# Patient Record
Sex: FEMALE | Race: WHITE | Employment: FULL TIME | URBAN - METROPOLITAN AREA
[De-identification: names, ages, dates, MRNs, and addresses within clinical notes are randomized per-mention and may not be internally consistent; named-entity substitution may affect disease eponyms.]

---

## 2017-01-03 ENCOUNTER — TELEPHONE (OUTPATIENT)
Dept: SURGERY | Facility: CLINIC | Age: 52
End: 2017-01-03

## 2017-01-03 NOTE — TELEPHONE ENCOUNTER
Pt states that she has hives on her elbows. Pt was wondering if lumbar steroid injections could cause this. Pt was instructed to call her PCP. No further questions.

## 2017-01-18 ENCOUNTER — OFFICE VISIT (OUTPATIENT)
Dept: SURGERY | Facility: CLINIC | Age: 52
End: 2017-01-18

## 2017-01-18 VITALS
BODY MASS INDEX: 33.74 KG/M2 | WEIGHT: 215 LBS | SYSTOLIC BLOOD PRESSURE: 128 MMHG | DIASTOLIC BLOOD PRESSURE: 74 MMHG | RESPIRATION RATE: 15 BRPM | HEART RATE: 75 BPM | HEIGHT: 67 IN

## 2017-01-18 DIAGNOSIS — M54.16 LUMBAR RADICULOPATHY: ICD-10-CM

## 2017-01-18 DIAGNOSIS — M46.1 SACROILIITIS (HCC): ICD-10-CM

## 2017-01-18 DIAGNOSIS — M47.816 LUMBAR FACET ARTHROPATHY: Primary | ICD-10-CM

## 2017-01-18 PROCEDURE — 99214 OFFICE O/P EST MOD 30 MIN: CPT | Performed by: ANESTHESIOLOGY

## 2017-01-18 RX ORDER — BETAMETHASONE DIPROPIONATE 0.5 MG/G
CREAM TOPICAL
Refills: 0 | COMMUNITY
Start: 2017-01-11 | End: 2017-02-28

## 2017-01-18 NOTE — PATIENT INSTRUCTIONS
Refill policies:    • Allow 2 business days for refills; controlled substances may take longer.   • Contact your pharmacy at least 5 days prior to running out of medication and have them send an electronic request or submit request through the “request re your physician has recommended that you have a procedure or additional testing performed. [facility]) will contact your insurance carrier to obtain pre-certification or prior authorization.     Unfortunately, RIOS has seen an increas

## 2017-01-22 NOTE — PROGRESS NOTES
Name: Pineda Navarrete   : 1965   DOS: 2017     Pain Clinic Follow Up Visit:   Pineda Navarrete is a 54year old female who presents for recheck of her chronic low back pain.   She is status post lumbar epidural, diagnostic lumbar facet and sacro physician and was recommended to discontinue.     ASSESSMENT AND PLAN:   Lumbar facet arthropathy  (primary encounter diagnosis)  Sacroiliitis (hcc)  Lumbar radiculopathy   Drug reaction  As her pain is completely gone I recommended her to be followed in th

## 2017-02-16 ENCOUNTER — HOSPITAL ENCOUNTER (OUTPATIENT)
Dept: MAMMOGRAPHY | Age: 52
Discharge: HOME | End: 2017-02-16
Attending: FAMILY MEDICINE
Payer: COMMERCIAL

## 2017-02-16 DIAGNOSIS — Z12.31 VISIT FOR SCREENING MAMMOGRAM: ICD-10-CM

## 2017-02-16 PROCEDURE — 77067 SCR MAMMO BI INCL CAD: CPT

## 2017-03-07 ENCOUNTER — TELEPHONE (OUTPATIENT)
Dept: SURGERY | Facility: CLINIC | Age: 52
End: 2017-03-07

## 2017-03-07 NOTE — TELEPHONE ENCOUNTER
Pt having difficulty scheduling physical therapy. Is being told no referral on her account. Told her I would look into it and call her back.

## 2017-03-07 NOTE — TELEPHONE ENCOUNTER
Referral corrected be internal physical therapy. Called [facility] and they authorized 8 visits. LM for pt to call Central Scheduling, [phone] and choose option 1, option 1.   Explained she needs to have therapy at an [place] [address] and to [facility] if she has any

## 2017-03-17 ENCOUNTER — TELEPHONE (OUTPATIENT)
Dept: SURGERY | Facility: CLINIC | Age: 52
End: 2017-03-17

## 2017-03-17 DIAGNOSIS — M47.816 LUMBAR FACET ARTHROPATHY: ICD-10-CM

## 2017-03-17 DIAGNOSIS — E34.52: Primary | ICD-10-CM

## 2017-03-17 DIAGNOSIS — M46.1 SACROILIITIS (HCC): ICD-10-CM

## 2017-03-17 DIAGNOSIS — M54.16 LUMBAR RADICULOPATHY: ICD-10-CM

## 2017-03-17 NOTE — TELEPHONE ENCOUNTER
Spoke with Jolie Ac from PT, new order is needed for pt to proceed with PT. Ok per MADYSON Kang for new order. New Order placed.

## 2017-04-04 ENCOUNTER — OFFICE VISIT (OUTPATIENT)
Dept: PHYSICAL THERAPY | Age: 52
End: 2017-04-04
Attending: NURSE PRACTITIONER
Payer: COMMERCIAL

## 2017-04-04 DIAGNOSIS — M47.816 LUMBAR FACET ARTHROPATHY: Primary | ICD-10-CM

## 2017-04-04 DIAGNOSIS — M54.16 LUMBAR RADICULOPATHY: ICD-10-CM

## 2017-04-04 DIAGNOSIS — M46.1 SACROILIITIS (HCC): ICD-10-CM

## 2017-04-04 PROCEDURE — 97112 NEUROMUSCULAR REEDUCATION: CPT

## 2017-04-04 PROCEDURE — 97162 PT EVAL MOD COMPLEX 30 MIN: CPT

## 2017-04-06 ENCOUNTER — OFFICE VISIT (OUTPATIENT)
Dept: PHYSICAL THERAPY | Age: 52
End: 2017-04-06
Attending: NURSE PRACTITIONER
Payer: COMMERCIAL

## 2017-04-06 PROCEDURE — 97112 NEUROMUSCULAR REEDUCATION: CPT

## 2017-04-06 NOTE — PROGRESS NOTES
Dx: Hyperlordotic Posture Syndrome        Authorized # of Visits:  8        Next MD visit: none scheduled  Fall Risk: standard         Precautions: L5/S1 fusion with partial S1 sacralization.              Subjective: Reports she is performing her HEP as ask min  Total Treatment Time: 45 min

## 2017-04-08 NOTE — PROGRESS NOTES
SPINE EVALUATION:   Referring Physician: Dr. Estephanie Son  Diagnosis: Lumbar hyperlordotic posture syndrome    Date of Service: 4/8/2017     PATIENT SUMMARY   Leslie Connolly is a 54year old y/o female who presents to therapy today with complaints of interm w/o guarding. No lumbar shift.scolosis. Hyperlordotic with anterior pelvic tilt.    Neuro Screen: negative     Lumbar AROM:   Flexion: minimal loss  Extension: minimal loss general LBP  Sidebending: R min loss; L min loss  Rotation: R WNL; L WNL    [employer] Patient will be seen for 1-2 x/week or a total of 10 visits over a 90 day period. Treatment will include: Manual Therapy; Therapeutic Exercises; Neuromuscular Re-education; Therapeutic Activity; Pt education; Home exercise program instruction.     Educatio

## 2017-04-11 ENCOUNTER — OFFICE VISIT (OUTPATIENT)
Dept: PHYSICAL THERAPY | Age: 52
End: 2017-04-11
Attending: NURSE PRACTITIONER
Payer: COMMERCIAL

## 2017-04-11 PROCEDURE — 97112 NEUROMUSCULAR REEDUCATION: CPT

## 2017-04-11 PROCEDURE — 97140 MANUAL THERAPY 1/> REGIONS: CPT

## 2017-04-11 NOTE — PROGRESS NOTES
Dx: Hyperlordotic Posture Syndrome        Authorized # of Visits:  8        Next MD visit: none scheduled  Fall Risk: standard         Precautions: L5/S1 fusion with partial S1 sacralization. Subjective: No hamstring cramping since last seen. or AROM. Causing compression lumbar segments, referring pain. Resolved with sustained flexion motion. Progressed HEP and tx to further progress gapping of segments, decreasing excessive lordosis and ant pelvic tilt.   Pt again educated on avoiding that pos

## 2017-04-13 ENCOUNTER — APPOINTMENT (OUTPATIENT)
Dept: PHYSICAL THERAPY | Age: 52
End: 2017-04-13
Attending: NURSE PRACTITIONER
Payer: COMMERCIAL

## 2017-04-18 ENCOUNTER — OFFICE VISIT (OUTPATIENT)
Dept: PHYSICAL THERAPY | Age: 52
End: 2017-04-18
Attending: NURSE PRACTITIONER
Payer: COMMERCIAL

## 2017-04-18 PROCEDURE — 97140 MANUAL THERAPY 1/> REGIONS: CPT

## 2017-04-18 PROCEDURE — 97110 THERAPEUTIC EXERCISES: CPT

## 2017-04-18 NOTE — PROGRESS NOTES
Dx: Hyperlordotic Posture Syndrome        Authorized # of Visits:  8        Next MD visit: none scheduled  Fall Risk: standard         Precautions: L5/S1 fusion with partial S1 sacralization. Subjective: Pt comes to therapy upset, in distress. negative       Slump and SLR tests negative NM re ed :Hook lying, leg off table hip flex stretch x 60 sec each        Prone hamstring curls, d/c L cramping.  Ab brace in hook lying with draw in maneuver, x 10 seconds x 10 reps        HEP:  Sit: L knee curls

## 2017-05-11 ENCOUNTER — OFFICE VISIT (OUTPATIENT)
Dept: PHYSICAL THERAPY | Age: 52
End: 2017-05-11
Attending: FAMILY MEDICINE
Payer: COMMERCIAL

## 2017-05-11 PROCEDURE — 97140 MANUAL THERAPY 1/> REGIONS: CPT

## 2017-05-11 PROCEDURE — 97112 NEUROMUSCULAR REEDUCATION: CPT

## 2017-05-11 PROCEDURE — 97110 THERAPEUTIC EXERCISES: CPT

## 2017-05-11 NOTE — PROGRESS NOTES
Dx: Hyperlordotic Posture Syndrome        Authorized # of Visits:  8        Next MD visit: none scheduled  Fall Risk: standard         Precautions: L5/S1 fusion with partial S1 sacralization.              Subjective:  Pt reports she has missed therapy due t 4  10 min      B bridge with ab brace x5 2 sets On R side:  Up/mid/low  To L5  lumbar distraction, gapping grade 4  10 min ULTT #1 and #2 negative    Dejuan fx test negative    VB test negative Prone:   Alternate hip ext x 10 each 2 sets      Slump and S -Independent with progressive HEP. Plan: Resume tx to decrease hyperlordosis and ant pelvic tilt. Charges:    There ex 1 nm re ed 2  man 1    Total Timed Treatment:  45 min  Total Treatment Time: 45 min    `

## 2017-05-16 ENCOUNTER — OFFICE VISIT (OUTPATIENT)
Dept: PHYSICAL THERAPY | Age: 52
End: 2017-05-16
Attending: FAMILY MEDICINE
Payer: COMMERCIAL

## 2017-05-16 PROCEDURE — 97112 NEUROMUSCULAR REEDUCATION: CPT

## 2017-05-16 PROCEDURE — 97110 THERAPEUTIC EXERCISES: CPT

## 2017-05-16 PROCEDURE — 97140 MANUAL THERAPY 1/> REGIONS: CPT

## 2017-05-16 NOTE — PROGRESS NOTES
Dx: Hyperlordotic Posture Syndrome        Authorized # of Visits:  8        Next MD visit: none scheduled  Fall Risk: standard         Precautions: L5/S1 fusion with partial S1 sacralization.              Subjective:  L anterior hip has decreased since last faults. On R side:  Up/mid/low  To L5  lumbar distraction, gapping grade 4  10 min NM re ed:   L LE neural mobes with SLR,DF and eversion  5 min     B bridge with ab brace x5 2 sets On R side:  Up/mid/low  To L5  lumbar distraction, gapping grade 4  10 min body mechanics/posture awareness so pt can self correct while performing house chores in 4 pt kneeling with minimal back pain, little difficulty.    -Improve limbo pelvic strength and flexibility  to WFL's so pt can perform bed, chair, car transfers with li

## 2017-05-23 ENCOUNTER — OFFICE VISIT (OUTPATIENT)
Dept: PHYSICAL THERAPY | Age: 52
End: 2017-05-23
Attending: FAMILY MEDICINE
Payer: COMMERCIAL

## 2017-05-23 PROCEDURE — 97110 THERAPEUTIC EXERCISES: CPT

## 2017-05-23 PROCEDURE — 97112 NEUROMUSCULAR REEDUCATION: CPT

## 2017-05-23 PROCEDURE — 97140 MANUAL THERAPY 1/> REGIONS: CPT

## 2017-05-23 NOTE — PROGRESS NOTES
Dx: Hyperlordotic Posture Syndrome        Authorized # of Visits:  8        Next MD visit: none scheduled  Fall Risk: standard         Precautions: L5/S1 fusion with partial S1 sacralization.            Physical Therapy Discharge Report  Subjective: Continu so pt can self correct while performing house chores in 4 pt kneeling with minimal back pain, little difficulty.    -Improve limbo pelvic strength and flexibility  to WFL's so pt can perform bed, chair, car transfers with little pain or difficulty.   -Indep lumbar   8 min    As above with alternate bent knee raise x 10 each  2 sets  (add HEP) Hook lying:  :  -Lumbar mobes A/P grade 2,3 with hip flex  PROM, L/R  10 min Supine:  -PROM rot and side bend, full WNL's    Cervical side glides, no faults.  On R side:

## 2017-05-25 ENCOUNTER — APPOINTMENT (OUTPATIENT)
Dept: PHYSICAL THERAPY | Age: 52
End: 2017-05-25
Attending: FAMILY MEDICINE
Payer: COMMERCIAL

## 2017-06-01 ENCOUNTER — APPOINTMENT (OUTPATIENT)
Dept: PHYSICAL THERAPY | Age: 52
End: 2017-06-01
Attending: FAMILY MEDICINE
Payer: COMMERCIAL

## 2017-06-01 ENCOUNTER — TELEPHONE (OUTPATIENT)
Dept: SURGERY | Facility: CLINIC | Age: 52
End: 2017-06-01

## 2017-06-01 RX ORDER — METHOCARBAMOL 500 MG/1
500 TABLET, FILM COATED ORAL 3 TIMES DAILY PRN
Qty: 90 TABLET | Refills: 2 | Status: SHIPPED | OUTPATIENT
Start: 2017-06-01 | End: 2017-08-14

## 2017-06-01 NOTE — TELEPHONE ENCOUNTER
Pt last saw Dr Yane Contreras 1/2017,pt has appt scheduled with Nino Cai and declined to schedule appt with Dr Yane Contreras at this time but is requesting muscle relaxer for pain-please call pt at [phone]

## 2017-06-01 NOTE — TELEPHONE ENCOUNTER
Pt called in tears requesting muscle relaxant. Pain from 11/2016 has returned and pt having additional symptoms of cramping and burning to toes and top of LT foot. Informed EDUAR Holden. Pt last seen in office 01/2017. Confirmed pharmacy.   Informed pt to

## 2017-06-02 ENCOUNTER — TELEPHONE (OUTPATIENT)
Dept: SURGERY | Facility: CLINIC | Age: 52
End: 2017-06-02

## 2017-06-02 ENCOUNTER — OFFICE VISIT (OUTPATIENT)
Dept: SURGERY | Facility: CLINIC | Age: 52
End: 2017-06-02

## 2017-06-02 VITALS — HEART RATE: 64 BPM | RESPIRATION RATE: 17 BRPM | SYSTOLIC BLOOD PRESSURE: 120 MMHG | DIASTOLIC BLOOD PRESSURE: 80 MMHG

## 2017-06-02 DIAGNOSIS — M54.12 CERVICAL RADICULOPATHY AT C7: ICD-10-CM

## 2017-06-02 DIAGNOSIS — M54.16 LUMBAR RADICULOPATHY, RIGHT: ICD-10-CM

## 2017-06-02 DIAGNOSIS — G89.29 CHRONIC BILATERAL LOW BACK PAIN WITH LEFT-SIDED SCIATICA: ICD-10-CM

## 2017-06-02 DIAGNOSIS — M54.16 LUMBAR RADICULOPATHY: ICD-10-CM

## 2017-06-02 DIAGNOSIS — M47.816 LUMBAR FACET ARTHROPATHY: Primary | ICD-10-CM

## 2017-06-02 DIAGNOSIS — M54.42 CHRONIC BILATERAL LOW BACK PAIN WITH LEFT-SIDED SCIATICA: ICD-10-CM

## 2017-06-02 PROCEDURE — 99213 OFFICE O/P EST LOW 20 MIN: CPT | Performed by: PHYSICIAN ASSISTANT

## 2017-06-02 RX ORDER — METHYLPREDNISOLONE 4 MG/1
TABLET ORAL
Qty: 1 PACKAGE | Refills: 0 | Status: SHIPPED | OUTPATIENT
Start: 2017-06-02 | End: 2017-06-09 | Stop reason: ALTCHOICE

## 2017-06-02 NOTE — PROGRESS NOTES
Neurosurgery Lumbar Consultation      HISTORY OF PRESENT Nick Dubois is a 54year old female here in follow-up for increasing pain.     Since her last visit she did have an MRI of the lumbar spine which showed surgical changes at L5-S1 with spon In 2014 she had a lumbar myelogram and post milligrams CT.  She saw Dr. Shanique Roy who recommended physical therapy.  She did not do PT.      Over the last 2 years her pain is gotten worse.  She states her back pains and 8-9/10 it radiates into her left but NEUROLOGICAL:  This patient is alert and orientated x 3.  Speech fluent. Comprehension intact. Face is symmetrical.    SPINE: Moderate back pain on flexion, extension. Neck pain on flexion-extension.   Sensation to light touch is intact bilateral in both l

## 2017-06-02 NOTE — PROGRESS NOTES
Pt is here to review imaging and follow up regarding lower back pain radiating into left leg, pain currently \"12/10\"

## 2017-06-02 NOTE — TELEPHONE ENCOUNTER
Patient calling, states both MRs cannot be done for a couple of weeks according to central scheduling. She can split up the MRIs and get one done next week and then the other one in 2 weeks.   Asking for order to be changed to STAT so that she can get the

## 2017-06-02 NOTE — PATIENT INSTRUCTIONS
Refill policies:    • Allow 2-3 business days for refills; controlled substances may take longer.   • Contact your pharmacy at least 5 days prior to running out of medication and have them send an electronic request or submit request through the [facility] have a procedure or additional testing performed. [facility]) will contact your insurance carrier to obtain pre-certification or prior authorization.     Unfortunately, [facility]OS has seen an increase in denial of payment even though the p

## 2017-06-06 ENCOUNTER — APPOINTMENT (OUTPATIENT)
Dept: PHYSICAL THERAPY | Age: 52
End: 2017-06-06
Attending: FAMILY MEDICINE
Payer: COMMERCIAL

## 2017-06-07 ENCOUNTER — HOSPITAL ENCOUNTER (OUTPATIENT)
Dept: MRI IMAGING | Age: 52
Discharge: HOME | End: 2017-06-07
Attending: PHYSICIAN ASSISTANT
Payer: COMMERCIAL

## 2017-06-07 DIAGNOSIS — M54.42 CHRONIC BILATERAL LOW BACK PAIN WITH LEFT-SIDED SCIATICA: ICD-10-CM

## 2017-06-07 DIAGNOSIS — M54.16 LUMBAR RADICULOPATHY, RIGHT: ICD-10-CM

## 2017-06-07 DIAGNOSIS — M54.12 CERVICAL RADICULOPATHY AT C7: ICD-10-CM

## 2017-06-07 DIAGNOSIS — M54.16 LUMBAR RADICULOPATHY: ICD-10-CM

## 2017-06-07 DIAGNOSIS — G89.29 CHRONIC BILATERAL LOW BACK PAIN WITH LEFT-SIDED SCIATICA: ICD-10-CM

## 2017-06-07 DIAGNOSIS — M47.816 LUMBAR FACET ARTHROPATHY: ICD-10-CM

## 2017-06-07 PROCEDURE — 72141 MRI NECK SPINE W/O DYE: CPT | Performed by: PHYSICIAN ASSISTANT

## 2017-06-07 PROCEDURE — A9575 INJ GADOTERATE MEGLUMI 0.1ML: HCPCS | Performed by: PHYSICIAN ASSISTANT

## 2017-06-07 PROCEDURE — 72158 MRI LUMBAR SPINE W/O & W/DYE: CPT | Performed by: PHYSICIAN ASSISTANT

## 2017-06-09 ENCOUNTER — OFFICE VISIT (OUTPATIENT)
Dept: SURGERY | Facility: CLINIC | Age: 52
End: 2017-06-09

## 2017-06-09 VITALS
HEART RATE: 80 BPM | DIASTOLIC BLOOD PRESSURE: 72 MMHG | WEIGHT: 225 LBS | HEIGHT: 67 IN | RESPIRATION RATE: 18 BRPM | BODY MASS INDEX: 35.31 KG/M2 | SYSTOLIC BLOOD PRESSURE: 104 MMHG

## 2017-06-09 DIAGNOSIS — M47.816 LUMBAR FACET ARTHROPATHY: Primary | ICD-10-CM

## 2017-06-09 DIAGNOSIS — M50.20 HERNIATION OF CERVICAL INTERVERTEBRAL DISC: ICD-10-CM

## 2017-06-09 DIAGNOSIS — M54.16 LUMBAR RADICULOPATHY: ICD-10-CM

## 2017-06-09 DIAGNOSIS — M54.12 CERVICAL RADICULOPATHY AT C7: ICD-10-CM

## 2017-06-09 DIAGNOSIS — M48.02 CERVICAL STENOSIS OF SPINAL CANAL: ICD-10-CM

## 2017-06-09 PROCEDURE — 99214 OFFICE O/P EST MOD 30 MIN: CPT | Performed by: PHYSICIAN ASSISTANT

## 2017-06-09 RX ORDER — TRIAMTERENE AND HYDROCHLOROTHIAZIDE 37.5; 25 MG/1; MG/1
1 CAPSULE ORAL EVERY MORNING
Status: ON HOLD | COMMUNITY
End: 2018-05-07

## 2017-06-09 RX ORDER — HYDROCODONE BITARTRATE AND ACETAMINOPHEN 10; 325 MG/1; MG/1
1 TABLET ORAL EVERY 4 HOURS PRN
Qty: 90 TABLET | Refills: 0 | Status: ON HOLD | OUTPATIENT
Start: 2017-06-09 | End: 2017-07-12

## 2017-06-09 RX ORDER — ATORVASTATIN CALCIUM 10 MG/1
10 TABLET, FILM COATED ORAL DAILY
COMMUNITY

## 2017-06-09 RX ORDER — METHYLPREDNISOLONE 4 MG/1
TABLET ORAL
Qty: 1 PACKAGE | Refills: 0 | Status: SHIPPED | OUTPATIENT
Start: 2017-06-09 | End: 2017-06-22 | Stop reason: ALTCHOICE

## 2017-06-09 NOTE — PATIENT INSTRUCTIONS
Refill policies:    • Allow 2-3 business days for refills; controlled substances may take longer.   • Contact your pharmacy at least 5 days prior to running out of medication and have them send an electronic request or submit request through the [facility] have a procedure or additional testing performed. [facility]) will contact your insurance carrier to obtain pre-certification or prior authorization.     Unfortunately, RIOS has seen an increase in denial of payment even though the p

## 2017-06-09 NOTE — PROGRESS NOTES
Neurosurgery Lumbar Consultation      HISTORY OF PRESENT Pascual Russo is a 54year old female here in follow-up. She states that the cervical pain started around [place] weekend.   And she has noticed increasing symptoms in the hands and feet wit She has a history of having a L5-S1 fusion in 2006 by Dr. Ady Borrego at [facility] Preoperative she had severe back pain it 8 -10/ 10.  She has severe left L5 and S1 radiculopathy that was an 8–10/10.  She had left foot numbness and weakness.  After surgery and Fexofenadine HCl 180 MG Oral Tab  Take 180 mg by mouth daily.  Disp:   Rfl:     Atorvastatin Calcium (LIPITOR) 10 MG Oral Tab    Disp:   Rfl: 0      No current facility-administered medications on file prior to visit.     REVIEW OF SYSTEMS:  A 10-point syst Lumbar myelogram: L5-S1 fusion.  No neural compromise.  No significant stenosis.  L4-5 bilateral facet arthrosis. MRI lumbar spine from 11/17/2016 shows L5-S1 fusion.   Spondylitic changes at L4-5 mild at L3-4    ASSESSMENT:  1.  L5-S1 fusion with chroni

## 2017-06-21 ENCOUNTER — TELEPHONE (OUTPATIENT)
Dept: SURGERY | Facility: CLINIC | Age: 52
End: 2017-06-21

## 2017-06-21 NOTE — TELEPHONE ENCOUNTER
Patient did take a second Medrol Dosepak while in [place]. She finished on Thursday. She is having increased cervical pain and scapular pain and pain shooting down into her torso and also when she lays down she is having odd sensation in her belly.   Sreekanth Farrell

## 2017-06-21 NOTE — TELEPHONE ENCOUNTER
Spoke with patient who stated pain has been increasing since Monday. Pain is in bilateral shoulders radiating up to her neck by the sides of her ears L>R. Pain is now a constant throbbing sharp pain 7/10.  No c/o numbness/tingling, weakness, or bowel/bladde

## 2017-06-22 ENCOUNTER — TELEPHONE (OUTPATIENT)
Dept: SURGERY | Facility: CLINIC | Age: 52
End: 2017-06-22

## 2017-06-22 ENCOUNTER — OFFICE VISIT (OUTPATIENT)
Dept: SURGERY | Facility: CLINIC | Age: 52
End: 2017-06-22

## 2017-06-22 VITALS
BODY MASS INDEX: 35.31 KG/M2 | DIASTOLIC BLOOD PRESSURE: 80 MMHG | HEIGHT: 67 IN | HEART RATE: 80 BPM | WEIGHT: 225 LBS | SYSTOLIC BLOOD PRESSURE: 120 MMHG | RESPIRATION RATE: 18 BRPM

## 2017-06-22 DIAGNOSIS — M50.20 CERVICAL DISC HERNIATION: Primary | ICD-10-CM

## 2017-06-22 DIAGNOSIS — M50.00 CERVICAL DISC DISEASE WITH MYELOPATHY: Primary | ICD-10-CM

## 2017-06-22 DIAGNOSIS — M96.1 FAILED BACK SYNDROME, LUMBOSACRAL: ICD-10-CM

## 2017-06-22 DIAGNOSIS — M54.2 CERVICALGIA: ICD-10-CM

## 2017-06-22 DIAGNOSIS — G95.9 CERVICAL MYELOPATHY (HCC): ICD-10-CM

## 2017-06-22 PROCEDURE — 99214 OFFICE O/P EST MOD 30 MIN: CPT | Performed by: NEUROLOGICAL SURGERY

## 2017-06-22 NOTE — PROGRESS NOTES
Neurosurgery Clinic Visit  2017    [facility] PCP:  Mary Kate Cosby MD    1965 MRN [id number]       CC: Neck/Shoulder Pain    HPI:    Patient returned from her vacation, did OK on po steroids. She did develop a rash on her chest wall.  she also having cervical pain 5/10 radiating to the left scapula.  She complains of left arm pain going in the C7 dermatome with numbness and tingling.  She complains of left hand weakness.     States she had some recent laboratory studies showing elevated  reports that she has been smoking.  She has never used smokeless tobacco. She reports that she drinks alcohol. She reports that she does not use illicit drugs.     ALLERGIES:    Ceclor                  Hives    MEDICATIONS:    Current Outpatient Prescripti IMAGING:  MRI of the cervical spine from 6/7/17 she was in acute central left-sided C4-5 herniation with cord compression and left exiting root compression.  Diffuse spondylosis at C5-6 with cord compression.  Spondylosis at C6-7.                 MRI of t

## 2017-06-22 NOTE — TELEPHONE ENCOUNTER
**If there is a cancellation 7/10/17 patient to be moved up. You are scheduled for Cervical 4-7 anterior cervical discectomy and fusion on 8/7/17 with .     Pre-op instructions discussed with patient and surgical packet provided:      · You will ne the vendor, [employer] will contact you.  (If appropriate)  · If you were on blood thinners (such as Coumadin, Pradaxa, Xarelto, etc) prior to surgery that we had you stop for surgery, please make sure you get instructions about when to resume the medication

## 2017-06-22 NOTE — PATIENT INSTRUCTIONS
Refill policies:    • Allow 2-3 business days for refills; controlled substances may take longer.   • Contact your pharmacy at least 5 days prior to running out of medication and have them send an electronic request or submit request through the [facility] have a procedure or additional testing performed. [facility]) will contact your insurance carrier to obtain pre-certification or prior authorization.     Unfortunately, [facility]OS has seen an increase in denial of payment even though the p instructed differently by Pre-admissions nurse. · Our office will get authorization for surgery. Surgery is usually scheduled as 23 hour admission. · The hospital will contact you 1-2 days before surgery with your arrival time.    · PCP clearance may be

## 2017-06-26 NOTE — TELEPHONE ENCOUNTER
Patient calling with questions about labs, states PCP office told her we should be ordering. Instructed patient to contact pre-admissions to get scheduled for preop  Testing. Reviewed need for cervical collar and bone growth stimulator.  Orders placed for b

## 2017-06-27 NOTE — TELEPHONE ENCOUNTER
Spoke with patient who confirmed new sx date of 7/10/17. Patient stated  [facility] stated they did not receive collar order. Informed her we would fax it again. Call was then tx to the  to r/s patient's post op appointment.  Patient under

## 2017-06-27 NOTE — TELEPHONE ENCOUNTER
Referrals placed with [facility] for bone growth stimulator and for aspen cervical collar. Authorizations pending.

## 2017-06-27 NOTE — TELEPHONE ENCOUNTER
Referral started with [facility]. Referral # [id number]. [place] pending see referral pool.

## 2017-06-28 ENCOUNTER — TELEPHONE (OUTPATIENT)
Dept: SURGERY | Facility: CLINIC | Age: 52
End: 2017-06-28

## 2017-06-28 NOTE — TELEPHONE ENCOUNTER
Spoke with patient who stated  [facility] informed her that they need a referral order. Patient has [facility]. Referral was placed yesterday. Referral #: [id number]. Spoke with [facility] and informed them that the vendor is  [facility].  [facility] stated they w

## 2017-06-28 NOTE — TELEPHONE ENCOUNTER
Spoke with  [facility] (phone#: [phone]) and informed them of authorized referral for cervical collar. No further questions at this time.

## 2017-06-28 NOTE — TELEPHONE ENCOUNTER
Referrals for surgery, cervical collar and bone growth stimulator have all been authorized. PCP clearance pending.

## 2017-06-29 ENCOUNTER — LABORATORY ENCOUNTER (OUTPATIENT)
Dept: LAB | Facility: HOSPITAL | Age: 52
End: 2017-06-29
Attending: NEUROLOGICAL SURGERY
Payer: COMMERCIAL

## 2017-06-29 DIAGNOSIS — Z01.818 PREOP TESTING: ICD-10-CM

## 2017-06-29 LAB
APTT PPP: 30.5 SECONDS (ref 25–34)
BASOPHILS # BLD AUTO: 0.07 X10(3) UL (ref 0–0.1)
BASOPHILS NFR BLD AUTO: 1 %
BUN BLD-MCNC: 14 MG/DL (ref 8–20)
CALCIUM BLD-MCNC: 9.3 MG/DL (ref 8.3–10.3)
CHLORIDE: 107 MMOL/L (ref 101–111)
CO2: 28 MMOL/L (ref 22–32)
CREAT BLD-MCNC: 0.89 MG/DL (ref 0.55–1.02)
EOSINOPHIL # BLD AUTO: 0.18 X10(3) UL (ref 0–0.3)
EOSINOPHIL NFR BLD AUTO: 2.6 %
ERYTHROCYTE [DISTWIDTH] IN BLOOD BY AUTOMATED COUNT: 13.4 % (ref 11.5–16)
GLUCOSE BLD-MCNC: 100 MG/DL (ref 70–99)
HCT VFR BLD AUTO: 41.5 % (ref 34–50)
HGB BLD-MCNC: 14.2 G/DL (ref 12–16)
IMMATURE GRANULOCYTE COUNT: 0.03 X10(3) UL (ref 0–1)
IMMATURE GRANULOCYTE RATIO %: 0.4 %
INR BLD: 0.89 (ref 0.89–1.11)
LYMPHOCYTES # BLD AUTO: 2.15 X10(3) UL (ref 0.9–4)
LYMPHOCYTES NFR BLD AUTO: 31.6 %
MCH RBC QN AUTO: 30.7 PG (ref 27–33.2)
MCHC RBC AUTO-ENTMCNC: 34.2 G/DL (ref 31–37)
MCV RBC AUTO: 89.8 FL (ref 81–100)
MONOCYTES # BLD AUTO: 0.61 X10(3) UL (ref 0.1–0.6)
MONOCYTES NFR BLD AUTO: 9 %
NEUTROPHIL ABS PRELIM: 3.77 X10 (3) UL (ref 1.3–6.7)
NEUTROPHILS # BLD AUTO: 3.77 X10(3) UL (ref 1.3–6.7)
NEUTROPHILS NFR BLD AUTO: 55.4 %
PLATELET # BLD AUTO: 236 10(3)UL (ref 150–450)
POTASSIUM SERPL-SCNC: 4.2 MMOL/L (ref 3.6–5.1)
PSA SERPL DL<=0.01 NG/ML-MCNC: 12 SECONDS (ref 12–14.3)
RBC # BLD AUTO: 4.62 X10(6)UL (ref 3.8–5.1)
RED CELL DISTRIBUTION WIDTH-SD: 43.9 FL (ref 35.1–46.3)
SODIUM SERPL-SCNC: 141 MMOL/L (ref 136–144)
WBC # BLD AUTO: 6.8 X10(3) UL (ref 4–13)

## 2017-06-29 PROCEDURE — 85610 PROTHROMBIN TIME: CPT

## 2017-06-29 PROCEDURE — 80048 BASIC METABOLIC PNL TOTAL CA: CPT

## 2017-06-29 PROCEDURE — 85025 COMPLETE CBC W/AUTO DIFF WBC: CPT

## 2017-06-29 PROCEDURE — 87081 CULTURE SCREEN ONLY: CPT

## 2017-06-29 PROCEDURE — 36415 COLL VENOUS BLD VENIPUNCTURE: CPT

## 2017-06-29 PROCEDURE — 93010 ELECTROCARDIOGRAM REPORT: CPT | Performed by: INTERNAL MEDICINE

## 2017-06-29 PROCEDURE — 85730 THROMBOPLASTIN TIME PARTIAL: CPT

## 2017-06-29 PROCEDURE — 93005 ELECTROCARDIOGRAM TRACING: CPT

## 2017-06-29 NOTE — TELEPHONE ENCOUNTER
Spoke with [address] [place] [address] again today and gave them referral authorization #: [id number]. No further questions at this time.

## 2017-06-29 NOTE — TELEPHONE ENCOUNTER
Spoke with patient who agreed to move surgery to 7/11/17. Patient also is going to her PCP next week for clearance.

## 2017-06-30 ENCOUNTER — TELEPHONE (OUTPATIENT)
Dept: SURGERY | Facility: CLINIC | Age: 52
End: 2017-06-30

## 2017-06-30 NOTE — TELEPHONE ENCOUNTER
Pt called back stating she needs new letter with specific date to stop work to be 7/3/2017    New letter generated, signed and faxed .

## 2017-06-30 NOTE — TELEPHONE ENCOUNTER
Calling pt to verify fax #, attempted to send fax 3 times without success. Need to see if there is a different # to send fax to.  Or try again Monday morning      Letter faxed to [phone] Attention [facility], per pt request.  Fax conf received

## 2017-06-30 NOTE — TELEPHONE ENCOUNTER
Paperwork was faxed to [employer] [phone] confirmation receipt of paperwork received. 16 pages faxed.    Sent to scan

## 2017-06-30 NOTE — TELEPHONE ENCOUNTER
Spoke with patient who stated her symptoms are making it harder and harder to work along with the side effects of the medication. Patient is currently at work, but is wanting to have next week off work until surgery 7/11/17.  Informed patient I would d/w Da

## 2017-06-30 NOTE — TELEPHONE ENCOUNTER
Spoke with patient and informed her of message below.  Work note faxed to [phone] per patient request.     [facility] paperwork completed to reflect off work starting next week until re-evaluated at post-op appointment (Off approximately 8-12 weeks.)    [place]

## 2017-07-05 NOTE — TELEPHONE ENCOUNTER
Pt called stated she needed work note faxed to new # [phone], address letter to Attention: Lucy Perez.   Nothing further

## 2017-07-06 NOTE — TELEPHONE ENCOUNTER
Called pt regarding medical clearance, pt states she was seen 7/5/2017.   Will follow up with PCP office    Called PCP office, spoke to Dl who verified pt was in yesterday, fax # was provided, she stated she would have her medical clearance faxed mirian

## 2017-07-06 NOTE — TELEPHONE ENCOUNTER
Called PCP office to find status of medical clearance, office is currently closed will have to call back later

## 2017-07-07 NOTE — TELEPHONE ENCOUNTER
Medical clearance received. Per Dr. Torrez Argue acceptable risk for cervical fusion as planned\"    Faxed to pre-admissions.   Nothing further needed for sx

## 2017-07-07 NOTE — TELEPHONE ENCOUNTER
Called PCP office to get status of medical clearance. Spoke to Sonam who stated she would fax it over immediately.

## 2017-07-11 ENCOUNTER — HOSPITAL ENCOUNTER (INPATIENT)
Facility: HOSPITAL | Age: 52
LOS: 1 days | Discharge: HOME | DRG: 472 | End: 2017-07-12
Attending: NEUROLOGICAL SURGERY | Admitting: NEUROLOGICAL SURGERY
Payer: COMMERCIAL

## 2017-07-11 ENCOUNTER — SURGERY (OUTPATIENT)
Age: 52
End: 2017-07-11

## 2017-07-11 ENCOUNTER — APPOINTMENT (OUTPATIENT)
Dept: GENERAL RADIOLOGY | Facility: HOSPITAL | Age: 52
DRG: 472 | End: 2017-07-11
Attending: NEUROLOGICAL SURGERY
Payer: COMMERCIAL

## 2017-07-11 DIAGNOSIS — M50.00 CERVICAL DISC DISEASE WITH MYELOPATHY: ICD-10-CM

## 2017-07-11 DIAGNOSIS — Z01.818 PREOP TESTING: Primary | ICD-10-CM

## 2017-07-11 LAB
GLUCOSE BLD-MCNC: 95 MG/DL (ref 65–99)
ISTAT BLOOD GAS BASE DEFICIT: -2 MMOL/L
ISTAT BLOOD GAS HCO3: 25.1 MEQ/L (ref 22–26)
ISTAT BLOOD GAS O2 SATURATION: 99 % (ref 92–100)
ISTAT BLOOD GAS PCO2: 54 MMHG (ref 35–45)
ISTAT BLOOD GAS PH: 7.28 (ref 7.35–7.45)
ISTAT BLOOD GAS PO2: 132 MMHG (ref 80–105)
ISTAT BLOOD GAS TCO2: 27 MMOL/L (ref 22–32)
ISTAT HEMATOCRIT: 40 % (ref 37–54)
ISTAT IONIZED CALCIUM: 1.25 MMOL/L (ref 1.12–1.32)
ISTAT POTASSIUM: 4.1 MMOL/L (ref 3.6–5.1)
ISTAT SODIUM: 137 MMOL/L (ref 136–144)
POCT LOT NUMBER: NORMAL
POCT URINE PREGNANCY: NEGATIVE

## 2017-07-11 PROCEDURE — 85014 HEMATOCRIT: CPT

## 2017-07-11 PROCEDURE — 82803 BLOOD GASES ANY COMBINATION: CPT

## 2017-07-11 PROCEDURE — 4A11X4G MONITORING OF PERIPHERAL NERVOUS ELECTRICAL ACTIVITY, INTRAOPERATIVE, EXTERNAL APPROACH: ICD-10-PCS | Performed by: NEUROLOGICAL SURGERY

## 2017-07-11 PROCEDURE — 84295 ASSAY OF SERUM SODIUM: CPT

## 2017-07-11 PROCEDURE — 22552 ARTHRD ANT NTRBD CERVICAL EA: CPT | Performed by: PHYSICIAN ASSISTANT

## 2017-07-11 PROCEDURE — 84132 ASSAY OF SERUM POTASSIUM: CPT

## 2017-07-11 PROCEDURE — 01N10ZZ RELEASE CERVICAL NERVE, OPEN APPROACH: ICD-10-PCS | Performed by: NEUROLOGICAL SURGERY

## 2017-07-11 PROCEDURE — 76001 XR FLUOROSCOPE EXAM >1 HR EXTENSIVE (CPT=76001): CPT | Performed by: NEUROLOGICAL SURGERY

## 2017-07-11 PROCEDURE — 82330 ASSAY OF CALCIUM: CPT

## 2017-07-11 PROCEDURE — 22551 ARTHRD ANT NTRBDY CERVICAL: CPT | Performed by: PHYSICIAN ASSISTANT

## 2017-07-11 PROCEDURE — 0RG20K0 FUSION OF 2 OR MORE CERVICAL VERTEBRAL JOINTS WITH NONAUTOLOGOUS TISSUE SUBSTITUTE, ANTERIOR APPROACH, ANTERIOR COLUMN, OPEN APPROACH: ICD-10-PCS | Performed by: NEUROLOGICAL SURGERY

## 2017-07-11 PROCEDURE — 22846 INSERT SPINE FIXATION DEVICE: CPT | Performed by: PHYSICIAN ASSISTANT

## 2017-07-11 PROCEDURE — 0RB30ZZ EXCISION OF CERVICAL VERTEBRAL DISC, OPEN APPROACH: ICD-10-PCS | Performed by: NEUROLOGICAL SURGERY

## 2017-07-11 DEVICE — SCREW EAGLE+SWIFT PRM ST 16: Type: IMPLANTABLE DEVICE | Site: BACK | Status: FUNCTIONAL

## 2017-07-11 DEVICE — IMPLANTABLE DEVICE: Type: IMPLANTABLE DEVICE | Site: BACK | Status: FUNCTIONAL

## 2017-07-11 DEVICE — BONE CERV 5 LIFENET VG2C-T57: Type: IMPLANTABLE DEVICE | Site: BACK | Status: FUNCTIONAL

## 2017-07-11 DEVICE — EAGLE/SWIFT PLUS ANTERIOR CERVICAL PLATE STRAIGHT - TEMPORARY FIXATION PIN
Type: IMPLANTABLE DEVICE | Site: BACK | Status: FUNCTIONAL
Brand: EAGLE SWIFT

## 2017-07-11 RX ORDER — HYDROCODONE BITARTRATE AND ACETAMINOPHEN 10; 325 MG/1; MG/1
1 TABLET ORAL EVERY 4 HOURS PRN
Status: DISCONTINUED | OUTPATIENT
Start: 2017-07-11 | End: 2017-07-12

## 2017-07-11 RX ORDER — SODIUM CHLORIDE 9 MG/ML
INJECTION, SOLUTION INTRAVENOUS CONTINUOUS
Status: DISCONTINUED | OUTPATIENT
Start: 2017-07-11 | End: 2017-07-12

## 2017-07-11 RX ORDER — SODIUM CHLORIDE, SODIUM LACTATE, POTASSIUM CHLORIDE, CALCIUM CHLORIDE 600; 310; 30; 20 MG/100ML; MG/100ML; MG/100ML; MG/100ML
INJECTION, SOLUTION INTRAVENOUS CONTINUOUS
Status: DISCONTINUED | OUTPATIENT
Start: 2017-07-11 | End: 2017-07-12

## 2017-07-11 RX ORDER — MIDAZOLAM HYDROCHLORIDE 1 MG/ML
INJECTION INTRAMUSCULAR; INTRAVENOUS
Status: COMPLETED
Start: 2017-07-11 | End: 2017-07-11

## 2017-07-11 RX ORDER — HYDROMORPHONE HYDROCHLORIDE 1 MG/ML
0.4 INJECTION, SOLUTION INTRAMUSCULAR; INTRAVENOUS; SUBCUTANEOUS EVERY 5 MIN PRN
Status: DISCONTINUED | OUTPATIENT
Start: 2017-07-11 | End: 2017-07-11 | Stop reason: HOSPADM

## 2017-07-11 RX ORDER — HYDROMORPHONE HYDROCHLORIDE 1 MG/ML
INJECTION, SOLUTION INTRAMUSCULAR; INTRAVENOUS; SUBCUTANEOUS
Status: COMPLETED
Start: 2017-07-11 | End: 2017-07-11

## 2017-07-11 RX ORDER — ONDANSETRON 2 MG/ML
4 INJECTION INTRAMUSCULAR; INTRAVENOUS EVERY 4 HOURS PRN
Status: DISCONTINUED | OUTPATIENT
Start: 2017-07-11 | End: 2017-07-12

## 2017-07-11 RX ORDER — DIPHENHYDRAMINE HCL 25 MG
25 CAPSULE ORAL EVERY 4 HOURS PRN
Status: DISCONTINUED | OUTPATIENT
Start: 2017-07-11 | End: 2017-07-12

## 2017-07-11 RX ORDER — SODIUM CHLORIDE, SODIUM LACTATE, POTASSIUM CHLORIDE, CALCIUM CHLORIDE 600; 310; 30; 20 MG/100ML; MG/100ML; MG/100ML; MG/100ML
INJECTION, SOLUTION INTRAVENOUS CONTINUOUS
Status: DISCONTINUED | OUTPATIENT
Start: 2017-07-11 | End: 2017-07-11

## 2017-07-11 RX ORDER — HYDROMORPHONE HYDROCHLORIDE 1 MG/ML
0.8 INJECTION, SOLUTION INTRAMUSCULAR; INTRAVENOUS; SUBCUTANEOUS EVERY 2 HOUR PRN
Status: DISCONTINUED | OUTPATIENT
Start: 2017-07-11 | End: 2017-07-12

## 2017-07-11 RX ORDER — HYDROCODONE BITARTRATE AND ACETAMINOPHEN 5; 325 MG/1; MG/1
1 TABLET ORAL AS NEEDED
Status: DISCONTINUED | OUTPATIENT
Start: 2017-07-11 | End: 2017-07-11 | Stop reason: HOSPADM

## 2017-07-11 RX ORDER — METOCLOPRAMIDE HYDROCHLORIDE 5 MG/ML
10 INJECTION INTRAMUSCULAR; INTRAVENOUS AS NEEDED
Status: DISCONTINUED | OUTPATIENT
Start: 2017-07-11 | End: 2017-07-11 | Stop reason: HOSPADM

## 2017-07-11 RX ORDER — HYDROCODONE BITARTRATE AND ACETAMINOPHEN 10; 325 MG/1; MG/1
2 TABLET ORAL EVERY 4 HOURS PRN
Status: DISCONTINUED | OUTPATIENT
Start: 2017-07-11 | End: 2017-07-12

## 2017-07-11 RX ORDER — TRIAMTERENE AND HYDROCHLOROTHIAZIDE 37.5; 25 MG/1; MG/1
1 CAPSULE ORAL EVERY MORNING
Status: DISCONTINUED | OUTPATIENT
Start: 2017-07-12 | End: 2017-07-12

## 2017-07-11 RX ORDER — DIPHENHYDRAMINE HYDROCHLORIDE 50 MG/ML
25 INJECTION INTRAMUSCULAR; INTRAVENOUS EVERY 4 HOURS PRN
Status: DISCONTINUED | OUTPATIENT
Start: 2017-07-11 | End: 2017-07-12

## 2017-07-11 RX ORDER — DOCUSATE SODIUM 100 MG/1
100 CAPSULE, LIQUID FILLED ORAL 2 TIMES DAILY
Status: DISCONTINUED | OUTPATIENT
Start: 2017-07-12 | End: 2017-07-12

## 2017-07-11 RX ORDER — HYDROCODONE BITARTRATE AND ACETAMINOPHEN 5; 325 MG/1; MG/1
2 TABLET ORAL AS NEEDED
Status: DISCONTINUED | OUTPATIENT
Start: 2017-07-11 | End: 2017-07-11 | Stop reason: HOSPADM

## 2017-07-11 RX ORDER — POLYETHYLENE GLYCOL 3350 17 G/17G
17 POWDER, FOR SOLUTION ORAL DAILY PRN
Status: DISCONTINUED | OUTPATIENT
Start: 2017-07-11 | End: 2017-07-12

## 2017-07-11 RX ORDER — HYDROMORPHONE HYDROCHLORIDE 1 MG/ML
0.2 INJECTION, SOLUTION INTRAMUSCULAR; INTRAVENOUS; SUBCUTANEOUS EVERY 2 HOUR PRN
Status: DISCONTINUED | OUTPATIENT
Start: 2017-07-11 | End: 2017-07-12

## 2017-07-11 RX ORDER — NALOXONE HYDROCHLORIDE 0.4 MG/ML
80 INJECTION, SOLUTION INTRAMUSCULAR; INTRAVENOUS; SUBCUTANEOUS AS NEEDED
Status: ACTIVE | OUTPATIENT
Start: 2017-07-11 | End: 2017-07-11

## 2017-07-11 RX ORDER — MEPERIDINE HYDROCHLORIDE 25 MG/ML
INJECTION INTRAMUSCULAR; INTRAVENOUS; SUBCUTANEOUS
Status: COMPLETED
Start: 2017-07-11 | End: 2017-07-11

## 2017-07-11 RX ORDER — LABETALOL HYDROCHLORIDE 5 MG/ML
5 INJECTION, SOLUTION INTRAVENOUS EVERY 5 MIN PRN
Status: DISCONTINUED | OUTPATIENT
Start: 2017-07-11 | End: 2017-07-11 | Stop reason: HOSPADM

## 2017-07-11 RX ORDER — NALOXONE HYDROCHLORIDE 0.4 MG/ML
80 INJECTION, SOLUTION INTRAMUSCULAR; INTRAVENOUS; SUBCUTANEOUS AS NEEDED
Status: DISCONTINUED | OUTPATIENT
Start: 2017-07-11 | End: 2017-07-11 | Stop reason: HOSPADM

## 2017-07-11 RX ORDER — DIPHENHYDRAMINE HYDROCHLORIDE 50 MG/ML
12.5 INJECTION INTRAMUSCULAR; INTRAVENOUS AS NEEDED
Status: DISCONTINUED | OUTPATIENT
Start: 2017-07-11 | End: 2017-07-11 | Stop reason: HOSPADM

## 2017-07-11 RX ORDER — SULFAMETHOXAZOLE AND TRIMETHOPRIM 800; 160 MG/1; MG/1
1 TABLET ORAL 2 TIMES DAILY
Status: DISCONTINUED | OUTPATIENT
Start: 2017-07-12 | End: 2017-07-12

## 2017-07-11 RX ORDER — ONDANSETRON 2 MG/ML
4 INJECTION INTRAMUSCULAR; INTRAVENOUS AS NEEDED
Status: ACTIVE | OUTPATIENT
Start: 2017-07-11 | End: 2017-07-11

## 2017-07-11 RX ORDER — BACITRACIN 50000 [USP'U]/1
INJECTION, POWDER, LYOPHILIZED, FOR SOLUTION INTRAMUSCULAR AS NEEDED
Status: DISCONTINUED | OUTPATIENT
Start: 2017-07-11 | End: 2017-07-11 | Stop reason: HOSPADM

## 2017-07-11 RX ORDER — ALBUTEROL SULFATE 2.5 MG/3ML
2.5 SOLUTION RESPIRATORY (INHALATION) AS NEEDED
Status: ACTIVE | OUTPATIENT
Start: 2017-07-11 | End: 2017-07-11

## 2017-07-11 RX ORDER — ALBUTEROL SULFATE 2.5 MG/3ML
2.5 SOLUTION RESPIRATORY (INHALATION) AS NEEDED
Status: DISCONTINUED | OUTPATIENT
Start: 2017-07-11 | End: 2017-07-11 | Stop reason: HOSPADM

## 2017-07-11 RX ORDER — MIDAZOLAM HYDROCHLORIDE 1 MG/ML
1 INJECTION INTRAMUSCULAR; INTRAVENOUS EVERY 5 MIN PRN
Status: DISCONTINUED | OUTPATIENT
Start: 2017-07-11 | End: 2017-07-11 | Stop reason: HOSPADM

## 2017-07-11 RX ORDER — MEPERIDINE HYDROCHLORIDE 25 MG/ML
12.5 INJECTION INTRAMUSCULAR; INTRAVENOUS; SUBCUTANEOUS AS NEEDED
Status: COMPLETED | OUTPATIENT
Start: 2017-07-11 | End: 2017-07-11

## 2017-07-11 RX ORDER — PAROXETINE HYDROCHLORIDE 20 MG/1
20 TABLET, FILM COATED ORAL EVERY MORNING
Status: DISCONTINUED | OUTPATIENT
Start: 2017-07-12 | End: 2017-07-12

## 2017-07-11 RX ORDER — ACETAMINOPHEN 325 MG/1
650 TABLET ORAL EVERY 4 HOURS PRN
Status: DISCONTINUED | OUTPATIENT
Start: 2017-07-11 | End: 2017-07-12

## 2017-07-11 RX ORDER — ONDANSETRON 2 MG/ML
4 INJECTION INTRAMUSCULAR; INTRAVENOUS AS NEEDED
Status: DISCONTINUED | OUTPATIENT
Start: 2017-07-11 | End: 2017-07-11 | Stop reason: HOSPADM

## 2017-07-11 RX ORDER — METHOCARBAMOL 500 MG/1
500 TABLET, FILM COATED ORAL 3 TIMES DAILY PRN
Status: DISCONTINUED | OUTPATIENT
Start: 2017-07-11 | End: 2017-07-12

## 2017-07-11 RX ORDER — CETIRIZINE HYDROCHLORIDE 10 MG/1
10 TABLET ORAL DAILY
Status: DISCONTINUED | OUTPATIENT
Start: 2017-07-12 | End: 2017-07-12

## 2017-07-11 RX ORDER — HYDROMORPHONE HYDROCHLORIDE 1 MG/ML
0.4 INJECTION, SOLUTION INTRAMUSCULAR; INTRAVENOUS; SUBCUTANEOUS EVERY 2 HOUR PRN
Status: DISCONTINUED | OUTPATIENT
Start: 2017-07-11 | End: 2017-07-12

## 2017-07-11 RX ORDER — HYDROMORPHONE HYDROCHLORIDE 1 MG/ML
0.4 INJECTION, SOLUTION INTRAMUSCULAR; INTRAVENOUS; SUBCUTANEOUS EVERY 5 MIN PRN
Status: ACTIVE | OUTPATIENT
Start: 2017-07-11 | End: 2017-07-11

## 2017-07-11 RX ORDER — ATORVASTATIN CALCIUM 10 MG/1
10 TABLET, FILM COATED ORAL DAILY
Status: DISCONTINUED | OUTPATIENT
Start: 2017-07-12 | End: 2017-07-12

## 2017-07-11 NOTE — H&P
Neurosurgery Clinic Visit  2017           Deleta Stager PCP:  Sunday Gonzales MD    1965 MRN [id number]         CC: Neck/Shoulder Pain     HPI:    Patient returned from her vacation, did OK on po steroids.   She did develop a rash on her ch Her back pain is a 10/10.  Radiating down the left leg in the L4-L5 and S1 dermatomes.  Occasionally she gets shooting into the left groin she is having symptoms in the right leg as well with numbness and tingling and cramping in her right foot.  Has any b    EXCIS LUMBAR DISK,ONE LEVEL                    X 1        REMOVAL OF TONSILS,14+ Y/O                 FAMILY HISTORY:  family history includes Cancer in her brother; Diabetes in her maternal grandmother; Hypertension in her mother.     SOCIAL HI Motor is 5/5 in the upper extremities with some weakness in the intrinsics, bilateral finger extension and left  and finger extension 4+ 5-/5.    Lower extremity strength:        Iliopsoas    Hamstrings     Duey Mais D-flexion   P-flexion   Eversion   The risks, indication, options, and benefits of the operation were discussed with patient at length. All questions were answered and the patient wishes to proceed.     Pt seen and examined. No changes since last visit. All questions answered.   Pt ready

## 2017-07-12 ENCOUNTER — APPOINTMENT (OUTPATIENT)
Dept: GENERAL RADIOLOGY | Facility: HOSPITAL | Age: 52
DRG: 472 | End: 2017-07-12
Attending: PHYSICIAN ASSISTANT
Payer: COMMERCIAL

## 2017-07-12 VITALS
OXYGEN SATURATION: 95 % | BODY MASS INDEX: 36.03 KG/M2 | TEMPERATURE: 98 F | WEIGHT: 224.19 LBS | HEIGHT: 66 IN | HEART RATE: 82 BPM | DIASTOLIC BLOOD PRESSURE: 73 MMHG | RESPIRATION RATE: 18 BRPM | SYSTOLIC BLOOD PRESSURE: 119 MMHG

## 2017-07-12 LAB
ERYTHROCYTE [DISTWIDTH] IN BLOOD BY AUTOMATED COUNT: 12.7 % (ref 11.5–16)
ERYTHROCYTE [DISTWIDTH] IN BLOOD BY AUTOMATED COUNT: 12.8 % (ref 11.5–16)
HCT VFR BLD AUTO: 36.7 % (ref 34–50)
HCT VFR BLD AUTO: 38.6 % (ref 34–50)
HGB BLD-MCNC: 12.4 G/DL (ref 12–16)
HGB BLD-MCNC: 13.3 G/DL (ref 12–16)
MCH RBC QN AUTO: 30.8 PG (ref 27–33.2)
MCH RBC QN AUTO: 31 PG (ref 27–33.2)
MCHC RBC AUTO-ENTMCNC: 33.8 G/DL (ref 31–37)
MCHC RBC AUTO-ENTMCNC: 34.5 G/DL (ref 31–37)
MCV RBC AUTO: 90 FL (ref 81–100)
MCV RBC AUTO: 91.3 FL (ref 81–100)
PLATELET # BLD AUTO: 211 10(3)UL (ref 150–450)
PLATELET # BLD AUTO: 229 10(3)UL (ref 150–450)
RBC # BLD AUTO: 4.02 X10(6)UL (ref 3.8–5.1)
RBC # BLD AUTO: 4.29 X10(6)UL (ref 3.8–5.1)
RED CELL DISTRIBUTION WIDTH-SD: 42 FL (ref 35.1–46.3)
RED CELL DISTRIBUTION WIDTH-SD: 42.8 FL (ref 35.1–46.3)
WBC # BLD AUTO: 10.5 X10(3) UL (ref 4–13)
WBC # BLD AUTO: 14.7 X10(3) UL (ref 4–13)

## 2017-07-12 PROCEDURE — 99233 SBSQ HOSP IP/OBS HIGH 50: CPT | Performed by: HOSPITALIST

## 2017-07-12 PROCEDURE — 72040 X-RAY EXAM NECK SPINE 2-3 VW: CPT | Performed by: PHYSICIAN ASSISTANT

## 2017-07-12 RX ORDER — OXYCODONE HYDROCHLORIDE AND ACETAMINOPHEN 5; 325 MG/1; MG/1
2 TABLET ORAL EVERY 4 HOURS PRN
Status: DISCONTINUED | OUTPATIENT
Start: 2017-07-12 | End: 2017-07-12

## 2017-07-12 RX ORDER — OXYCODONE HYDROCHLORIDE AND ACETAMINOPHEN 5; 325 MG/1; MG/1
2 TABLET ORAL EVERY 4 HOURS PRN
Qty: 30 TABLET | Refills: 0 | Status: SHIPPED | OUTPATIENT
Start: 2017-07-12 | End: 2017-08-14 | Stop reason: ALTCHOICE

## 2017-07-12 RX ORDER — NICOTINE 21 MG/24HR
1 PATCH, TRANSDERMAL 24 HOURS TRANSDERMAL DAILY
Status: DISCONTINUED | OUTPATIENT
Start: 2017-07-12 | End: 2017-07-12

## 2017-07-12 RX ORDER — OXYCODONE HYDROCHLORIDE AND ACETAMINOPHEN 5; 325 MG/1; MG/1
1 TABLET ORAL EVERY 4 HOURS PRN
Status: DISCONTINUED | OUTPATIENT
Start: 2017-07-12 | End: 2017-07-12

## 2017-07-12 NOTE — PHYSICAL THERAPY NOTE
PHYSICAL THERAPY QUICK EVALUATION - INPATIENT    Room Number: 368/368-A  Evaluation Date: 7/12/2017  Presenting Problem: S/p C4-C5,C5-C6,C6-C7 ACDF on 07/11/17  Physician Order: PT Eval and Treat    Problem List  Active Problems:    Preop testing    Displa functional limits     Lower extremity ROM is within functional limits     Lower extremity strength is within functional limits     NEUROLOGICAL FINDINGS  Neurological Findings: None                   AM-PAC '6-Clicks' INPATIENT SHORT FORM - BASIC MOBILITY /    ASSESSMENT   Patient is 50years old female S/p C4-C5,C5-C6,C6-C7 ACDF on 07/11/17,demonstrated good understanding re: post surgical precautions & proper body mechanics during independent bed mobility, functional transfers & g

## 2017-07-12 NOTE — PLAN OF CARE
Pt pain is much more controlled now. Will inform Jose Martin Mata that the pt is ready to d/c today.

## 2017-07-12 NOTE — PROGRESS NOTES
[facility] Neurosurgery Progress Note    Pretty Merida Patient Status:  Inpatient    1965 MRN [id number]   [place] 3SW-A Attending Enrique Lamb MD   Hosp Day # 1 PCP German Leo MD     Subjective:  Tyesha Ulloa home    She would like to see how she feels this afternoon and possibly go home later.         MADYSON Almeida  [facility] [id number]  Pager [phone]  7/12/2017, 9:24 AM

## 2017-07-12 NOTE — RESPIRATORY THERAPY NOTE
JIMMY - Equipment Use Daily Summary:                  . Set Mode: AUTO CPAP WITH C-FLEX                . Usage in hours: 3:09                . 90% Pressure (EPAP) level: 15.8                . 90% Insp. Pressure (IPAP): Mary Spar  AHI: 6.1

## 2017-07-12 NOTE — OPERATIVE REPORT
[facility]    OPERATIVE REPORT    Patient:  Lyla Torres;  YOB: 1965     CSN:  [id number]; Medical Record Number:  [id number]    Admission Date:  7/11/2017 Operation Date:  7/11/2017    . ..........................     Operating Physici adequate endotracheal anesthesia in the supine position on the operating room table the patient had sequential compression boots placed, Constantino catheter placed, neurophysiological monitoring instituted, and a small towel roll placed under the cervical spino endplate at its attachment to the vertebral bodies down to mid vertebral body level. Loose disc material was curetted out and removed with pituitary rongeurs to visualize the trajectory of the disc space.  With both visual and fluoroscopic imaging of the [facility] rongeur to allow the nerve root dura to expand further into the foramen. The dura of the central canal and exiting nerve roots was nicely pulsatile upon completion of the neural decompression.   Pathology as described above and findings with severe shan Once an acceptable position on imaging and by visual inspection was accomplished the closure was initiated. The closure included generous irrigation down the lateral aspects of the bone grafts to insure no bleeding from the epidural space.  Retractors we

## 2017-07-12 NOTE — CONSULTS
[facility]  [address] [facility] Patient Status:  Inpatient    1965 MRN [id number]   [facility] 3SW-A Attending Milka Oneill MD   Hosp Day # 1 PCP Kirill Morales MD     Reason for consult: Medical management every morning. Disp:  Rfl:    atorvastatin 10 MG Oral Tab Take 10 mg by mouth daily. Disp:  Rfl:    HYDROcodone-acetaminophen  MG Oral Tab Take 1 tablet by mouth every 4 (four) hours as needed.  Disp: 90 tablet Rfl: 0   methocarbamol 500 MG Oral Tab T hours. No results for input(s): TROP, CK in the last 72 hours. Imaging: Imaging data reviewed in Epic.       ASSESSMENT / PLAN:     3 50years old woman with chronic low back pain status post L5-S1 fusion in the past presented with acute left large h

## 2017-07-12 NOTE — PROGRESS NOTES
Seen and examined    Will try percocet instead of norco for pain control  Reiterated first line is tylenol and then percocet for break through  No more than 4000mg of acetaminophen total over 24 hrs    Safe for dc if cleared later today otherwise tmw    -M

## 2017-07-12 NOTE — PLAN OF CARE
Assisted up to the chair with use of walker . Tolerated good . Breakfast ordered , no difficulty in swallowing noted .

## 2017-07-19 RX ORDER — OXYCODONE HYDROCHLORIDE AND ACETAMINOPHEN 5; 325 MG/1; MG/1
2 TABLET ORAL EVERY 4 HOURS PRN
Qty: 30 TABLET | Refills: 0 | Status: CANCELLED | OUTPATIENT
Start: 2017-07-19

## 2017-07-19 NOTE — TELEPHONE ENCOUNTER
Patient had sx 7/11/17 with Radha Cook for: \"CERVICAL 4 - CERVICAL 5, CERVICAL 5 - CERVICAL 6, CERVICAL 6 - CERVICAL 7 ANTERIOR DISCECTOMY AND FUSION WITH INSTRUMENTATION AND MICROSCOPE DISSECTION\"    Spoke with patient who stated she finished her prescrip

## 2017-07-19 NOTE — TELEPHONE ENCOUNTER
Patient was sent home after having a C4-7 ACDF 7/11/2017. She was given Percocet 5/325 she was taken to a time. It was helping her surgical pain. She has Norco 10/3 2 5 at home enough to get her through to her next appointment.   She like to switch ove

## 2017-07-19 NOTE — TELEPHONE ENCOUNTER
2nd call. 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

## 2017-07-27 ENCOUNTER — OFFICE VISIT (OUTPATIENT)
Dept: SURGERY | Facility: CLINIC | Age: 52
End: 2017-07-27

## 2017-07-27 VITALS
SYSTOLIC BLOOD PRESSURE: 112 MMHG | WEIGHT: 225 LBS | DIASTOLIC BLOOD PRESSURE: 80 MMHG | HEART RATE: 62 BPM | HEIGHT: 66 IN | BODY MASS INDEX: 36.16 KG/M2

## 2017-07-27 DIAGNOSIS — Z98.1 S/P CERVICAL SPINAL FUSION: ICD-10-CM

## 2017-07-27 DIAGNOSIS — M50.20 CERVICAL DISC HERNIATION: ICD-10-CM

## 2017-07-27 DIAGNOSIS — M50.00 CERVICAL DISC DISEASE WITH MYELOPATHY: Primary | ICD-10-CM

## 2017-07-27 PROCEDURE — 99024 POSTOP FOLLOW-UP VISIT: CPT | Performed by: PHYSICIAN ASSISTANT

## 2017-07-27 RX ORDER — METHYLPREDNISOLONE 4 MG/1
TABLET ORAL
Qty: 1 PACKAGE | Refills: 0 | Status: SHIPPED | OUTPATIENT
Start: 2017-07-27 | End: 2017-08-14 | Stop reason: ALTCHOICE

## 2017-07-27 RX ORDER — HYDROCODONE BITARTRATE AND ACETAMINOPHEN 10; 325 MG/1; MG/1
1 TABLET ORAL EVERY 4 HOURS PRN
COMMUNITY
End: 2017-07-27

## 2017-07-27 RX ORDER — HYDROCODONE BITARTRATE AND ACETAMINOPHEN 10; 325 MG/1; MG/1
1 TABLET ORAL EVERY 4 HOURS PRN
Qty: 90 TABLET | Refills: 0 | Status: SHIPPED | OUTPATIENT
Start: 2017-07-27 | End: 2017-08-14

## 2017-07-27 NOTE — PROGRESS NOTES
Neurosurgery Lumbar Consultation      HISTORY OF PRESENT Jimmie Reed is a 54year old female here in follow-up. ACDF C4-7 7/11/17    CS pain 8/10. Bilateral arm pain with reclining. No leg pain. Voice intact. Swallowing intact.  Overall, feel She has a history of having a L5-S1 fusion in 2006 by Dr. Fransisco Hernández at [facility] Preoperative she had severe back pain it 8 -10/ 10.  She has severe left L5 and S1 radiculopathy that was an 8–10/10.  She had left foot numbness and weakness.  After surgery and Fexofenadine HCl 180 MG Oral Tab  Take 180 mg by mouth daily.  Disp:   Rfl:     Atorvastatin Calcium (LIPITOR) 10 MG Oral Tab    Disp:   Rfl: 0      No current facility-administered medications on file prior to visit.     REVIEW OF SYSTEMS:  A 10-point syst 5.  C4-5-6-7 ACDF 7/11/17    PLAN:  1. Medrol Dosepak, Norco given  2. CS XR in 4 weeks  3. FU 4 weeks  4. May remove C collar to shower, eat and sleep  5. Bone stim    Dr Maximiliano Burkett evaluated the patient and is in agreement.       Leon Montesinos M.S., PA-

## 2017-07-27 NOTE — PROGRESS NOTES
Currently pt has pain rating at a 6 this pain is incisional. States pain down left side is pretty much gone. No longer is it a constant pain.  Pt. Describes a pulling down both arms when she lays on her back describes it as a very painful string being pulle

## 2017-07-27 NOTE — PATIENT INSTRUCTIONS
Refill policies:    • Allow 2-3 business days for refills; controlled substances may take longer.   • Contact your pharmacy at least 5 days prior to running out of medication and have them send an electronic request or submit request through the [facility] have a procedure or additional testing performed. [facility]) will contact your insurance carrier to obtain pre-certification or prior authorization.     Unfortunately, [facility] has seen an increase in denial of payment even though the p

## 2017-07-31 ENCOUNTER — TELEPHONE (OUTPATIENT)
Dept: SURGERY | Facility: CLINIC | Age: 52
End: 2017-07-31

## 2017-07-31 NOTE — TELEPHONE ENCOUNTER
Call patient at both home/cell and work #, pt was unavailabl. Left detailed message on personalized voice mail. Informed pt per  Jossie Vega she should have imaging done and then come in to the office this week. Order for Xray already in [facility].

## 2017-07-31 NOTE — TELEPHONE ENCOUNTER
Pt is calling regarding pain states since Thursday she has had pain across the entire back slightly above the buttocks, states entire left buttock radiating to the front side and groin. Pain currently 10/10.   States she has not taking any pain medication

## 2017-08-01 ENCOUNTER — HOSPITAL ENCOUNTER (OUTPATIENT)
Dept: GENERAL RADIOLOGY | Age: 52
Discharge: HOME | End: 2017-08-01
Attending: PHYSICIAN ASSISTANT
Payer: COMMERCIAL

## 2017-08-01 DIAGNOSIS — Z98.1 S/P CERVICAL SPINAL FUSION: ICD-10-CM

## 2017-08-01 DIAGNOSIS — M50.00 CERVICAL DISC DISEASE WITH MYELOPATHY: ICD-10-CM

## 2017-08-01 DIAGNOSIS — M50.20 CERVICAL DISC HERNIATION: ICD-10-CM

## 2017-08-01 PROCEDURE — 72040 X-RAY EXAM NECK SPINE 2-3 VW: CPT | Performed by: PHYSICIAN ASSISTANT

## 2017-08-14 ENCOUNTER — TELEPHONE (OUTPATIENT)
Dept: SURGERY | Facility: CLINIC | Age: 52
End: 2017-08-14

## 2017-08-14 ENCOUNTER — OFFICE VISIT (OUTPATIENT)
Dept: SURGERY | Facility: CLINIC | Age: 52
End: 2017-08-14

## 2017-08-14 VITALS — HEART RATE: 64 BPM | SYSTOLIC BLOOD PRESSURE: 120 MMHG | DIASTOLIC BLOOD PRESSURE: 80 MMHG

## 2017-08-14 DIAGNOSIS — G95.9 CERVICAL MYELOPATHY (HCC): ICD-10-CM

## 2017-08-14 DIAGNOSIS — Z98.1 S/P CERVICAL SPINAL FUSION: ICD-10-CM

## 2017-08-14 DIAGNOSIS — M54.16 LUMBAR RADICULOPATHY, RIGHT: ICD-10-CM

## 2017-08-14 DIAGNOSIS — M50.00 CERVICAL DISC DISEASE WITH MYELOPATHY: Primary | ICD-10-CM

## 2017-08-14 PROCEDURE — 99024 POSTOP FOLLOW-UP VISIT: CPT | Performed by: PHYSICIAN ASSISTANT

## 2017-08-14 NOTE — PROGRESS NOTES
Neurosurgery Lumbar FU      HISTORY OF PRESENT Radha Pettit is a 54year old female here in follow-up. CS pain 5/10. Right shoulder pain 7/10. Right middle finger numbness. No narcotics. Feeling better. LBP and leg numbness persists.  She is wear Last Hx 10/12/16  She has a history of having a L5-S1 fusion in 2006 by Dr. Charlie Paulson at [facility] Preoperative she had severe back pain it 8 -10/ 10.  She has severe left L5 and S1 radiculopathy that was an 8–10/10.  She had left foot numbness and weakness.   AmLODIPine Besylate 5 MG Oral Tab    Disp:   Rfl: 1    Fexofenadine HCl 180 MG Oral Tab  Take 180 mg by mouth daily.  Disp:   Rfl:     Atorvastatin Calcium (LIPITOR) 10 MG Oral Tab    Disp:   Rfl: 0      No current facility-administered medications on file 3.  Lumbar radiculopathy in the left L3-4 dermatome and do into the right L5 dermatome  4.  Chronic left L5 and S1 radiculopathy  5. C4-5-6-7 ACDF 7/11/17    PLAN:  1. Begin PT  2. CS XR in 8 weeks  3. FU 8 weeks  4. DC CS collar  5. Bone stim  6.  RTW 9/

## 2017-08-14 NOTE — PROGRESS NOTES
Pt is here for post op appt to szuly 7/10/2017    Complains of headaches 7/10 daily   Tingling below posterior ribs   Pain down the right arm 3/10   Numbness and tingling in right fingertips primarily middle finger    Denies fevers, chills, or drainage from i

## 2017-08-14 NOTE — PATIENT INSTRUCTIONS
Refill policies:    • Allow 2-3 business days for refills; controlled substances may take longer.   • Contact your pharmacy at least 5 days prior to running out of medication and have them send an electronic request or submit request through the [facility] have a procedure or additional testing performed. [facility]) will contact your insurance carrier to obtain pre-certification or prior authorization.     Unfortunately, [facility] has seen an increase in denial of payment even though the p

## 2017-08-23 ENCOUNTER — OFFICE VISIT (OUTPATIENT)
Dept: PHYSICAL THERAPY | Age: 52
End: 2017-08-23
Attending: PHYSICIAN ASSISTANT
Payer: COMMERCIAL

## 2017-08-23 DIAGNOSIS — M50.00 CERVICAL DISC DISEASE WITH MYELOPATHY: ICD-10-CM

## 2017-08-23 DIAGNOSIS — Z98.1 S/P CERVICAL SPINAL FUSION: ICD-10-CM

## 2017-08-23 DIAGNOSIS — G95.9 CERVICAL MYELOPATHY (HCC): ICD-10-CM

## 2017-08-23 DIAGNOSIS — M54.16 LUMBAR RADICULOPATHY, RIGHT: ICD-10-CM

## 2017-08-23 PROCEDURE — 97162 PT EVAL MOD COMPLEX 30 MIN: CPT

## 2017-08-23 PROCEDURE — 97112 NEUROMUSCULAR REEDUCATION: CPT

## 2017-08-23 NOTE — PROGRESS NOTES
SPINE EVALUATION:   Referring Physician: Dr. Jovan Rowell  Diagnosis: Cervical Spine Dysfunction      Date of Service: 8/23/2017     PATIENT SUMMARY   Patricia Pugh is a 50year old y/o female who presents to therapy s/p ACSF C4 to C7 on 7/11/2017.  She rep motion: decreased L upper cervical mobility. Hypomobile C/T junction      Strength:   UE/Scapular     Cervical has decreased cranial and cervical flexor strength.     UE's globally at shoulders, elbows, wrist and digits  4-/5 to 4/5     Strength: R 45; contact me at Dept: [phone]    Sincerely,  Electronically signed by therapist: Torey Atkinson PT

## 2017-08-28 ENCOUNTER — OFFICE VISIT (OUTPATIENT)
Dept: PHYSICAL THERAPY | Age: 52
End: 2017-08-28
Attending: PHYSICIAN ASSISTANT
Payer: COMMERCIAL

## 2017-08-30 ENCOUNTER — OFFICE VISIT (OUTPATIENT)
Dept: PHYSICAL THERAPY | Age: 52
End: 2017-08-30
Attending: PHYSICIAN ASSISTANT
Payer: COMMERCIAL

## 2017-08-30 PROCEDURE — 97112 NEUROMUSCULAR REEDUCATION: CPT

## 2017-08-30 NOTE — PROGRESS NOTES
Dx:   s/p ACSF C4 to C7 on 7/11/2017. Authorized # of Visits:  25 available       Next MD visit: none scheduled  Fall Risk: standard         Precautions: n/a             Subjective: Reports neck pain with driving, has chores, bathing, looking up.     O to no difficulty.    -Improve cervical spine strength to WFL's so pt can maintain erect head posture, decrease cervical spine stress, when using computer, reading, etc..  -Improve scapular muscle strength and coordination so pt can reach overhead to place a

## 2017-08-31 ENCOUNTER — OFFICE VISIT (OUTPATIENT)
Dept: PHYSICAL THERAPY | Age: 52
End: 2017-08-31
Attending: PHYSICIAN ASSISTANT
Payer: COMMERCIAL

## 2017-08-31 PROCEDURE — 97112 NEUROMUSCULAR REEDUCATION: CPT

## 2017-08-31 PROCEDURE — 97140 MANUAL THERAPY 1/> REGIONS: CPT

## 2017-08-31 NOTE — PROGRESS NOTES
Dx:   s/p ACSF C4 to C7 on 7/11/2017. Authorized # of Visits:  25 available       Next MD visit: none scheduled  Fall Risk: standard         Precautions: n/a             Subjective: Neck stiffness decreased since yesterday's tx.   Continues to have rosalia Hook lying, ab brace with bent knee raises, 5 sec hold x 10 2 sets on L. Assessment:.  Reviewed and upgraded HEP as noted above to address cervical ROM deficits and to maintain progress at tx today.   Good results with tx to

## 2017-09-05 ENCOUNTER — OFFICE VISIT (OUTPATIENT)
Dept: PHYSICAL THERAPY | Age: 52
End: 2017-09-05
Attending: PHYSICIAN ASSISTANT
Payer: COMMERCIAL

## 2017-09-05 PROCEDURE — 97112 NEUROMUSCULAR REEDUCATION: CPT

## 2017-09-05 PROCEDURE — 97140 MANUAL THERAPY 1/> REGIONS: CPT

## 2017-09-05 NOTE — PROGRESS NOTES
Dx:   s/p ACSF C4 to C7 on 7/11/2017. Authorized # of Visits:  25 available       Next MD visit: none scheduled  Fall Risk: standard         Precautions: n/a             Subjective:  Neck soreness with driving, standing, sitting > 30-45 minutes.      O shoulder OH flexion with #2 wts x 15 2 sets         NM re ed:  -face door, B UE slides with cues for neutral spine, no cervical extension, x 10 NM re ed:   Add HEP:  -L and R cervical AROM with 5 sec hold x 10 each    -L and R UE D2 flex to ext PNF with red HEP.    Plan: Check HEP, focus on posture, cervical ROM, progress core ex's for lumbar spine (per MD orders).      Charges: NM re ed 3  man1      Total Timed Treatment: 50 min  Total Treatment Time: 50 min

## 2017-09-07 ENCOUNTER — OFFICE VISIT (OUTPATIENT)
Dept: PHYSICAL THERAPY | Age: 52
End: 2017-09-07
Attending: PHYSICIAN ASSISTANT
Payer: COMMERCIAL

## 2017-09-07 PROCEDURE — 97140 MANUAL THERAPY 1/> REGIONS: CPT

## 2017-09-07 PROCEDURE — 97112 NEUROMUSCULAR REEDUCATION: CPT

## 2017-09-07 NOTE — PROGRESS NOTES
Dx:   s/p ACSF C4 to C7 on 7/11/2017. Authorized # of Visits:  25 available       Next MD visit: none scheduled  Fall Risk: standard         Precautions: n/a             Subjective: Reports neck soreness continues.   Has noticed newer symptoms of upper NM re ed:  Cranial and cervical segmental flexion AAROM x 10 2 sets  -pt perform AAROM with towel  X 10 2 sets    -again with AROM to ~ 20* x 10  (HEP with AAROM and AROM)   Supine:  -L and R cervical AROM with distal scalenes fixated  8 min  Cranial and posture need to improve along with upper back ad scapular strength to attain remaining goals. .       Goals:   Improve cervical rotation AROM to WFL's so pt can safely operate car w/o restrictions or difficulty.  met  -Improve cervical extension AROM to [facility]

## 2017-09-12 ENCOUNTER — OFFICE VISIT (OUTPATIENT)
Dept: PHYSICAL THERAPY | Age: 52
End: 2017-09-12
Attending: PHYSICIAN ASSISTANT
Payer: COMMERCIAL

## 2017-09-12 PROCEDURE — 97140 MANUAL THERAPY 1/> REGIONS: CPT

## 2017-09-12 PROCEDURE — 97112 NEUROMUSCULAR REEDUCATION: CPT

## 2017-09-12 NOTE — PROGRESS NOTES
Dx:   s/p ACSF C4 to C7 on 7/11/2017. Authorized # of Visits:  25 available       Next MD visit: none scheduled  Fall Risk: standard         Precautions: n/a             Subjective: Little to no aches at cervical region. Doing HEP as asked.  L LE refe AROM, flex, ext, L and R rotation  6 min NM re ed:  -sit, neutral spine,manual light resistance with cervical AROM, flex, ext, L and R rotation  6 min NM re ed:    Supine:  Cervical AROM with 5 sec holds various degrees of flexion  5 min    -sit, neutral s #1 wts x 15 2 sets NM re ed:  Back against door, neutral spine:  -B shoulder OH flexion with #2 wts x 15 2 sets   Lumbar stability tests (3)  All negative     Hook lying, ab brace with bent knee raises, 5 sec hold x 10 2 sets on L.

## 2017-09-14 ENCOUNTER — OFFICE VISIT (OUTPATIENT)
Dept: PHYSICAL THERAPY | Age: 52
End: 2017-09-14
Attending: PHYSICIAN ASSISTANT
Payer: COMMERCIAL

## 2017-09-14 PROCEDURE — 97110 THERAPEUTIC EXERCISES: CPT

## 2017-09-14 PROCEDURE — 97112 NEUROMUSCULAR REEDUCATION: CPT

## 2017-09-14 NOTE — PROGRESS NOTES
Dx:   s/p ACSF C4 to C7 on 7/11/2017. Authorized # of Visits:  22 available       Next MD visit: none scheduled  Fall Risk: standard         Precautions: n/a             Subjective: Little to no aches at cervical region at work today.   Doing HEP as as flexion AROM with manual tissue extensibility ROM at paraspinals  8 min NM re ed:  -sit, neutral spine,manula light resistance with cervical AROM, flex, ext, L and R rotation  6 min NM re ed:  -sit, neutral spine,manual light resistance with cervical AROM, with 5 sec hold x 10 each    -L and R UE D2 flex to ext PNF with red band x 15 each NM re ed:  -ab brace in hook lying, 10 sec hold x 10 reps    -as above with alternate bent knee raises x 10 each 2 sets   NM re ed:  -ab brace with marching in hook lyng  - kitchen cabinets when retrieving food products with little to no difficulty.  met  -Improve cervical spine strength to WFL's so pt can maintain erect head posture, decrease cervical spine stress, when using computer, reading, etc..  -Improve scapular muscle

## 2017-09-19 ENCOUNTER — OFFICE VISIT (OUTPATIENT)
Dept: PHYSICAL THERAPY | Age: 52
End: 2017-09-19
Attending: PHYSICIAN ASSISTANT
Payer: COMMERCIAL

## 2017-09-19 PROCEDURE — 97112 NEUROMUSCULAR REEDUCATION: CPT

## 2017-09-19 NOTE — PROGRESS NOTES
Dx:   s/p ACSF C4 to C7 on 7/11/2017. Authorized # of Visits:  25 available       Next MD visit: none scheduled  Fall Risk: standard         Precautions: n/a             Subjective: No issues with cervical spine since last seen.   Still has daily L lat reps  -Alternate UE deltoid punches, 7.5 wt each x 15 each    Supine: cranial flexion with bio feed back pressure cuff, initial 20 mm hg with 10 sec holds, progress to 28 mm hg  6 min  NM re ed:  -sit: cervical and thoracic segmental flexion AROM with manu flex to ext PNF with red band x 15 eachNM re ed:  Back against door, neutral spine:  -B shoulder OH flexion with #2 wts x 15 2 sets  NM re ed:  1 and 2 joint hip flexor test + for restrictions L and R.    Passive stretches for above  10 min totally symptoms produced. All 3 lumbar spine stability tests negative. Goals 1 and 2 met. Goals:   Improve cervical rotation AROM to WFL's so pt can safely operate car w/o restrictions or difficulty.  met  -Improve cervical extension AROM to WFL's so pt can

## 2017-09-26 ENCOUNTER — OFFICE VISIT (OUTPATIENT)
Dept: PHYSICAL THERAPY | Age: 52
End: 2017-09-26
Attending: PHYSICIAN ASSISTANT
Payer: COMMERCIAL

## 2017-09-26 PROCEDURE — 97140 MANUAL THERAPY 1/> REGIONS: CPT

## 2017-09-26 PROCEDURE — 97112 NEUROMUSCULAR REEDUCATION: CPT

## 2017-09-26 NOTE — PROGRESS NOTES
Dx:   s/p ACSF C4 to C7 on 7/11/2017.       Authorized # of Visits:  25 available       Next MD visit: none scheduled  Fall Risk: standard         Precautions: n/a             Subjective:  Reports slow onset of upper and lower cervical tightness and pain L> grade 3,4, 1st and 2nd rib inferior glides  12 min NM re ed:  Cable machine:  -B scap rows, neutral,  #15 wt x 20 reps  -Alternate UE deltoid punches, 7.5 wt each x 15 each  NM re ed:  Cable machine:  -B scap rows, neutral,  #20 wt x 15 reps  -Alternate UE against door, neutral spine:  -B shoulder OH flexion with #2 wts x 15 2 sets   NM re ed:  Using biofeedback pressure cuff with  -ab brace in hook lying, progress to 10 second holds  -progress to with marching in hook lyng  -progress to with SLR's  -kristin 15 2 sets   Lumbar stability tests (3)  All negative There ex:  1 and 2 joint hip muscle length test + each side for 1 joint short  NM re ed:  HEP:  L and R upper cervical rotation AROM x 10 each    Cranial and cervican segment flexion AROM x 10     Hook l substitution or pain. In progress  -Independent in progressive HEP. Plan: Check for carryover c spine AROM, posture with bi focals and computer.        Charges: NM re ed 2 man 2    Total Timed Treatment: 50 min  Total Treatment Time: 50 min

## 2017-09-28 ENCOUNTER — OFFICE VISIT (OUTPATIENT)
Dept: PHYSICAL THERAPY | Age: 52
End: 2017-09-28
Attending: PHYSICIAN ASSISTANT
Payer: COMMERCIAL

## 2017-09-28 PROCEDURE — 97140 MANUAL THERAPY 1/> REGIONS: CPT

## 2017-09-28 PROCEDURE — 97112 NEUROMUSCULAR REEDUCATION: CPT

## 2017-09-28 NOTE — PROGRESS NOTES
Dx:   s/p ACSF C4 to C7 on 7/11/2017. Authorized # of Visits:  25 available       Next MD visit: none scheduled  Fall Risk: standard         Precautions: n/a             Subjective:  Recent neck complaints slowly improving. No recent UE symptoms. NM re ed:  Luke Briggs machine:  -B scap rows, neutral,  #15 wt x 20 reps  -Alternate UE deltoid punches, 7.5 wt each x 15 each  NM re ed:  Cable machine:  -B scap rows, neutral,  #20 wt x 15 reps  -Alternate UE deltoid punches, 7.5 wt each x 15 each  Man PT:  S AAROM x 15  -pt perform AAROM with towel  X 15  -AROM x 10    10 min     NM re ed:   Add HEP:  -L and R cervical AROM with 5 sec hold x 10 each    -L and R UE D2 flex to ext PNF with red band x 15 eachNM re ed:  Back against door, neutral spine:  -B shoulde grade 3,4 mobes for flexion/gapping  5 min  NM re ed:  -sit, neutral spine,manual light resistance with cervical AROM, flex, ext, L and R rotation and side bend  6 min NM re ed:   Add HEP:  -cervical L and R rotation with 5 second holds  5 min  -supine segm substitution or pain. In progress  -Independent in progressive HEP. Plan: Check for carryover c spine AROM, posture.     Charges: NM re ed 3 man 1 Total Timed Treatment: 50 min  Total Treatment Time: 50 min

## 2017-10-03 ENCOUNTER — OFFICE VISIT (OUTPATIENT)
Dept: PHYSICAL THERAPY | Age: 52
End: 2017-10-03
Attending: PHYSICIAN ASSISTANT
Payer: COMMERCIAL

## 2017-10-03 PROCEDURE — 97140 MANUAL THERAPY 1/> REGIONS: CPT

## 2017-10-03 PROCEDURE — 97112 NEUROMUSCULAR REEDUCATION: CPT

## 2017-10-04 NOTE — PROGRESS NOTES
Dx:   s/p ACSF C4 to C7 on 7/11/2017. Authorized # of Visits:  25 available       Next MD visit: none scheduled  Fall Risk: standard         Precautions: n/a             Subjective:  Recent neck complaints slowly improving. No recent UE symptoms.  Hea R C/T  upper thoracic down and back mobes, grade 3,4, 1st and 2nd rib inferior glides  12 min NM re ed:  Cable machine:  -B scap rows, neutral,  #15 wt x 20 reps  -Alternate UE deltoid punches, 7.5 wt each x 15 each  NM re ed:  Cable machine:  -B scap rows and R C/T  upper thoracic down and back mobes, grade 3,4,   5 min  -supine: grade 3,4 mobes for O/A side glides,  and C1/C2 rotation  8 min   NM re ed:  Cranial and cervical segmental flexion AAROM x 10 2 sets  -pt perform AAROM with towel  X 10 2 sets 5 sec hold x 10 each    -L and R UE D2 flex to ext PNF with red band x 15 each NM re ed:  -ab brace in hook lying, 10 sec hold x 10 reps    -as above with alternate bent knee raises x 10 each 2 sets   NM re ed:  -ab brace with marching in hook lyng  -progr It was determined that pt has had previous eye exam ~ 1 year ago and was to have new prescription. Did not fill. Pt agrees next step is to f/u and have vision checked, make needed corrections, assess headache changes.       All upper cervical and VBI test

## 2017-10-05 ENCOUNTER — OFFICE VISIT (OUTPATIENT)
Dept: PHYSICAL THERAPY | Age: 52
End: 2017-10-05
Attending: PHYSICIAN ASSISTANT
Payer: COMMERCIAL

## 2017-10-05 PROCEDURE — 97112 NEUROMUSCULAR REEDUCATION: CPT

## 2017-10-05 NOTE — PROGRESS NOTES
Dx:   s/p ACSF C4 to C7 on 7/11/2017. Authorized # of Visits:  22 available       Next MD visit: none scheduled  Fall Risk: standard         Precautions: n/a             Subjective:  No headaches today due to didn't work, not feeling well.   Didn't req flexion AROM x 10 NM re ed:  Review HEP:  L and R upper cervical rotation AROM x 10 each  Cranial and cervica segment flexion sitting AROM x 10    cervical L and R rotation with 5 second holds   NM re ed:  Supine: cervical flexion segmental AROM x 10 3 set 15 2 sets  NM re ed:  1 and 2 joint hip flexor test + for restrictions L and R.    Passive stretches for above  10 min totally         NM re ed:  Passive stretches for 1 and 2 jt hip flexors  6 min totally    -seated, cervical neutral spine, flex, ext, L a Tolerated cervical and lumbar stability ex's well, no L LE pain or cervical complaints. Progressed lumbar HEP as noted above, HO issued. Compliancy with cervical HEP good. All upper cervical and VBI testing negative, no symptoms produced.   All 3 crista

## 2017-10-10 ENCOUNTER — APPOINTMENT (OUTPATIENT)
Dept: PHYSICAL THERAPY | Age: 52
End: 2017-10-10
Attending: PHYSICIAN ASSISTANT
Payer: COMMERCIAL

## 2017-10-11 ENCOUNTER — APPOINTMENT (OUTPATIENT)
Dept: PHYSICAL THERAPY | Age: 52
End: 2017-10-11
Attending: PHYSICIAN ASSISTANT
Payer: COMMERCIAL

## 2017-10-17 ENCOUNTER — OFFICE VISIT (OUTPATIENT)
Dept: PHYSICAL THERAPY | Age: 52
End: 2017-10-17
Attending: PHYSICIAN ASSISTANT
Payer: COMMERCIAL

## 2017-10-17 PROCEDURE — 97112 NEUROMUSCULAR REEDUCATION: CPT

## 2017-10-17 PROCEDURE — 97110 THERAPEUTIC EXERCISES: CPT

## 2017-10-17 NOTE — PROGRESS NOTES
Dx:   s/p ACSF C4 to C7 on 7/11/2017.       Authorized # of Visits:  22 available       Next MD visit: none scheduled  Fall Risk: standard         Precautions: n/a             Subjective:   [place] reports she remains with sporadic and self limiting L UE \"tin cervical extension  8 min Man PT:  Sit:  L and R C/T  upper thoracic down and back mobes, grade 3,4, 1st and 2nd rib inferior glides  12 min NM re ed:  Cable machine:  -B scap rows, neutral,  #15 wt x 20 reps  -Alternate UE deltoid punches, 7.5 wt each x 1 grade 3,4 mobes for O/A side glides,  and C1/C2 rotation  8 min NM re ed:  -prone B scap retract with horizontal ABD x 10 3 sets    Prone: alt UE/LE raises x10 2 sets  (add HEP) All 4 UE neural tension test negative      LE neural tension test negative:  - flexion/gapping  5 min  NM re ed:  -sit, neutral spine,manual light resistance with cervical AROM, flex, ext, L and R rotation and side bend  6 min NM re ed:   Add HEP:  -cervical L and R rotation with 5 second holds  5 min  -supine segmental cervical flexi 45 min

## 2017-10-19 ENCOUNTER — OFFICE VISIT (OUTPATIENT)
Dept: SURGERY | Facility: CLINIC | Age: 52
End: 2017-10-19

## 2017-10-19 VITALS — DIASTOLIC BLOOD PRESSURE: 80 MMHG | SYSTOLIC BLOOD PRESSURE: 112 MMHG | HEART RATE: 68 BPM

## 2017-10-19 DIAGNOSIS — M48.062 LUMBAR STENOSIS WITH NEUROGENIC CLAUDICATION: ICD-10-CM

## 2017-10-19 DIAGNOSIS — M96.1 FAILED BACK SYNDROME, LUMBOSACRAL: ICD-10-CM

## 2017-10-19 DIAGNOSIS — M54.16 LUMBAR RADICULOPATHY, RIGHT: Primary | ICD-10-CM

## 2017-10-19 PROCEDURE — 99213 OFFICE O/P EST LOW 20 MIN: CPT | Performed by: PHYSICIAN ASSISTANT

## 2017-10-19 NOTE — PROGRESS NOTES
Neurosurgery Lumbar FU      HISTORY OF PRESENT David Catalan is a 54year old female here in follow-up. Neck pain better. Some \"clicking\". Left arm tingling, Upper TS tingling. Lasting 15 minutes. RTW weeks ago.  Left second distal finger \"gets she also having cervical pain 5/10 radiating to the left scapula. She complains of left arm pain going in the C7 dermatome with numbness and tingling. She complains of left hand weakness.     States she had some recent laboratory studies showing elevated reports that she has been smoking.  She has never used smokeless tobacco. She reports that she drinks alcohol. She reports that she does not use illicit drugs.     ALLERGIES:    Ceclor                  Hives    MEDICATIONS:    Current Outpatient Prescripti MRI of the lumbar spine 6/7/17 shows surgical changes at L5-S1. Minimal additional spondylitic changes at multiple levels with no significant stenosis or acute herniation.   Unchanged from MRI from 2016      Lumbar x-rays      Lumbar myelogram: L5-S1 fusio

## 2017-10-19 NOTE — PROGRESS NOTES
LOV 8/14/2017    Cervical pain 8-0/29  Clicking sound in cervical area when during PT  Tingling has returned in neck,radiating to upper back and in hands

## 2017-10-19 NOTE — PATIENT INSTRUCTIONS
Refill policies:    • Allow 2-3 business days for refills; controlled substances may take longer.   • Contact your pharmacy at least 5 days prior to running out of medication and have them send an electronic request or submit request through the [facility] have a procedure or additional testing performed. [facility]) will contact your insurance carrier to obtain pre-certification or prior authorization.     Unfortunately, [facility] has seen an increase in denial of payment even though the p

## 2017-10-26 ENCOUNTER — HOSPITAL ENCOUNTER (OUTPATIENT)
Dept: CT IMAGING | Facility: HOSPITAL | Age: 52
Discharge: HOME | End: 2017-10-26
Attending: PHYSICIAN ASSISTANT
Payer: COMMERCIAL

## 2017-10-26 DIAGNOSIS — M96.1 FAILED BACK SYNDROME, LUMBOSACRAL: ICD-10-CM

## 2017-10-26 DIAGNOSIS — M54.16 LUMBAR RADICULOPATHY, RIGHT: ICD-10-CM

## 2017-10-26 DIAGNOSIS — M48.062 LUMBAR STENOSIS WITH NEUROGENIC CLAUDICATION: ICD-10-CM

## 2017-10-26 PROCEDURE — 72131 CT LUMBAR SPINE W/O DYE: CPT | Performed by: PHYSICIAN ASSISTANT

## 2017-11-16 ENCOUNTER — HOSPITAL ENCOUNTER (OUTPATIENT)
Dept: GENERAL RADIOLOGY | Age: 52
Discharge: HOME | End: 2017-11-16
Attending: PHYSICIAN ASSISTANT
Payer: COMMERCIAL

## 2017-11-16 ENCOUNTER — OFFICE VISIT (OUTPATIENT)
Dept: SURGERY | Facility: CLINIC | Age: 52
End: 2017-11-16

## 2017-11-16 VITALS — SYSTOLIC BLOOD PRESSURE: 120 MMHG | HEART RATE: 64 BPM | DIASTOLIC BLOOD PRESSURE: 82 MMHG

## 2017-11-16 DIAGNOSIS — Z98.1 S/P CERVICAL SPINAL FUSION: ICD-10-CM

## 2017-11-16 DIAGNOSIS — M50.00 CERVICAL DISC DISEASE WITH MYELOPATHY: Primary | ICD-10-CM

## 2017-11-16 PROCEDURE — 99213 OFFICE O/P EST LOW 20 MIN: CPT | Performed by: PHYSICIAN ASSISTANT

## 2017-11-16 PROCEDURE — 72040 X-RAY EXAM NECK SPINE 2-3 VW: CPT | Performed by: PHYSICIAN ASSISTANT

## 2017-11-16 NOTE — PROGRESS NOTES
Neurosurgery Lumbar FU      HISTORY OF PRESENT John Galeano is a 50year old female here in follow-up. Her neck pain is the same HEP has helped. PT helped as well. SHe is having significant back pain.  She has CT LS spine and would like back surg Her back pain is a 10/10. Radiating down the left leg in the L4-L5 and S1 dermatomes. Occasionally she gets shooting into the left groin she is having symptoms in the right leg as well with numbness and tingling and cramping in her right foot.   Has any b   REMOVAL OF TONSILS,12+ Y/O            FAMILY HISTORY:  family history includes Cancer in her brother; Diabetes in her maternal grandmother; Hypertension in her mother.     SOCIAL HISTORY:   reports that she has been smoking.  She has never used smokeless MRI of the cervical spine from 6/7/17 she was in acute central left-sided C4-5 herniation with cord compression and left exiting root compression. Diffuse spondylosis at C5-6 with cord compression. Spondylosis at C6-7.                 MRI of the lumbar sp

## 2017-11-16 NOTE — PATIENT INSTRUCTIONS
Refill policies:    • Allow 2-3 business days for refills; controlled substances may take longer.   • Contact your pharmacy at least 5 days prior to running out of medication and have them send an electronic request or submit request through the [facility] have a procedure or additional testing performed. [facility]) will contact your insurance carrier to obtain pre-certification or prior authorization.     Unfortunately, [facility] has seen an increase in denial of payment even though the p

## 2017-11-17 ENCOUNTER — TELEPHONE (OUTPATIENT)
Dept: SURGERY | Facility: CLINIC | Age: 52
End: 2017-11-17

## 2017-11-17 NOTE — TELEPHONE ENCOUNTER
Patient wants to know if bowel incontinence, which is worsening, is related to cervical fusion and lumbar issue. Please call.

## 2017-11-17 NOTE — TELEPHONE ENCOUNTER
Spoke with patient. LOV 11/16/17, states she failed to mention her bowel movements for the past 2 weeks have been uncharacteristic for her; small, 2-3 times daily, and accompanied by abdominal cramping with gas.   I reviewed with the patient the difference

## 2017-11-27 ENCOUNTER — HOSPITAL ENCOUNTER (OUTPATIENT)
Dept: GENERAL RADIOLOGY | Facility: HOSPITAL | Age: 52
Discharge: HOME | End: 2017-11-27
Attending: NEUROLOGICAL SURGERY
Payer: COMMERCIAL

## 2017-11-27 ENCOUNTER — OFFICE VISIT (OUTPATIENT)
Dept: SURGERY | Facility: CLINIC | Age: 52
End: 2017-11-27

## 2017-11-27 VITALS
HEIGHT: 67 IN | HEART RATE: 73 BPM | RESPIRATION RATE: 16 BRPM | WEIGHT: 225 LBS | BODY MASS INDEX: 35.31 KG/M2 | DIASTOLIC BLOOD PRESSURE: 72 MMHG | SYSTOLIC BLOOD PRESSURE: 124 MMHG

## 2017-11-27 DIAGNOSIS — G89.29 CHRONIC LEFT-SIDED LOW BACK PAIN WITH LEFT-SIDED SCIATICA: ICD-10-CM

## 2017-11-27 DIAGNOSIS — M54.42 CHRONIC LEFT-SIDED LOW BACK PAIN WITH LEFT-SIDED SCIATICA: Primary | ICD-10-CM

## 2017-11-27 DIAGNOSIS — G89.29 CHRONIC LEFT-SIDED LOW BACK PAIN WITH LEFT-SIDED SCIATICA: Primary | ICD-10-CM

## 2017-11-27 DIAGNOSIS — M54.42 CHRONIC LEFT-SIDED LOW BACK PAIN WITH LEFT-SIDED SCIATICA: ICD-10-CM

## 2017-11-27 PROCEDURE — 72082 X-RAY EXAM ENTIRE SPI 2/3 VW: CPT | Performed by: NEUROLOGICAL SURGERY

## 2017-11-27 PROCEDURE — 99214 OFFICE O/P EST MOD 30 MIN: CPT | Performed by: NEUROLOGICAL SURGERY

## 2017-11-27 PROCEDURE — 72114 X-RAY EXAM L-S SPINE BENDING: CPT | Performed by: NEUROLOGICAL SURGERY

## 2017-11-27 NOTE — PROGRESS NOTES
[facility]  Neurosurgery Clinic Visit    HISTORY OF PRESENT ILLNESS:  Dena Sellers is a(n) 50year old female with a history of L5-S1 fusion by Dr. Shena Mcintosh 9 years ago, C4-7 ACDF with Dr. Diogenes Samuels on 7/11/17.   She has chronic back pain th includes Breast Cancer (age of onset: 27) in her maternal cousin female; Breast Cancer (age of onset: [de-identified]) in her maternal aunt; Cancer in her brother; Diabetes in her maternal grandmother; Hypertension in her mother.     SOCIAL HISTORY:   reports that she h x-ray  -referral to Dr. Efren Wick for pain control, consider another cycle of injections  -patient to see her PCP   -needs to quit smoking before any further surgery would be possible   -recommend evaluation of bone quality and supplementation  -RTC in 6 weeks

## 2017-11-27 NOTE — PATIENT INSTRUCTIONS
Refill policies:    • Allow 2-3 business days for refills; controlled substances may take longer.   • Contact your pharmacy at least 5 days prior to running out of medication and have them send an electronic request or submit request through the [facility] have a procedure or additional testing performed. [facility]) will contact your insurance carrier to obtain pre-certification or prior authorization.     Unfortunately, [facility] has seen an increase in denial of payment even though the p

## 2017-11-30 ENCOUNTER — OFFICE VISIT (OUTPATIENT)
Dept: SURGERY | Facility: CLINIC | Age: 52
End: 2017-11-30

## 2017-11-30 VITALS
HEART RATE: 80 BPM | RESPIRATION RATE: 16 BRPM | DIASTOLIC BLOOD PRESSURE: 64 MMHG | BODY MASS INDEX: 35.31 KG/M2 | SYSTOLIC BLOOD PRESSURE: 122 MMHG | HEIGHT: 67 IN | WEIGHT: 225 LBS

## 2017-11-30 DIAGNOSIS — M47.16 OSTEOARTHRITIS OF SPINE WITH MYELOPATHY, LUMBOSACRAL REGION: ICD-10-CM

## 2017-11-30 DIAGNOSIS — M96.1 FAILED BACK SURGICAL SYNDROME: ICD-10-CM

## 2017-11-30 DIAGNOSIS — M54.16 LUMBAR RADICULITIS: Primary | ICD-10-CM

## 2017-11-30 PROCEDURE — 99214 OFFICE O/P EST MOD 30 MIN: CPT | Performed by: PHYSICIAN ASSISTANT

## 2017-11-30 RX ORDER — GABAPENTIN 100 MG/1
100 CAPSULE ORAL 3 TIMES DAILY
Qty: 90 CAPSULE | Refills: 0 | Status: SHIPPED | OUTPATIENT
Start: 2017-11-30 | End: 2018-03-26

## 2017-11-30 NOTE — PROGRESS NOTES
Name: Ryan Casey   : 1965   DOS: 2017     Pain Clinic Follow Up Visit:   Patient presents with:   Other: follow up for back injections      Ryan Casey is a 50year old female who presents for recheck of failed back fusion 11 years ag Patient is a(n) 50year old year old female in no acute distress. Neurologic[de-identified] WNL-Orientation to time, place and person, normal mood & affect, concentration & attention span intact.    Inspection:  Ambulates with well-coordinated, fluid, non-antalgic gait (CPT=72082)     TECHNIQUE:  2 to 3 views of the thoraco-lumbar spine was obtained. COMPARISON:  None.      INDICATIONS:  G89.29 Other chronic pain M54.42 Lumbago with sciatica, left side     PATIENT STATED HISTORY: (As transcribed by Technologist)  Th kidney measuring 2 mm. BONES:  Vertebral bodies appear overall maintained in height. Expected postoperative changes are seen at L5-S1. LUMBAR DISC LEVELS:  L1-L2:  No significant disc/facet abnormality, spinal stenosis, or foraminal stenosis.   L2-L3: myelopathy, lumbosacral region    Pain is  limiting functional status. Failed conservative treatment consisting of medications, activity modification, and  PT. 1.D/W DR Orona Rajiv pt to come in for left TLESI at L4-5 and L5-S1 with sedation x3  2.  Start Shaka Kennedy

## 2017-11-30 NOTE — PATIENT INSTRUCTIONS
Refill policies:    • Allow 2-3 business days for refills; controlled substances may take longer.   • Contact your pharmacy at least 5 days prior to running out of medication and have them send an electronic request or submit request through the [facility] have a procedure or additional testing performed. [facility]) will contact your insurance carrier to obtain pre-certification or prior authorization.     Unfortunately, [facility] has seen an increase in denial of payment even though the p outpatient registration in the [employer]. • Please note-No prescriptions will be written by Pain Clinic in [place] on the day of procedure. If you require a refill of medications, please contact the office 48 hours prior to your procedure.   • If you have an prior.    Post-procedure instructions:        Please schedule a follow up visit within 6 to 10 weeks after your last procedure date   Please call our office with any questions or concerns before or after your procedure at [phone].   If you are a diabet reduce pain, tingling and numbness and other symptoms caused by such inflammation. Transformational injections can also be used to identify a specific spinal nerve root level, or levels, that are the pain generators. What is actually injected?   The trans and how much local anesthetic was used. Despite the numbness, most patients can still actively move their arm or leg. You may notice that your pain may be gone or diminished. This immediate effect is also due to the local anesthetic injected.  This will las any procedure there are risks, side effects and the possibility of complications. The most common side effect is pain from the actual injection once the local anesthetic wears off.  Other risks include but are not limited to: spinal puncture with headaches,

## 2017-12-04 ENCOUNTER — TELEPHONE (OUTPATIENT)
Dept: SURGERY | Facility: CLINIC | Age: 52
End: 2017-12-04

## 2017-12-05 ENCOUNTER — TELEPHONE (OUTPATIENT)
Dept: SURGERY | Facility: CLINIC | Age: 52
End: 2017-12-05

## 2017-12-26 ENCOUNTER — TELEPHONE (OUTPATIENT)
Dept: SURGERY | Facility: CLINIC | Age: 52
End: 2017-12-26

## 2017-12-27 ENCOUNTER — APPOINTMENT (OUTPATIENT)
Dept: PHYSICAL THERAPY | Age: 52
End: 2017-12-27
Attending: PHYSICIAN ASSISTANT
Payer: COMMERCIAL

## 2017-12-27 NOTE — TELEPHONE ENCOUNTER
Pt will need to have clearance to go to PT from Neurosurgery. She has grade 2 lithesis on MRI and I would feel more comfortable with her getting the OKAY from neurosurgery before going to PT for her back.

## 2017-12-28 ENCOUNTER — APPOINTMENT (OUTPATIENT)
Dept: PHYSICAL THERAPY | Age: 52
End: 2017-12-28
Attending: PHYSICIAN ASSISTANT
Payer: COMMERCIAL

## 2017-12-28 NOTE — TELEPHONE ENCOUNTER
Patient returned our call. Wasn't sure why physical therapy is being ordered because it hasn't helped her in the past and physical therapist didn't understand why it kept being ordered. States she is seeing Dr. Maile Culp in on the 8th.   States if [employer]

## 2017-12-28 NOTE — TELEPHONE ENCOUNTER
LM for pt that PT was ordered as I got message from RN that new PT ordered needed for 2018. If she does not want to go that's fine.

## 2017-12-28 NOTE — TELEPHONE ENCOUNTER
Okay per neurosurgery for her to go for PT for her back. Called pt to find out where she wants to go for PT. LMTCB regarding where she wants to go and for how many weeks.

## 2018-01-02 ENCOUNTER — APPOINTMENT (OUTPATIENT)
Dept: PHYSICAL THERAPY | Age: 53
End: 2018-01-02
Attending: PHYSICIAN ASSISTANT
Payer: COMMERCIAL

## 2018-01-04 ENCOUNTER — APPOINTMENT (OUTPATIENT)
Dept: PHYSICAL THERAPY | Age: 53
End: 2018-01-04
Attending: PHYSICIAN ASSISTANT
Payer: COMMERCIAL

## 2018-01-08 ENCOUNTER — OFFICE VISIT (OUTPATIENT)
Dept: SURGERY | Facility: CLINIC | Age: 53
End: 2018-01-08

## 2018-01-08 VITALS — HEART RATE: 64 BPM | SYSTOLIC BLOOD PRESSURE: 122 MMHG | DIASTOLIC BLOOD PRESSURE: 84 MMHG | RESPIRATION RATE: 16 BRPM

## 2018-01-08 DIAGNOSIS — M96.1 FAILED BACK SURGICAL SYNDROME: Primary | ICD-10-CM

## 2018-01-08 PROCEDURE — 99213 OFFICE O/P EST LOW 20 MIN: CPT | Performed by: NEUROLOGICAL SURGERY

## 2018-01-08 NOTE — PATIENT INSTRUCTIONS
Refill policies:    • Allow 2-3 business days for refills; controlled substances may take longer.   • Contact your pharmacy at least 5 days prior to running out of medication and have them send an electronic request or submit request through the [facility] have a procedure or additional testing performed. [facility]) will contact your insurance carrier to obtain pre-certification or prior authorization.     Unfortunately, [facility] has seen an increase in denial of payment even though the p

## 2018-01-08 NOTE — PROGRESS NOTES
Patient reports increased pain to left anterior thigh and into the knee. No weakness noted. Gets cramping to both feet.  Walking is becoming more difficult

## 2018-01-08 NOTE — PROGRESS NOTES
[facility]  Neurosurgery Clinic    HISTORY OF PRESENT ILLNESS:  Kei Grossman is a(n) 50year old female returns to discuss persistent, severe back pain with activity or axial loading and LLE pain mainly in the S1 distribution but als and negatives are noted in HPI. PHYSICAL EXAMINATION:  VITAL SIGNS: /84   Pulse 64   Resp 16   GENERAL:  Patient is a 50year old female in no acute distress.   NEUROLOGICAL:     Cognition: alert and oriented x 3, speech fluent    Cranial nerves:

## 2018-01-09 ENCOUNTER — APPOINTMENT (OUTPATIENT)
Dept: PHYSICAL THERAPY | Age: 53
End: 2018-01-09
Attending: PHYSICIAN ASSISTANT
Payer: COMMERCIAL

## 2018-01-11 ENCOUNTER — APPOINTMENT (OUTPATIENT)
Dept: PHYSICAL THERAPY | Age: 53
End: 2018-01-11
Attending: PHYSICIAN ASSISTANT
Payer: COMMERCIAL

## 2018-03-26 ENCOUNTER — OFFICE VISIT (OUTPATIENT)
Dept: SURGERY | Facility: CLINIC | Age: 53
End: 2018-03-26

## 2018-03-26 ENCOUNTER — TELEPHONE (OUTPATIENT)
Dept: SURGERY | Facility: CLINIC | Age: 53
End: 2018-03-26

## 2018-03-26 VITALS
WEIGHT: 232 LBS | BODY MASS INDEX: 36 KG/M2 | DIASTOLIC BLOOD PRESSURE: 78 MMHG | SYSTOLIC BLOOD PRESSURE: 114 MMHG | HEART RATE: 84 BPM | RESPIRATION RATE: 16 BRPM

## 2018-03-26 DIAGNOSIS — M54.42 CHRONIC BILATERAL LOW BACK PAIN WITH LEFT-SIDED SCIATICA: Primary | ICD-10-CM

## 2018-03-26 DIAGNOSIS — G89.29 CHRONIC BILATERAL LOW BACK PAIN WITH LEFT-SIDED SCIATICA: Primary | ICD-10-CM

## 2018-03-26 PROCEDURE — 99213 OFFICE O/P EST LOW 20 MIN: CPT | Performed by: NEUROLOGICAL SURGERY

## 2018-03-26 NOTE — PROGRESS NOTES
Patient here to follow up regarding lower back and left leg pain. States symptoms are the same since last visit.

## 2018-03-26 NOTE — TELEPHONE ENCOUNTER
You are scheduled for L5-S1 decompression and revision of fusion on 4/17/18 with      Pre-op instructions discussed with patient and surgical packet provided:     · You will need to contact the Pre-admission department at [phone] to schedule

## 2018-03-26 NOTE — PATIENT INSTRUCTIONS
Refill policies:    • Allow 2-3 business days for refills; controlled substances may take longer.   • Contact your pharmacy at least 5 days prior to running out of medication and have them send an electronic request or submit request through the [facility] for the entire amount billed. Precertification and Prior Authorizations  If your physician has recommended that you have a procedure or additional testing performed.   [employer] ([facility]) will contact your insurance carrier to obtain pr admission. · PCP clearance is needed  needed, please contact their office for appointment. · The hospital will contact you 1-2 days before surgery with your arrival time.    · If you were on blood thinners (such as Coumadin, Pradaxa, Xarelto, etc) prior t

## 2018-03-26 NOTE — PROGRESS NOTES
[facility]  Neurosurgery Clinic Visit     HISTORY OF PRESENT ILLNESS:  Dario Gonzalez is a(n) 50year old female previously seen in clinic for refractory back pain, persistent LLE pain in the L5/S1 distribution and suspicion of pseudoa visit.     REVIEW OF SYSTEMS:  A 10-point system was reviewed. Pertinent positives and negatives are noted in HPI.       PHYSICAL EXAMINATION:  VITAL SIGNS: /78   Pulse 84   Resp 16   Wt 232 lb   BMI 36.34 kg/m²   GENERAL:  Patient is a 50year old f

## 2018-03-28 ENCOUNTER — TELEPHONE (OUTPATIENT)
Dept: SURGERY | Facility: CLINIC | Age: 53
End: 2018-03-28

## 2018-03-28 DIAGNOSIS — M54.42 CHRONIC BILATERAL LOW BACK PAIN WITH LEFT-SIDED SCIATICA: Primary | ICD-10-CM

## 2018-03-28 DIAGNOSIS — G89.29 CHRONIC BILATERAL LOW BACK PAIN WITH LEFT-SIDED SCIATICA: Primary | ICD-10-CM

## 2018-03-28 NOTE — TELEPHONE ENCOUNTER
Per Patient has IHP. Referral to be started. CPT: ???  ICD-10: M54.42, G89.29, M96.1    Awaiting response back from coders to check CPT codes.

## 2018-03-28 NOTE — TELEPHONE ENCOUNTER
FMLA and STD forms initiated, patient does not qualify for disability as defined on the ADA form submitted. ADA form not completed.   Endorsed forms to Dr. Chalino Morgan for completion and signature

## 2018-03-28 NOTE — TELEPHONE ENCOUNTER
rcvd FMLA, ADA, STD paperwk to be filled out. Pt pd $25 fee OTP. Mailed receipt. Paperwork placed in nurses bin.  Please fax paperwork to [address] [facility] [place]#[phone] & mail a copy to patient

## 2018-04-02 ENCOUNTER — TELEPHONE (OUTPATIENT)
Dept: SURGERY | Facility: CLINIC | Age: 53
End: 2018-04-02

## 2018-04-02 NOTE — TELEPHONE ENCOUNTER
Discussed codes for surgery with . CPT: [id number], [id number], 37239, 91495, 59570  ICD-10: M54.42, G89.29, M96.1    IHP referral to be started.

## 2018-04-02 NOTE — PAT NURSING NOTE
Received a call from patient inquiring where she should have EKG and MRSA swab done since [facility] doesn't do MSSA/MRSA testing. Patient has HMO insurance. Mame Loras at PCP office and informed her of the above.  Mehdi Colunga states that she confirmed with her ma

## 2018-04-02 NOTE — TELEPHONE ENCOUNTER
Faxed completed forms, sent to [employer]. Mailed copy of completed forms and fax receipt to Pt home address.

## 2018-04-03 ENCOUNTER — LAB ENCOUNTER (OUTPATIENT)
Dept: LAB | Age: 53
End: 2018-04-03
Attending: FAMILY MEDICINE
Payer: COMMERCIAL

## 2018-04-03 ENCOUNTER — EKG ENCOUNTER (OUTPATIENT)
Dept: LAB | Age: 53
End: 2018-04-03
Attending: FAMILY MEDICINE
Payer: COMMERCIAL

## 2018-04-03 DIAGNOSIS — Z01.818 PREOP EXAMINATION: Primary | ICD-10-CM

## 2018-04-03 PROCEDURE — 93010 ELECTROCARDIOGRAM REPORT: CPT | Performed by: INTERNAL MEDICINE

## 2018-04-03 PROCEDURE — 87081 CULTURE SCREEN ONLY: CPT

## 2018-04-03 PROCEDURE — 93005 ELECTROCARDIOGRAM TRACING: CPT

## 2018-04-08 ENCOUNTER — HOSPITAL ENCOUNTER (OUTPATIENT)
Dept: MRI IMAGING | Facility: HOSPITAL | Age: 53
Discharge: HOME | End: 2018-04-08
Attending: NEUROLOGICAL SURGERY
Payer: COMMERCIAL

## 2018-04-08 DIAGNOSIS — G89.29 CHRONIC BILATERAL LOW BACK PAIN WITH LEFT-SIDED SCIATICA: ICD-10-CM

## 2018-04-08 DIAGNOSIS — M54.42 CHRONIC BILATERAL LOW BACK PAIN WITH LEFT-SIDED SCIATICA: ICD-10-CM

## 2018-04-08 PROCEDURE — 72158 MRI LUMBAR SPINE W/O & W/DYE: CPT | Performed by: NEUROLOGICAL SURGERY

## 2018-04-08 PROCEDURE — A9575 INJ GADOTERATE MEGLUMI 0.1ML: HCPCS | Performed by: NEUROLOGICAL SURGERY

## 2018-04-09 ENCOUNTER — TELEPHONE (OUTPATIENT)
Dept: SURGERY | Facility: CLINIC | Age: 53
End: 2018-04-09

## 2018-04-09 NOTE — TELEPHONE ENCOUNTER
Patient wants to know how long she will in the hospital, informed her that she will be in for 2-3 days. She has no one to stay with her after discharge but is arranging for friends to stop by if she needs any assistance.  I informed her it would be better i

## 2018-04-09 NOTE — TELEPHONE ENCOUNTER
pt has a couple of questions re: upcoming sx such as how long in hospital, etc. please call back so she can make arrangements

## 2018-04-09 NOTE — TELEPHONE ENCOUNTER
LMTCB. Need to go over patient's questions. Also, need to remind patient that she needs medical clearance for upcoming surgery.

## 2018-04-10 DIAGNOSIS — Z98.1 S/P CERVICAL SPINAL FUSION: Primary | ICD-10-CM

## 2018-04-10 NOTE — TELEPHONE ENCOUNTER
Attempted to reach PCP office-, but office was not yet open. Will need to call back at a later time to check on the status of medical clearance.

## 2018-04-10 NOTE — PROGRESS NOTES
Patient called to report hand tingling after MRI lumbar. Started in the whole hand and is now better but persists in the middle fingers, R>L.   Given this change and the plan for a 4+ hour surgery in the prone position, it is safest to get a new MRI of the

## 2018-04-11 ENCOUNTER — TELEPHONE (OUTPATIENT)
Dept: SURGERY | Facility: CLINIC | Age: 53
End: 2018-04-11

## 2018-04-11 NOTE — TELEPHONE ENCOUNTER
Spoke with PCP office- Farida who stated clearance approved and they will fax clearance note over. Fax # was provided.

## 2018-04-12 ENCOUNTER — TELEPHONE (OUTPATIENT)
Dept: SURGERY | Facility: CLINIC | Age: 53
End: 2018-04-12

## 2018-04-13 ENCOUNTER — HOSPITAL ENCOUNTER (OUTPATIENT)
Dept: MRI IMAGING | Age: 53
Discharge: HOME | End: 2018-04-13
Attending: NEUROLOGICAL SURGERY
Payer: COMMERCIAL

## 2018-04-13 DIAGNOSIS — Z98.1 S/P CERVICAL SPINAL FUSION: ICD-10-CM

## 2018-04-13 PROCEDURE — 72141 MRI NECK SPINE W/O DYE: CPT | Performed by: NEUROLOGICAL SURGERY

## 2018-04-16 ENCOUNTER — HOSPITAL ENCOUNTER (OUTPATIENT)
Dept: GENERAL RADIOLOGY | Age: 53
Discharge: HOME | End: 2018-04-16
Attending: FAMILY MEDICINE
Payer: COMMERCIAL

## 2018-04-16 DIAGNOSIS — J20.9 ACUTE BRONCHITIS: ICD-10-CM

## 2018-04-16 PROCEDURE — 71046 X-RAY EXAM CHEST 2 VIEWS: CPT | Performed by: FAMILY MEDICINE

## 2018-04-16 RX ORDER — METHYLPREDNISOLONE 4 MG/1
TABLET ORAL AS DIRECTED
COMMUNITY
End: 2018-05-01 | Stop reason: ALTCHOICE

## 2018-04-16 RX ORDER — AZITHROMYCIN 500 MG/1
500 TABLET, FILM COATED ORAL DAILY
Status: ON HOLD | COMMUNITY
End: 2018-05-04 | Stop reason: ALTCHOICE

## 2018-04-16 RX ORDER — ALBUTEROL SULFATE 90 UG/1
2 AEROSOL, METERED RESPIRATORY (INHALATION) EVERY 4 HOURS PRN
COMMUNITY
End: 2018-07-30 | Stop reason: ALTCHOICE

## 2018-04-16 NOTE — TELEPHONE ENCOUNTER
Pt is calling states she has a cold, was given antibiotic on Friday as preventative but symptoms have worsened as of yesterday morning. Sore throat, ear pain, pain in eyes and nose (clear drainage), cough with dark green sputum, denies any fevers.     Lori Grandchild

## 2018-04-16 NOTE — TELEPHONE ENCOUNTER
Patient called back stating she will need a letter faxed to her HR. Vladimir Crowell fax #: [phone]. Letter needs to state the cancellation and rescheduled surgery date. Letter generated and faxed to above fax #.       Awaiting additional PCP clear

## 2018-04-25 NOTE — TELEPHONE ENCOUNTER
Spoke to pt, who states she has an appointment with PCP for clearance at 4pm today 4/25/18.      Will contact their office for clearance after that time

## 2018-04-25 NOTE — TELEPHONE ENCOUNTER
LMTCB. Need to get condition update from patient and see if she has an appointment for clearance with PCP.

## 2018-04-26 NOTE — TELEPHONE ENCOUNTER
Received preop clearance for lumbar fusion scheduled for 5/4/18 from Dr. Alec Mckinney. Placed in nurse bin.

## 2018-04-26 NOTE — TELEPHONE ENCOUNTER
PCP clearance received. Patient cleared and fully recovered from recent URTI and is acceptable risk to proceed with surgery. Clearance sent to [facility]W PAT. Nothing further needed for upcoming surgery.

## 2018-05-01 ENCOUNTER — TELEPHONE (OUTPATIENT)
Dept: SURGERY | Facility: CLINIC | Age: 53
End: 2018-05-01

## 2018-05-01 ENCOUNTER — APPOINTMENT (OUTPATIENT)
Dept: GENERAL RADIOLOGY | Facility: HOSPITAL | Age: 53
End: 2018-05-01
Attending: EMERGENCY MEDICINE
Payer: COMMERCIAL

## 2018-05-01 ENCOUNTER — APPOINTMENT (OUTPATIENT)
Dept: CT IMAGING | Facility: HOSPITAL | Age: 53
End: 2018-05-01
Attending: EMERGENCY MEDICINE
Payer: COMMERCIAL

## 2018-05-01 ENCOUNTER — HOSPITAL ENCOUNTER (EMERGENCY)
Facility: HOSPITAL | Age: 53
Discharge: HOME | End: 2018-05-01
Attending: EMERGENCY MEDICINE
Payer: COMMERCIAL

## 2018-05-01 VITALS
HEART RATE: 87 BPM | HEIGHT: 67 IN | SYSTOLIC BLOOD PRESSURE: 131 MMHG | OXYGEN SATURATION: 97 % | TEMPERATURE: 97 F | WEIGHT: 235 LBS | RESPIRATION RATE: 16 BRPM | BODY MASS INDEX: 36.88 KG/M2 | DIASTOLIC BLOOD PRESSURE: 89 MMHG

## 2018-05-01 DIAGNOSIS — Z98.1 S/P CERVICAL SPINAL FUSION: Primary | ICD-10-CM

## 2018-05-01 DIAGNOSIS — S16.1XXA STRAIN OF NECK MUSCLE, INITIAL ENCOUNTER: Primary | ICD-10-CM

## 2018-05-01 DIAGNOSIS — G95.9 CERVICAL MYELOPATHY (HCC): ICD-10-CM

## 2018-05-01 PROCEDURE — 72040 X-RAY EXAM NECK SPINE 2-3 VW: CPT | Performed by: EMERGENCY MEDICINE

## 2018-05-01 PROCEDURE — 99284 EMERGENCY DEPT VISIT MOD MDM: CPT

## 2018-05-01 PROCEDURE — 70450 CT HEAD/BRAIN W/O DYE: CPT | Performed by: EMERGENCY MEDICINE

## 2018-05-01 RX ORDER — HYDROCODONE BITARTRATE AND ACETAMINOPHEN 10; 325 MG/1; MG/1
TABLET ORAL
Refills: 0 | Status: CANCELLED | OUTPATIENT
Start: 2018-05-01

## 2018-05-01 RX ORDER — CYCLOBENZAPRINE HCL 10 MG
TABLET ORAL
Refills: 0 | Status: CANCELLED | OUTPATIENT
Start: 2018-05-01

## 2018-05-01 RX ORDER — CYCLOBENZAPRINE HCL 10 MG
10 TABLET ORAL 3 TIMES DAILY PRN
Qty: 20 TABLET | Refills: 0 | Status: ON HOLD | OUTPATIENT
Start: 2018-05-01 | End: 2018-05-07

## 2018-05-01 NOTE — ED INITIAL ASSESSMENT (HPI)
Pt has a history of a cervical fusion in 07/2017. Pt states on Sunday she started having pain in the base of the posterior skill on the left that radiates down the neck into the shoulder area.    Pt is scheduled for a lumbar fusion on Friday and when she c

## 2018-05-02 NOTE — ED PROVIDER NOTES
Patient Seen in: [facility] Emergency Department    History   No chief complaint on file. Stated Complaint: neck pain    HPI    Patient is a 53-year-old female comes emergency room for evaluation of neck pain.   Patient with history of cervical fu All other systems reviewed and negative except as noted above.     Physical Exam   ED Triage Vitals [05/01/18 1706]  BP: 124/88  Pulse: 78  Resp: 16  Temp: (!) 97.3 °F (36.3 °C)  Temp src: Temporal  SpO2: 98 %  O2 Device: None (Room air)    Current:BP 1 No pneumothorax. CARDIAC:  Normal size cardiac silhouette. MEDIASTINUM:  Normal. PLEURA:  Normal.  No pleural effusions. BONES:  Hardware at the base of the cervical spine is noted. CONCLUSION:  No consolidation.      Dictated by: Kyleigh Almodovar ABY, MRI SPINE CERVICAL (CPT=72141), 6/07/2017, 12:57. INDICATIONS:  Z98.1 Arthrodesis status  TECHNIQUE:  Multiplanar T1 and T2 weighted images including fat suppression sequences. Images acquired in sagittal and axial planes.    PATIENT STATED H MD            Mri Spine Lumbar (w+wo) (cpt=72158)    Result Date: 4/8/2018  PROCEDURE:  MRI SPINE LUMBAR (W+WO) (CPT=72158)  COMPARISON:  [facility], MRI SPINE LUMBAR (W+WO) (CPT=72158), 6/07/2017, 13:39.   INDICATIONS:  G89.29 Other chronic pain M54.42 Lum significant abnormality. L3-4: There is a minimal diffuse disc bulge with a small superimposed left foraminal disc protrusion. There is mild facet arthropathy. There is no significant spinal canal or neural foraminal stenosis.  No significant interval nieto respect to C5. There is intervertebral disc graft material seen at C4-5, C5-6 and C6-7. There is probable incorporation and fusion at C4-5. Possible fusion at C6-7 also. Prevertebral  soft tissues do not appear thickened. No dislocation is seen.   No a MD Kumar 8  [phone]      As needed        Medications Prescribed:  Discharge Medication List as of 5/1/2018  7:21 PM    START taking these medications    Cyclobenzaprine HCl 10 MG Oral Tab  Take 1 tablet (10 mg total)

## 2018-05-02 NOTE — ED NOTES
RE-EVAL PER MD AND OK FOR DC WITH FU INST TO PMD IN 1-2 DAYS AS DIRECTED. QUESTIONS ANSWERED PER FIDELINA ARAMBULA. IN AGREEMENT WITH POC.  AMB + GAIT PER SELF

## 2018-05-04 ENCOUNTER — APPOINTMENT (OUTPATIENT)
Dept: GENERAL RADIOLOGY | Facility: HOSPITAL | Age: 53
DRG: 460 | End: 2018-05-04
Attending: NEUROLOGICAL SURGERY
Payer: COMMERCIAL

## 2018-05-04 ENCOUNTER — SURGERY (OUTPATIENT)
Age: 53
End: 2018-05-04

## 2018-05-04 ENCOUNTER — HOSPITAL ENCOUNTER (INPATIENT)
Facility: HOSPITAL | Age: 53
LOS: 3 days | Discharge: HOME | DRG: 460 | End: 2018-05-07
Attending: NEUROLOGICAL SURGERY | Admitting: NEUROLOGICAL SURGERY
Payer: COMMERCIAL

## 2018-05-04 DIAGNOSIS — M54.2 CERVICALGIA: Primary | ICD-10-CM

## 2018-05-04 DIAGNOSIS — G89.29 CHRONIC BILATERAL LOW BACK PAIN WITH LEFT-SIDED SCIATICA: ICD-10-CM

## 2018-05-04 DIAGNOSIS — M54.42 CHRONIC BILATERAL LOW BACK PAIN WITH LEFT-SIDED SCIATICA: ICD-10-CM

## 2018-05-04 PROBLEM — E78.5 DYSLIPIDEMIA: Status: ACTIVE | Noted: 2018-05-04

## 2018-05-04 PROBLEM — I10 HTN (HYPERTENSION): Status: ACTIVE | Noted: 2018-05-04

## 2018-05-04 PROBLEM — G47.33 OSA (OBSTRUCTIVE SLEEP APNEA): Status: ACTIVE | Noted: 2018-05-04

## 2018-05-04 PROCEDURE — 76001 XR FLUOROSCOPE EXAM >1 HR EXTENSIVE (CPT=76001): CPT | Performed by: NEUROLOGICAL SURGERY

## 2018-05-04 PROCEDURE — 0SG10AJ FUSION OF 2 OR MORE LUMBAR VERTEBRAL JOINTS WITH INTERBODY FUSION DEVICE, POSTERIOR APPROACH, ANTERIOR COLUMN, OPEN APPROACH: ICD-10-PCS | Performed by: NEUROLOGICAL SURGERY

## 2018-05-04 PROCEDURE — 99223 1ST HOSP IP/OBS HIGH 75: CPT | Performed by: HOSPITALIST

## 2018-05-04 DEVICE — BONE GRAFT KIT 7510200 INFUSE SMALL
Type: IMPLANTABLE DEVICE | Site: BACK | Status: FUNCTIONAL
Brand: INFUSE® BONE GRAFT

## 2018-05-04 RX ORDER — ONDANSETRON 2 MG/ML
4 INJECTION INTRAMUSCULAR; INTRAVENOUS AS NEEDED
Status: DISCONTINUED | OUTPATIENT
Start: 2018-05-04 | End: 2018-05-04 | Stop reason: HOSPADM

## 2018-05-04 RX ORDER — SODIUM PHOSPHATE, DIBASIC AND SODIUM PHOSPHATE, MONOBASIC 7; 19 G/133ML; G/133ML
1 ENEMA RECTAL ONCE AS NEEDED
Status: DISCONTINUED | OUTPATIENT
Start: 2018-05-04 | End: 2018-05-07

## 2018-05-04 RX ORDER — CLINDAMYCIN PHOSPHATE 600 MG/50ML
600 INJECTION INTRAVENOUS EVERY 8 HOURS
Status: DISCONTINUED | OUTPATIENT
Start: 2018-05-04 | End: 2018-05-07

## 2018-05-04 RX ORDER — CYCLOBENZAPRINE HCL 10 MG
10 TABLET ORAL 3 TIMES DAILY PRN
Status: DISCONTINUED | OUTPATIENT
Start: 2018-05-04 | End: 2018-05-07

## 2018-05-04 RX ORDER — HYDROMORPHONE HYDROCHLORIDE 1 MG/ML
0.2 INJECTION, SOLUTION INTRAMUSCULAR; INTRAVENOUS; SUBCUTANEOUS EVERY 2 HOUR PRN
Status: DISCONTINUED | OUTPATIENT
Start: 2018-05-04 | End: 2018-05-07

## 2018-05-04 RX ORDER — HYDROMORPHONE HYDROCHLORIDE 1 MG/ML
INJECTION, SOLUTION INTRAMUSCULAR; INTRAVENOUS; SUBCUTANEOUS
Status: COMPLETED
Start: 2018-05-04 | End: 2018-05-04

## 2018-05-04 RX ORDER — LABETALOL HYDROCHLORIDE 5 MG/ML
5 INJECTION, SOLUTION INTRAVENOUS EVERY 5 MIN PRN
Status: DISCONTINUED | OUTPATIENT
Start: 2018-05-04 | End: 2018-05-04 | Stop reason: HOSPADM

## 2018-05-04 RX ORDER — HYDROCODONE BITARTRATE AND ACETAMINOPHEN 5; 325 MG/1; MG/1
1 TABLET ORAL EVERY 4 HOURS PRN
Status: DISCONTINUED | OUTPATIENT
Start: 2018-05-04 | End: 2018-05-07

## 2018-05-04 RX ORDER — ALBUTEROL SULFATE 90 UG/1
2 AEROSOL, METERED RESPIRATORY (INHALATION) EVERY 4 HOURS PRN
Status: DISCONTINUED | OUTPATIENT
Start: 2018-05-04 | End: 2018-05-07

## 2018-05-04 RX ORDER — ATORVASTATIN CALCIUM 10 MG/1
10 TABLET, FILM COATED ORAL DAILY
Status: DISCONTINUED | OUTPATIENT
Start: 2018-05-05 | End: 2018-05-07

## 2018-05-04 RX ORDER — SENNOSIDES 8.6 MG
17.2 TABLET ORAL NIGHTLY
Status: DISCONTINUED | OUTPATIENT
Start: 2018-05-04 | End: 2018-05-07

## 2018-05-04 RX ORDER — CETIRIZINE HYDROCHLORIDE 10 MG/1
10 TABLET ORAL DAILY
Status: DISCONTINUED | OUTPATIENT
Start: 2018-05-05 | End: 2018-05-07

## 2018-05-04 RX ORDER — SODIUM CHLORIDE, SODIUM LACTATE, POTASSIUM CHLORIDE, CALCIUM CHLORIDE 600; 310; 30; 20 MG/100ML; MG/100ML; MG/100ML; MG/100ML
INJECTION, SOLUTION INTRAVENOUS CONTINUOUS
Status: DISCONTINUED | OUTPATIENT
Start: 2018-05-04 | End: 2018-05-04 | Stop reason: HOSPADM

## 2018-05-04 RX ORDER — DIPHENHYDRAMINE HYDROCHLORIDE 50 MG/ML
12.5 INJECTION INTRAMUSCULAR; INTRAVENOUS EVERY 4 HOURS PRN
Status: DISCONTINUED | OUTPATIENT
Start: 2018-05-04 | End: 2018-05-07

## 2018-05-04 RX ORDER — CYCLOBENZAPRINE HCL 10 MG
10 TABLET ORAL 3 TIMES DAILY PRN
Status: DISCONTINUED | OUTPATIENT
Start: 2018-05-04 | End: 2018-05-04

## 2018-05-04 RX ORDER — ACETAMINOPHEN 325 MG/1
650 TABLET ORAL EVERY 4 HOURS PRN
Status: DISCONTINUED | OUTPATIENT
Start: 2018-05-04 | End: 2018-05-07

## 2018-05-04 RX ORDER — HYDROCODONE BITARTRATE AND ACETAMINOPHEN 5; 325 MG/1; MG/1
2 TABLET ORAL EVERY 4 HOURS PRN
Status: DISCONTINUED | OUTPATIENT
Start: 2018-05-04 | End: 2018-05-07

## 2018-05-04 RX ORDER — NALBUPHINE HCL 10 MG/ML
2.5 AMPUL (ML) INJECTION EVERY 4 HOURS PRN
Status: DISCONTINUED | OUTPATIENT
Start: 2018-05-04 | End: 2018-05-07

## 2018-05-04 RX ORDER — NALOXONE HYDROCHLORIDE 0.4 MG/ML
0.08 INJECTION, SOLUTION INTRAMUSCULAR; INTRAVENOUS; SUBCUTANEOUS
Status: DISCONTINUED | OUTPATIENT
Start: 2018-05-04 | End: 2018-05-07

## 2018-05-04 RX ORDER — DIPHENHYDRAMINE HCL 25 MG
25 CAPSULE ORAL EVERY 4 HOURS PRN
Status: DISCONTINUED | OUTPATIENT
Start: 2018-05-04 | End: 2018-05-07

## 2018-05-04 RX ORDER — BISACODYL 10 MG
10 SUPPOSITORY, RECTAL RECTAL
Status: DISCONTINUED | OUTPATIENT
Start: 2018-05-04 | End: 2018-05-07

## 2018-05-04 RX ORDER — CEFAZOLIN SODIUM/WATER 2 G/20 ML
2 SYRINGE (ML) INTRAVENOUS EVERY 8 HOURS
Status: DISCONTINUED | OUTPATIENT
Start: 2018-05-04 | End: 2018-05-04 | Stop reason: SDUPTHER

## 2018-05-04 RX ORDER — HYDROMORPHONE HYDROCHLORIDE 1 MG/ML
0.4 INJECTION, SOLUTION INTRAMUSCULAR; INTRAVENOUS; SUBCUTANEOUS EVERY 5 MIN PRN
Status: DISCONTINUED | OUTPATIENT
Start: 2018-05-04 | End: 2018-05-04 | Stop reason: HOSPADM

## 2018-05-04 RX ORDER — CEFAZOLIN SODIUM/WATER 2 G/20 ML
2 SYRINGE (ML) INTRAVENOUS EVERY 8 HOURS
Status: DISCONTINUED | OUTPATIENT
Start: 2018-05-04 | End: 2018-05-04

## 2018-05-04 RX ORDER — HYDROMORPHONE HYDROCHLORIDE 1 MG/ML
0.4 INJECTION, SOLUTION INTRAMUSCULAR; INTRAVENOUS; SUBCUTANEOUS EVERY 30 MIN PRN
Status: DISCONTINUED | OUTPATIENT
Start: 2018-05-04 | End: 2018-05-07

## 2018-05-04 RX ORDER — ONDANSETRON 2 MG/ML
4 INJECTION INTRAMUSCULAR; INTRAVENOUS EVERY 6 HOURS PRN
Status: DISCONTINUED | OUTPATIENT
Start: 2018-05-04 | End: 2018-05-07

## 2018-05-04 RX ORDER — METOCLOPRAMIDE HYDROCHLORIDE 5 MG/ML
10 INJECTION INTRAMUSCULAR; INTRAVENOUS EVERY 6 HOURS PRN
Status: DISCONTINUED | OUTPATIENT
Start: 2018-05-04 | End: 2018-05-07

## 2018-05-04 RX ORDER — METOCLOPRAMIDE HYDROCHLORIDE 5 MG/ML
10 INJECTION INTRAMUSCULAR; INTRAVENOUS AS NEEDED
Status: DISCONTINUED | OUTPATIENT
Start: 2018-05-04 | End: 2018-05-04 | Stop reason: HOSPADM

## 2018-05-04 RX ORDER — HYDROMORPHONE HYDROCHLORIDE 1 MG/ML
0.8 INJECTION, SOLUTION INTRAMUSCULAR; INTRAVENOUS; SUBCUTANEOUS EVERY 2 HOUR PRN
Status: DISCONTINUED | OUTPATIENT
Start: 2018-05-04 | End: 2018-05-07

## 2018-05-04 RX ORDER — DOCUSATE SODIUM 100 MG/1
100 CAPSULE, LIQUID FILLED ORAL 2 TIMES DAILY
Status: DISCONTINUED | OUTPATIENT
Start: 2018-05-04 | End: 2018-05-07

## 2018-05-04 RX ORDER — POLYETHYLENE GLYCOL 3350 17 G/17G
17 POWDER, FOR SOLUTION ORAL DAILY PRN
Status: DISCONTINUED | OUTPATIENT
Start: 2018-05-04 | End: 2018-05-07

## 2018-05-04 RX ORDER — ONDANSETRON 2 MG/ML
4 INJECTION INTRAMUSCULAR; INTRAVENOUS EVERY 4 HOURS PRN
Status: ACTIVE | OUTPATIENT
Start: 2018-05-04 | End: 2018-05-05

## 2018-05-04 RX ORDER — HYDROMORPHONE HYDROCHLORIDE 1 MG/ML
0.4 INJECTION, SOLUTION INTRAMUSCULAR; INTRAVENOUS; SUBCUTANEOUS EVERY 2 HOUR PRN
Status: DISCONTINUED | OUTPATIENT
Start: 2018-05-04 | End: 2018-05-07

## 2018-05-04 RX ORDER — SODIUM CHLORIDE, SODIUM LACTATE, POTASSIUM CHLORIDE, CALCIUM CHLORIDE 600; 310; 30; 20 MG/100ML; MG/100ML; MG/100ML; MG/100ML
INJECTION, SOLUTION INTRAVENOUS CONTINUOUS
Status: DISCONTINUED | OUTPATIENT
Start: 2018-05-04 | End: 2018-05-05

## 2018-05-04 RX ORDER — SODIUM CHLORIDE 9 MG/ML
INJECTION, SOLUTION INTRAVENOUS CONTINUOUS
Status: DISCONTINUED | OUTPATIENT
Start: 2018-05-04 | End: 2018-05-07

## 2018-05-04 RX ORDER — ACETAMINOPHEN 500 MG
1000 TABLET ORAL ONCE
COMMUNITY
End: 2018-07-30 | Stop reason: ALTCHOICE

## 2018-05-04 RX ORDER — PAROXETINE HYDROCHLORIDE 20 MG/1
20 TABLET, FILM COATED ORAL EVERY MORNING
Status: DISCONTINUED | OUTPATIENT
Start: 2018-05-04 | End: 2018-05-07

## 2018-05-04 RX ORDER — TRIAMTERENE AND HYDROCHLOROTHIAZIDE 37.5; 25 MG/1; MG/1
1 CAPSULE ORAL EVERY MORNING
Status: DISCONTINUED | OUTPATIENT
Start: 2018-05-05 | End: 2018-05-06

## 2018-05-04 RX ORDER — NALOXONE HYDROCHLORIDE 0.4 MG/ML
80 INJECTION, SOLUTION INTRAMUSCULAR; INTRAVENOUS; SUBCUTANEOUS AS NEEDED
Status: DISCONTINUED | OUTPATIENT
Start: 2018-05-04 | End: 2018-05-04 | Stop reason: HOSPADM

## 2018-05-04 RX ORDER — CLINDAMYCIN PHOSPHATE 600 MG/50ML
600 INJECTION INTRAVENOUS EVERY 8 HOURS
Status: DISCONTINUED | OUTPATIENT
Start: 2018-05-04 | End: 2018-05-04

## 2018-05-04 RX ORDER — BUPROPION HYDROCHLORIDE 150 MG/1
150 TABLET, EXTENDED RELEASE ORAL DAILY
Status: DISCONTINUED | OUTPATIENT
Start: 2018-05-04 | End: 2018-05-07

## 2018-05-04 RX ORDER — BACITRACIN 50000 [USP'U]/1
INJECTION, POWDER, LYOPHILIZED, FOR SOLUTION INTRAMUSCULAR AS NEEDED
Status: DISCONTINUED | OUTPATIENT
Start: 2018-05-04 | End: 2018-05-04 | Stop reason: HOSPADM

## 2018-05-04 RX ORDER — DIPHENHYDRAMINE HYDROCHLORIDE 50 MG/ML
25 INJECTION INTRAMUSCULAR; INTRAVENOUS EVERY 4 HOURS PRN
Status: DISCONTINUED | OUTPATIENT
Start: 2018-05-04 | End: 2018-05-07

## 2018-05-04 RX ORDER — BUPIVACAINE HYDROCHLORIDE AND EPINEPHRINE 5; 5 MG/ML; UG/ML
INJECTION, SOLUTION EPIDURAL; INTRACAUDAL; PERINEURAL AS NEEDED
Status: DISCONTINUED | OUTPATIENT
Start: 2018-05-04 | End: 2018-05-04 | Stop reason: HOSPADM

## 2018-05-04 NOTE — CONSULTS
[facility]  [address] [facility] Patient Status:  Inpatient    1965 MRN [id number]   [facility] 3SW-A Attending Tanna Walker MD   Hosp Day # 0 PCP Yissel St MD     Reason for consult: Medical management    Re Grandmother    • Breast Cancer Maternal Aunt [de-identified]   • Breast Cancer Maternal Cousin Female 27     In her 29's.        Allergies:   Ceclor                  HIVES  Lexapro [Escitalopr*    RASH    Medications:    No current facility-administered medications on f ALB, NA, K, CL, CO2, ALKPHO, AST, ALT, BILT, TP in the last 72 hours. No results for input(s): PTP, INR in the last 72 hours. No results for input(s): TROP, CK in the last 72 hours. Imaging: Imaging data reviewed in Epic.       ASSESSMENT / PLAN:

## 2018-05-04 NOTE — BRIEF OP NOTE
Pre-Operative Diagnosis: Chronic bilateral low back pain with left-sided sciatica [M54.42, G89.29]     Post-Operative Diagnosis: Chronic bilateral low back pain with left-sided sciatica [M54.42, G89.29]      Procedure Performed:   Procedure(s):  lumbar fiv

## 2018-05-04 NOTE — H&P
[employer]  Neurosurgery H&P    HISTORY OF PRESENT ILLNESS:  Shaquille Perera is a(n) 50year old female s/p prior L5-S1 decompression and fusion with persistent low back and LLE pain.   Her persistent pain is consistent with likely pseud (90 Base) MCG/ACT Inhalation Aero Soln Inhale 2 puffs into the lungs every 4 (four) hours as needed for Wheezing. Disp:  Rfl:     azithromycin 500 MG Oral Tab Take 500 mg by mouth daily.  Disp:  Rfl:     PARoxetine HCl (PAXIL) 20 MG Oral Tab Take 1 tablet ( [address] [facility], [address]  [address] [facility], [address] [facility]

## 2018-05-05 ENCOUNTER — PRIOR ORIGINAL RECORDS (OUTPATIENT)
Dept: OTHER | Age: 53
End: 2018-05-05

## 2018-05-05 PROCEDURE — 99232 SBSQ HOSP IP/OBS MODERATE 35: CPT | Performed by: HOSPITALIST

## 2018-05-05 NOTE — RESPIRATORY THERAPY NOTE
JIMMY - Equipment Use Daily Summary:  · Set Mode cflex  · Usage in hours: 2:18  · 90% Pressure (EPAP) level: 13.5  · 90% Insp Pressure (IPAP):   · AHI: 8.3  · Supplemental Oxygen:   · Comments:

## 2018-05-05 NOTE — PHYSICAL THERAPY NOTE
PHYSICAL THERAPY QUICK EVALUATION - INPATIENT    Room Number: 359/359-A  Evaluation Date: 5/5/2018  Presenting Problem: left radiculopathy s/p L5-S1 revision decompression and fusion with extension to L4 (5/4/18)  Physician Order: PT Eval and Treat     P good.\"    OBJECTIVE  Precautions: Spine;Drain(s)  Fall Risk: Standard fall risk    WEIGHT BEARING RESTRICTION  Weight Bearing Restriction: None                PAIN ASSESSMENT  Ratin  Location: low back (denies radicular symptoms)  Management Technique tolerates ambulation x 300 feet with use of rolling walker for initial 100 feet and then no assistive device for remaining 200 feet; pt presents with out significant gait deviation; pt with UE in low guard, verbal cues to relax UE with fair carryover; no o

## 2018-05-05 NOTE — PROGRESS NOTES
[facility] Neurosurgery Progress Note    Pretty Merida Patient Status:  Inpatient    1965 MRN [id number]   [facility] 3SW-A Attending Roberto Carlos Ho MD   Hosp Day # 1 PCP German Leo MD     Subjective:  Christina Gilmore Bela [phone]  Pager [phone]  5/5/2018, 8:45 AM

## 2018-05-05 NOTE — OCCUPATIONAL THERAPY NOTE
OCCUPATIONAL THERAPY QUICK EVALUATION - INPATIENT    Room Number: 359/359-A  Evaluation Date: 5/5/2018     Type of Evaluation: Initial  Presenting Problem: L5-S1 revision, decompression, fusion & extension L4 5/4/18    Physician Order: IP Consult to YULISSA Taveras Regularly Uses: Glasses    Prior Level of Function: Pt was ind in all ADL/IADL w/o AD AE. SUBJECTIVE   \"I have friends who can stay with me. \"    Patient self-stated goal is not needing additional surgery    OBJECTIVE  Precautions: Spine;Drain(s); Other pericare, education on toileting aid), grooming (education on incorporation of spinal precautions into oral/facial hygiene), UB dress (education on technique), LB Dress (donned pants cross leg seated no AE with mod ind, can don shoes/socks cross leg at mod Overall Complexity  LOW     OT Discharge Recommendations: Home (assist with IADLs as needed)  OT Device Recommendations: Reacher;Long-handled sponge; Toileting aid    PLAN   Patient has been evaluated and presents with no skilled Occupational Therapy need

## 2018-05-05 NOTE — OPERATIVE REPORT
[facility]    OPERATIVE REPORT    Patient:  Lyla Torres;  YOB: 1965     CSN:  [id number]; Medical Record Number:  [id number]    Admission Date:  5/4/2018 Operation Date:  5/4/2018    . ..........................     Operating Physician palpated and found to be patent bilaterally.      The pedicle entry points were dissected out. The patient's prior instrumentation at L5 and S1 was removed including set screws, rods and pedicle screws.  The pedicle screw tracts were palpated and found to h [id number]

## 2018-05-05 NOTE — PROGRESS NOTES
[facility]  Progress Note     Tony Merida Patient Status:  Inpatient    1965 MRN [id number]   [facility] 3SW-A Attending Margie Alves MD   Hosp Day # 1 PCP Humera Rose MD     Chief Complaint: Medical management    S fusion  5. Depression    Plan of care: As above    Quality:  · DVT Prophylaxis: SCD's  · CODE status: Full Code  · Constantino: Present  · Central line: None    Estimated date of discharge: 1-2 days  Discharge is dependent on: Progress  At this point Ms. Davon Rodríguez

## 2018-05-05 NOTE — PLAN OF CARE
PAIN - ADULT    • Verbalizes/displays adequate comfort level or patient's stated pain goal Progressing        Patient/Family Goals    • Patient/Family Short Term Goal Progressing        SAFETY ADULT - FALL    • Free from fall injury Progressing        Kavita

## 2018-05-06 ENCOUNTER — APPOINTMENT (OUTPATIENT)
Dept: GENERAL RADIOLOGY | Facility: HOSPITAL | Age: 53
DRG: 460 | End: 2018-05-06
Attending: NEUROLOGICAL SURGERY
Payer: COMMERCIAL

## 2018-05-06 PROCEDURE — 99232 SBSQ HOSP IP/OBS MODERATE 35: CPT | Performed by: HOSPITALIST

## 2018-05-06 PROCEDURE — 72100 X-RAY EXAM L-S SPINE 2/3 VWS: CPT | Performed by: NEUROLOGICAL SURGERY

## 2018-05-06 RX ORDER — POTASSIUM CHLORIDE 20 MEQ/1
40 TABLET, EXTENDED RELEASE ORAL EVERY 4 HOURS
Status: COMPLETED | OUTPATIENT
Start: 2018-05-06 | End: 2018-05-06

## 2018-05-06 RX ORDER — HEPARIN SODIUM 5000 [USP'U]/ML
5000 INJECTION, SOLUTION INTRAVENOUS; SUBCUTANEOUS EVERY 8 HOURS SCHEDULED
Status: DISCONTINUED | OUTPATIENT
Start: 2018-05-06 | End: 2018-05-07

## 2018-05-06 NOTE — PROGRESS NOTES
[facility] Neurosurgery Progress Note    Mirna Merida Patient Status:  Inpatient    1965 MRN [id number]   [facility] 3SW-A Attending Arcelia Matthew MD   Hosp Day # 2 PCP Hui Dumont MD       Subjective:  Flor Rinaldi [address]  5/6/2018, 11:13 AM

## 2018-05-06 NOTE — PROGRESS NOTES
[facility]  Progress Note      Shoshana Merida Patient Status:  Inpatient    1965 MRN [id number]   [facility] 3SW-A Attending Conner Caballero MD   Hosp Day # 2 PCP Paul Green MD     Chief Complaint: Medical management    S • docusate sodium  100 mg Oral BID   • clindamycin  600 mg Intravenous Q8H       ASSESSMENT / PLAN:     1. HTN:  Will DC triamterene/hctz (held today) due to normotension. 2. HL  3. JIMMY:  CPAP  4. S/p fusion  5.  Depression    Plan of care: As above    Q

## 2018-05-06 NOTE — PROGRESS NOTES
05/05/18 3535   Clinical Encounter Type   Visited With Patient   Routine Visit Introduction  (AD forms lwft with the pt, with instructions.   Pt will return to the RN when completed)   Continue Visiting No   Patient's Supportive Strategies/Resources Conchis Gaona

## 2018-05-06 NOTE — RESPIRATORY THERAPY NOTE
JIMMY - Equipment Use Daily Summary:  · Set Mode CFLEX  · Usage in hours: 1:19  · 90% Pressure (EPAP) level:   · 90% Insp Pressure (IPAP): 17  · AHI: 2  · Supplemental Oxygen:  · Comments:

## 2018-05-07 ENCOUNTER — PRIOR ORIGINAL RECORDS (OUTPATIENT)
Dept: OTHER | Age: 53
End: 2018-05-07

## 2018-05-07 VITALS
BODY MASS INDEX: 37.37 KG/M2 | WEIGHT: 238.13 LBS | RESPIRATION RATE: 17 BRPM | HEIGHT: 67 IN | HEART RATE: 82 BPM | SYSTOLIC BLOOD PRESSURE: 95 MMHG | OXYGEN SATURATION: 93 % | DIASTOLIC BLOOD PRESSURE: 68 MMHG | TEMPERATURE: 98 F

## 2018-05-07 PROBLEM — Z99.89 OSA ON CPAP: Status: ACTIVE | Noted: 2018-05-04

## 2018-05-07 PROBLEM — G47.33 OSA ON CPAP: Status: ACTIVE | Noted: 2018-05-04

## 2018-05-07 PROCEDURE — 99232 SBSQ HOSP IP/OBS MODERATE 35: CPT | Performed by: HOSPITALIST

## 2018-05-07 RX ORDER — HYDROCODONE BITARTRATE AND ACETAMINOPHEN 10; 325 MG/1; MG/1
1 TABLET ORAL EVERY 4 HOURS PRN
Qty: 60 TABLET | Refills: 0 | Status: SHIPPED | OUTPATIENT
Start: 2018-05-07 | End: 2018-10-23

## 2018-05-07 RX ORDER — CYCLOBENZAPRINE HCL 10 MG
10 TABLET ORAL 3 TIMES DAILY PRN
Qty: 60 TABLET | Refills: 0 | Status: SHIPPED | OUTPATIENT
Start: 2018-05-07 | End: 2018-05-17

## 2018-05-07 NOTE — RESPIRATORY THERAPY NOTE
JIMMY : EQUIPMENT USE: DAILY SUMMARY                                            SET MODE: AUTO CPAP WITH CFLEX                                          USAGE IN HOURS: 14:57                                          9

## 2018-05-07 NOTE — PROGRESS NOTES
[facility] Neurosurgery Progress Note    Delilah Merida Patient Status:  Inpatient    1965 MRN [id number]   [facility] 3SW-A Attending Duncan Brand MD   Hosp Day # 3 PCP Stan Jalloh MD       Subjective:  Georgia Gilmore

## 2018-05-07 NOTE — DISCHARGE SUMMARY
[facility]  Discharge Summary    Fredy Hernandez Patient Status:  Inpatient    1965 MRN [id number]   [facility] 3SW-A Attending No att. providers found   [facility] Day # 3 PCP Kirill Morales MD     Date of Admission: 2018    Skyler by mouth 3 (three) times daily as needed for Muscle spasms. , Print Script, Disp-60 tablet, R-0      CONTINUE these medications which have NOT CHANGED    acetaminophen 500 MG Oral Tab  Take 1,000 mg by mouth one time., Historical    Albuterol Sulfate HFA 10

## 2018-05-07 NOTE — PROGRESS NOTES
[facility]  Progress Note     Marrogelio Merida Patient Status:  Inpatient    1965 MRN [id number]   [facility] 3SW-A Attending Sebas Johnson MD   Hosp Day # 3 PCP Arin Galeas MD     Chief Complaint: Medical management    S cetirizine  10 mg Oral Daily   • Senna  17.2 mg Oral Nightly   • docusate sodium  100 mg Oral BID   • clindamycin  600 mg Intravenous Q8H       ASSESSMENT / PLAN:     1. HTN:  Hold BP meds until recheck with pcp in one week. 2. HL  3. JIMMY:  CPAP  4.  S/p f

## 2018-05-07 NOTE — PLAN OF CARE
Pt ready to discharge home today. Drains d/c'd this morning. Verbal and written discharge orders reviewed with patient, verbalized understanding. Rx for norco and flexeril given.

## 2018-05-08 ENCOUNTER — TELEPHONE (OUTPATIENT)
Dept: SURGERY | Facility: CLINIC | Age: 53
End: 2018-05-08

## 2018-05-08 NOTE — TELEPHONE ENCOUNTER
Kendall Cooper RN from Transition of care management calling regarding pt's post procedure outreach call,   She states pt is running a low temp fever of 100. States she did inform pt to call  if temp was 101 or above.   She also states pt informed her she h

## 2018-05-21 ENCOUNTER — OFFICE VISIT (OUTPATIENT)
Dept: SURGERY | Facility: CLINIC | Age: 53
End: 2018-05-21

## 2018-05-21 ENCOUNTER — TELEPHONE (OUTPATIENT)
Dept: SURGERY | Facility: CLINIC | Age: 53
End: 2018-05-21

## 2018-05-21 VITALS — HEART RATE: 68 BPM | SYSTOLIC BLOOD PRESSURE: 136 MMHG | DIASTOLIC BLOOD PRESSURE: 82 MMHG

## 2018-05-21 DIAGNOSIS — Z98.1 S/P LUMBAR FUSION: ICD-10-CM

## 2018-05-21 DIAGNOSIS — M96.1 FAILED BACK SYNDROME, LUMBOSACRAL: Primary | ICD-10-CM

## 2018-05-21 PROCEDURE — 99024 POSTOP FOLLOW-UP VISIT: CPT | Performed by: PHYSICIAN ASSISTANT

## 2018-05-21 NOTE — PATIENT INSTRUCTIONS
Refill policies:    • Allow 2-3 business days for refills; controlled substances may take longer.   • Contact your pharmacy at least 5 days prior to running out of medication and have them send an electronic request or submit request through the “request re entire amount billed. Precertification and Prior Authorizations: If your physician has recommended that you have a procedure or additional testing performed.   [facility]) will contact your insurance carrier to obtain pre-certi

## 2018-05-21 NOTE — PROGRESS NOTES
Here for post op visit. Still having some pain across lower back, ribcage and left knee and ankle, the same as before surgery. Gets this especially when laying flat and then standing.

## 2018-05-21 NOTE — PROGRESS NOTES
Neurosurgery Clinic Visit  2018    Fredy Hernandez PCP:  Kirill Morales MD    1965 MRN [id number]       CC:  F/u L4-5-S1 redo fusion 18 Dr. Lissy Villafuerte    HPI:    Ewa Reyes is here for 2 week f/u s/p lumbar revision.   She is off norco.  She take mother.     SOCIAL HISTORY:   reports that she quit smoking about 3 weeks ago. She has a 15.00 pack-year smoking history. She has never used smokeless tobacco. She reports that she drinks alcohol.  She reports that she uses drugs, including Cannabis.     AL narcotics. Staples and drain suture removed and covered with dressing. May consider going back to work in 2 weeks with restrictions. Plan to start therapy 8 weeks after surgery. F/u in 6 weeks with repeat xrays.     EDUAR [facility]

## 2018-05-21 NOTE — TELEPHONE ENCOUNTER
Paperwork completed. Called patient who stated to fax paperwork to Human resources fax #: [phone]. Patient also asked for copy of form be mailed to her home address. Paperwork faxed. Copy placed in outgoing mail bin to patient.   Copy sent to pieter

## 2018-05-24 ENCOUNTER — HOSPITAL ENCOUNTER (OUTPATIENT)
Dept: MAMMOGRAPHY | Age: 53
Discharge: HOME | End: 2018-05-24
Attending: NURSE PRACTITIONER
Payer: COMMERCIAL

## 2018-05-24 DIAGNOSIS — Z12.31 VISIT FOR SCREENING MAMMOGRAM: ICD-10-CM

## 2018-05-24 PROCEDURE — 77063 BREAST TOMOSYNTHESIS BI: CPT | Performed by: NURSE PRACTITIONER

## 2018-05-24 PROCEDURE — 77067 SCR MAMMO BI INCL CAD: CPT | Performed by: NURSE PRACTITIONER

## 2018-05-29 ENCOUNTER — TELEPHONE (OUTPATIENT)
Dept: SURGERY | Facility: CLINIC | Age: 53
End: 2018-05-29

## 2018-05-29 NOTE — TELEPHONE ENCOUNTER
Spoke with patient who stated she is now getting numbness that comes and goes to her right leg. States she has never had this before. Patient also stated per her HR it would be best for patient to be off until she is re-evaluated.  They stated once patricia

## 2018-05-30 NOTE — TELEPHONE ENCOUNTER
Discussed symptoms with patient. Numbness limited to small area above right knee on the anterolateral aspect. Symptoms are intermittent, has not had since Saturday.  Does not fit a dermatomal pattern, not associated with increase in back or leg pain or wea

## 2018-05-31 ENCOUNTER — TELEPHONE (OUTPATIENT)
Dept: SURGERY | Facility: CLINIC | Age: 53
End: 2018-05-31

## 2018-05-31 NOTE — TELEPHONE ENCOUNTER
Pt spoke to provider on 5/29/18, was to be evaluated prior to return to work.    Request routed to provider for letter if appropriate

## 2018-06-01 NOTE — TELEPHONE ENCOUNTER
Letter faxed, fu needs to be moved to sooner date,  informed to call pt to reschedule.  Nothing further

## 2018-06-14 ENCOUNTER — HOSPITAL ENCOUNTER (OUTPATIENT)
Dept: GENERAL RADIOLOGY | Age: 53
Discharge: HOME | End: 2018-06-14
Attending: PHYSICIAN ASSISTANT
Payer: COMMERCIAL

## 2018-06-14 DIAGNOSIS — Z98.1 S/P LUMBAR FUSION: ICD-10-CM

## 2018-06-14 DIAGNOSIS — M96.1 FAILED BACK SYNDROME, LUMBOSACRAL: ICD-10-CM

## 2018-06-14 PROCEDURE — 72100 X-RAY EXAM L-S SPINE 2/3 VWS: CPT | Performed by: PHYSICIAN ASSISTANT

## 2018-06-18 ENCOUNTER — OFFICE VISIT (OUTPATIENT)
Dept: SURGERY | Facility: CLINIC | Age: 53
End: 2018-06-18

## 2018-06-18 VITALS — SYSTOLIC BLOOD PRESSURE: 114 MMHG | DIASTOLIC BLOOD PRESSURE: 78 MMHG | HEART RATE: 76 BPM | RESPIRATION RATE: 18 BRPM

## 2018-06-18 DIAGNOSIS — M96.1 FAILED BACK SYNDROME, LUMBOSACRAL: Primary | ICD-10-CM

## 2018-06-18 DIAGNOSIS — M54.16 LUMBAR RADICULOPATHY: ICD-10-CM

## 2018-06-18 PROCEDURE — 99024 POSTOP FOLLOW-UP VISIT: CPT | Performed by: PHYSICIAN ASSISTANT

## 2018-06-18 RX ORDER — GABAPENTIN 300 MG/1
CAPSULE ORAL
Qty: 60 CAPSULE | Refills: 0 | Status: SHIPPED | OUTPATIENT
Start: 2018-06-18 | End: 2018-06-26

## 2018-06-18 RX ORDER — TRIAMTERENE AND HYDROCHLOROTHIAZIDE 37.5; 25 MG/1; MG/1
1 CAPSULE ORAL DAILY
Refills: 0 | COMMUNITY
Start: 2018-06-12

## 2018-06-18 NOTE — PROGRESS NOTES
Patient completed f/u xray on 6/14/18. Still having pain in back down left leg. Standing is pressure heavy feeling from mid to lower back. Numbness to the right thigh on and off still, but new since surgery.

## 2018-06-18 NOTE — PROGRESS NOTES
Neurosurgery Clinic Visit  2018    Marina Matson PCP:  Alyson Varela MD    1965 MRN [id number]       CC:  F/u L4-5-S1 redo fusion 18 Dr. Vidya Mon    HPI:    Julius Ruvalcaba is here for 6 week post op appt.   She is still experiencing the same pre              DISSECTIONN/AGeneral- Dr. Contreras  1:   No date: CHOLECYSTECTOMY  No date: EXCIS LUMBAR DISK,ONE LEVEL  No date: REMOVAL GALLBLADDER  No date: REMOVAL OF TONSILS,12+ Y/O  No date: TONSILLECTOMY     FAMILY HISTORY:  family history in mild-moderate canal > foraminal stenosis  CT lumbar with instrumentation in appropriate position, stable spondylolisthesis, incomplete bony fusion     XR Lumbar 6/14/18 - good instrumentation L4-5-S1 without evidence of failure, stable     A/P:     1.  Fail

## 2018-06-18 NOTE — PATIENT INSTRUCTIONS
Refill policies:    • Allow 2-3 business days for refills; controlled substances may take longer.   • Contact your pharmacy at least 5 days prior to running out of medication and have them send an electronic request or submit request through the “request re entire amount billed. Precertification and Prior Authorizations: If your physician has recommended that you have a procedure or additional testing performed.   [facility]) will contact your insurance carrier to obtain pre-certi

## 2018-06-19 ENCOUNTER — TELEPHONE (OUTPATIENT)
Dept: SURGERY | Facility: CLINIC | Age: 53
End: 2018-06-19

## 2018-06-19 DIAGNOSIS — M96.1 FAILED BACK SYNDROME, LUMBOSACRAL: Primary | ICD-10-CM

## 2018-06-26 ENCOUNTER — TELEPHONE (OUTPATIENT)
Dept: SURGERY | Facility: CLINIC | Age: 53
End: 2018-06-26

## 2018-06-26 NOTE — TELEPHONE ENCOUNTER
Patient states she has reacted to Gabapentin, skin has turned red like a \"sunburn\". She called her pharmacy and was instructed to stop taking the medication. I agree that she should discontinue medication.  Will speak with CHRISS Nichols to see if he wants to

## 2018-06-27 RX ORDER — PREGABALIN 50 MG/1
CAPSULE ORAL
Qty: 60 CAPSULE | Refills: 0 | Status: SHIPPED | OUTPATIENT
Start: 2018-06-27 | End: 2018-07-19

## 2018-06-27 NOTE — TELEPHONE ENCOUNTER
LM informing patient of below information and to call our office back if she has any reactions or any additional questions.

## 2018-07-12 ENCOUNTER — OFFICE VISIT (OUTPATIENT)
Dept: PHYSICAL THERAPY | Age: 53
End: 2018-07-12
Attending: PHYSICIAN ASSISTANT
Payer: COMMERCIAL

## 2018-07-12 DIAGNOSIS — M96.1 FAILED BACK SYNDROME, LUMBOSACRAL: ICD-10-CM

## 2018-07-12 DIAGNOSIS — M54.16 LUMBAR RADICULOPATHY: ICD-10-CM

## 2018-07-12 PROCEDURE — 97110 THERAPEUTIC EXERCISES: CPT

## 2018-07-12 PROCEDURE — 97163 PT EVAL HIGH COMPLEX 45 MIN: CPT

## 2018-07-12 NOTE — PROGRESS NOTES
SPINE EVALUATION:   Referring Physician: Marja Alpers PA  Diagnosis:  s/p L5-S1 revision decompression and fusion with extension to L4 (5/4/18)      Date of Service: 7/12/2018     PATIENT SUMMARY   Gaudencio Mays is a 48year old female who presents to  changes. Pt emotional, upset with continuation of symptoms. Neuro Screen: Neural tension tests negative, DTR's at LE's intact.     Lumbar spine AROM:   Flexion: WFL  Extension: WFL  Sidebending: R WFL; L WFL  Rotation: R WFL; L WFL  No pain complaints wi education; Home exercise program instruction. Education or treatment limitation: chronicity of symptoms w/o improvement from therapies and surgeries.     Rehab Potential:fair    FOTO: 46/100    Patient/Family/Caregiver was advised of these findings, prec

## 2018-07-26 ENCOUNTER — OFFICE VISIT (OUTPATIENT)
Dept: PHYSICAL THERAPY | Age: 53
End: 2018-07-26
Attending: PHYSICIAN ASSISTANT
Payer: COMMERCIAL

## 2018-07-26 PROCEDURE — 97110 THERAPEUTIC EXERCISES: CPT

## 2018-07-26 PROCEDURE — 97112 NEUROMUSCULAR REEDUCATION: CPT

## 2018-07-26 NOTE — PROGRESS NOTES
Dx:     s/p L5-S1 revision decompression and fusion with extension to L4 (5/4/18)        Fall Risk: standard         Precautions:surgical             Subjective: Reports performing HEP. L LE symptoms same.   Has sporadic pain lateral leg to foot, posterior food/meals.   -Improve abdominal strength to WFL's so she can decrease anterior pelvic tilt/stress to lumbar spine so she can walk in the community with little to no back leg/hip pain, no difficulty.     -Improve body mechanics/posture awareness so pt can s

## 2018-07-30 ENCOUNTER — OFFICE VISIT (OUTPATIENT)
Dept: SURGERY | Facility: CLINIC | Age: 53
End: 2018-07-30

## 2018-07-30 ENCOUNTER — OFFICE VISIT (OUTPATIENT)
Dept: PHYSICAL THERAPY | Age: 53
End: 2018-07-30
Attending: PHYSICIAN ASSISTANT
Payer: COMMERCIAL

## 2018-07-30 VITALS — DIASTOLIC BLOOD PRESSURE: 86 MMHG | HEART RATE: 76 BPM | SYSTOLIC BLOOD PRESSURE: 118 MMHG

## 2018-07-30 DIAGNOSIS — G89.29 CHRONIC LEFT-SIDED LOW BACK PAIN WITH LEFT-SIDED SCIATICA: Primary | ICD-10-CM

## 2018-07-30 DIAGNOSIS — M54.42 CHRONIC LEFT-SIDED LOW BACK PAIN WITH LEFT-SIDED SCIATICA: Primary | ICD-10-CM

## 2018-07-30 PROCEDURE — 99024 POSTOP FOLLOW-UP VISIT: CPT | Performed by: NEUROLOGICAL SURGERY

## 2018-07-30 PROCEDURE — 97110 THERAPEUTIC EXERCISES: CPT

## 2018-07-30 PROCEDURE — 97112 NEUROMUSCULAR REEDUCATION: CPT

## 2018-07-30 RX ORDER — TRAMADOL HYDROCHLORIDE 50 MG/1
50 TABLET ORAL EVERY 8 HOURS PRN
Qty: 90 TABLET | Refills: 0 | Status: SHIPPED | OUTPATIENT
Start: 2018-07-30 | End: 2019-06-07 | Stop reason: ALTCHOICE

## 2018-07-30 NOTE — PROGRESS NOTES
Patient reports no change in her pain issues since last OV. Started PT about 1 week ago. Patient has increased \"redness and itching\" to only her arms. Therapist told her to speak with Dr. Aj Valentin.   Patient would like to try Gabapentin again, does not fee

## 2018-07-30 NOTE — PROGRESS NOTES
[facility]  Neurosurgery Clinic Visit    HISTORY OF PRESENT ILLNESS:  Pineda Navarrete is a(n) 48year old female s/p 5/4/18 L4-S1 revision of fusion, limited decompression.   Has persistent back and left leg pain that is the same in clemente HISTORY:   reports that she quit smoking about 4 months ago. She has a 15.00 pack-year smoking history. She has never used smokeless tobacco. She reports that she uses drugs, including Cannabis. She reports that she does not drink alcohol.     ALLERGIES:

## 2018-07-30 NOTE — PATIENT INSTRUCTIONS
Refill policies:    • Allow 2-3 business days for refills; controlled substances may take longer.   • Contact your pharmacy at least 5 days prior to running out of medication and have them send an electronic request or submit request through the “request re entire amount billed. Precertification and Prior Authorizations: If your physician has recommended that you have a procedure or additional testing performed.   [facility]) will contact your insurance carrier to obtain pre-certi

## 2018-07-30 NOTE — PROGRESS NOTES
Dx:     s/p L5-S1 revision decompression and fusion with extension to L4 (5/4/18)        Fall Risk: standard         Precautions:surgical             Subjective: Reports performing HEP. L LE symptoms same.   Has sporadic pain lateral leg to foot, posterior Assessment:  No significant change in symptoms since last seen. To begin meds again, assess results. Lateral leg complaints quickly abolished with self neural mobe, did not return throughout tx.   Program advanced as noted above t

## 2018-08-01 ENCOUNTER — OFFICE VISIT (OUTPATIENT)
Dept: PHYSICAL THERAPY | Age: 53
End: 2018-08-01
Attending: PHYSICIAN ASSISTANT
Payer: COMMERCIAL

## 2018-08-01 PROCEDURE — 97110 THERAPEUTIC EXERCISES: CPT

## 2018-08-01 PROCEDURE — 97112 NEUROMUSCULAR REEDUCATION: CPT

## 2018-08-01 NOTE — PROGRESS NOTES
Dx:     s/p L5-S1 revision decompression and fusion with extension to L4 (5/4/18)        Fall Risk: standard         Precautions:surgical             Subjective:  Has had some LE symptoms, vague, no pain. Little back pain.  Is taking prescribed meds as ord ab brace with B bridge 10 reps  Progress to 5 sec holds  X 10 2 sets  -v/c and t/c to maintain ab brace  10 min       NM re ed:  Hook lying: ab draw in maneuver, progress to 10 sec holds, 5-10 rep sets  -add EP  10 min There ex:   Trunk flexion in sitting, progressive HEP. Plan: check HEP, symptoms, progress program accordingly. Pt may purchase BP cuff to use for feedback with draw in maneuver, bring to next tx.     Charges: there x 1 NM re ed 3     Total Timed Treatment:  45 min  Total Treatment Time:

## 2018-08-06 ENCOUNTER — OFFICE VISIT (OUTPATIENT)
Dept: PHYSICAL THERAPY | Age: 53
End: 2018-08-06
Attending: PHYSICIAN ASSISTANT
Payer: COMMERCIAL

## 2018-08-06 PROCEDURE — 97112 NEUROMUSCULAR REEDUCATION: CPT

## 2018-08-06 NOTE — PROGRESS NOTES
Dx:     s/p L5-S1 revision decompression and fusion with extension to L4 (5/4/18)        Fall Risk: standard         Precautions:surgical             Subjective: Has noticed decreased L leg symptoms. Less intensity and frequency.  Has ordered BP cuff for a knee lifts each side  15 min As above with:    -add bent knee fallouts  -add bent knee lifts each side  -add SLR each side  15 min      As above with hands on 8 inch platform, come to standing, full spine flexion and knee extension  Hold 15 sec x 5 reps  - abdominal strength to WFL's so she can decrease anterior pelvic tilt/stress to lumbar spine so she can walk in the community with little to no back leg/hip pain, no difficulty.     -Improve body mechanics/posture awareness so pt can self correct sleeping po

## 2018-08-09 ENCOUNTER — OFFICE VISIT (OUTPATIENT)
Dept: PHYSICAL THERAPY | Age: 53
End: 2018-08-09
Attending: PHYSICIAN ASSISTANT
Payer: COMMERCIAL

## 2018-08-09 PROCEDURE — 97110 THERAPEUTIC EXERCISES: CPT

## 2018-08-09 PROCEDURE — 97112 NEUROMUSCULAR REEDUCATION: CPT

## 2018-08-09 NOTE — PROGRESS NOTES
Dx:     s/p L5-S1 revision decompression and fusion with extension to L4 (5/4/18)        Fall Risk: standard         Precautions:surgical             Subjective: Only L leg symptoms noted today was posterior pain at times with sitting and driving.  Hemant kim fallouts  - bent knee lifts each side  Progress HEP  15 min     There ex: (10 min)  Trunk flexion in sitting, hold 15 sec x 3  -add HEP NM re ed:  4 pt kneel with cat/camel self lumbar spine and neural mobe  X 10 2 sets  8 min NM re ed:  Hook lying: ab Valentino Section sitting,hands on 8 inch platform, come to standing, full spine flexion and knee extension  Hold 15 sec x 5 reps   5 min        NM re ed:  Single leg stance with contra UE lat pullback 7.5 wt x 15 2 sets each side                   Assessment:  Continues to

## 2018-08-13 ENCOUNTER — OFFICE VISIT (OUTPATIENT)
Dept: PHYSICAL THERAPY | Age: 53
End: 2018-08-13
Attending: PHYSICIAN ASSISTANT
Payer: COMMERCIAL

## 2018-08-13 PROCEDURE — 97110 THERAPEUTIC EXERCISES: CPT

## 2018-08-13 PROCEDURE — 97112 NEUROMUSCULAR REEDUCATION: CPT

## 2018-08-13 NOTE — PROGRESS NOTES
Dx:     s/p L5-S1 revision decompression and fusion with extension to L4 (5/4/18)        Fall Risk: standard         Precautions:surgical             Subjective: Reports good HEP compliancy. Is able to sleep most night w/o interruption from pain.   Leg symp cuff, baseline 40 mm/hg, progress to 46 mm/hg,10 sec holds   10 min NM re ed: Use BP cuff for  feedback, baseline 40 mm/hg, progress to 50 mm/hg,10-15 sec holds with:  - bent knee fallouts  - bent knee lifts each side  Progress HEP  15 min NM re ed:   Use draw in ,  cable machine 7.5 wt x 15 each side  (maintain neutral spine)  8 min NM re ed:  Prone pillow under hips, ab brace with alt hip ext  Hold 3 sec x 10 each 2 sets NM re ed:  Cable machine:  L and R trunk rotation with 7.5 wt x 20 each side NM re ed mechanics/posture awareness so pt can self correct sleeping positioning so she can sleep w/o interruption from pain 4 of 7 nights a week.   -Improve limbo pelvic strength and flexibility  to WFL's so pt can perform light house chores with little pain or dif

## 2018-08-16 ENCOUNTER — OFFICE VISIT (OUTPATIENT)
Dept: PHYSICAL THERAPY | Age: 53
End: 2018-08-16
Attending: PHYSICIAN ASSISTANT
Payer: COMMERCIAL

## 2018-08-16 PROCEDURE — 97110 THERAPEUTIC EXERCISES: CPT

## 2018-08-16 PROCEDURE — 97112 NEUROMUSCULAR REEDUCATION: CPT

## 2018-08-16 NOTE — PROGRESS NOTES
Dx:     s/p L5-S1 revision decompression and fusion with extension to L4 (5/4/18)        Fall Risk: standard         Precautions:surgical              Subjective: Reports has had return of anterior hip pain, L>R and some increase in L lateral lower leg tin fallouts  -add bent knee lifts each side  -progress HEP  20 min NM re ed:  4 pt kneel with cat/camel self lumbar spine and neural mobe  X 10 2 sets  5 min NM re ed:  Use biofeedback pressure cuff, baseline 40 mm/hg, progress to 46 mm/hg,10 sec holds   10 m ext  Hold 3 sec x 10 each 2 sets NM re ed:   Ab brace with bridge in hook lying  Hold 10 sec x 10 reps   NM re ed:  Hook lying: ab draw in maneuver, progress to 10 sec holds, 5-10 rep sets  -add EP  10 min There ex:   Trunk flexion in sitting,hands on 8 inc along with abdominal ex progressed in supine using feedback cuff (pt using BP cuff at home). HO issued for additional HEP.          .  Goals:   -Improve hip flex length to WNL's to decrease compression on lumbar spine, pt able to maintain proper posture wi

## 2018-08-22 ENCOUNTER — OFFICE VISIT (OUTPATIENT)
Dept: PHYSICAL THERAPY | Age: 53
End: 2018-08-22
Attending: PHYSICIAN ASSISTANT
Payer: COMMERCIAL

## 2018-08-22 PROCEDURE — 97110 THERAPEUTIC EXERCISES: CPT

## 2018-08-22 PROCEDURE — 97112 NEUROMUSCULAR REEDUCATION: CPT

## 2018-08-22 NOTE — PROGRESS NOTES
Dx:     s/p L5-S1 revision decompression and fusion with extension to L4 (5/4/18)        Fall Risk: standard         Precautions:surgical              Subjective: Little to no symptoms since seen. No rgular exercise routine or walks yet.   Therapy HEP spor lying: ab draw in maneuver, progress to 10 sec holds, 5-10 rep sets  -add bent knee fallouts  -add bent knee lifts each side  -progress HEP  20 min NM re ed:  4 pt kneel with cat/camel self lumbar spine and neural mobe  X 10 2 sets  5 min NM re ed:  Use bi and t/c to maintain ab brace  10 min NM re ed:   Ab brace with bridge in hook lying  Hold 5-10 sec x 10 reps NM re ed:  Prone pillow under hips, ab brace with bent knee hip ext  Hold 3 sec x 10 each 2 sets  -add HEP NM re ed:  Prone pillow under hips, ab br on 6 inch platform, come to standing, full spine flexion and knee extension  -progress to finger tips to floor  Hold 15 sec x 5 reps   8 min There ex:   Trunk flexion in sitting,hands on 6 inch platform, come to standing, full spine flexion and knee extens

## 2018-08-27 ENCOUNTER — OFFICE VISIT (OUTPATIENT)
Dept: PHYSICAL THERAPY | Age: 53
End: 2018-08-27
Attending: PHYSICIAN ASSISTANT
Payer: COMMERCIAL

## 2018-08-27 PROCEDURE — 97110 THERAPEUTIC EXERCISES: CPT

## 2018-08-27 PROCEDURE — 97112 NEUROMUSCULAR REEDUCATION: CPT

## 2018-08-27 NOTE — PROGRESS NOTES
Dx:     s/p L5-S1 revision decompression and fusion with extension to L4 (5/4/18)        Fall Risk: standard         Precautions:surgical              Subjective: Has increased activity with yard work, short walks (back ache), and has signed up for yoga cl hip flex stretch, 60 sec bouts  6 min total   There ex:  L and R 1 jt hip flex stretch, 60 sec bouts  Prone bent knee hip ext x 10 each  10 min total NM re ed:  Hook lying: ab draw in maneuver, progress to 10 sec holds, 5-10 rep sets  -add bent knee fallou inch platform, come to standing, full spine flexion and knee extension  Hold 15 sec x 5 reps  -add HEP NM re ed:  -lateral walk out for multifidus, cable machine 5.0 wt x 10 each side  (maintain neutral spine)  8 min NM re ed:  Hook lying, ab brace with B inch platform, come to standing, full spine flexion and knee extension  Hold 15 sec x 5 reps   5 min NM re ed:  Cable machine:  L and R trunk rotation with 7.5 wt x 15 2 sets each side There ex:   Trunk flexion in sitting,hands on 8 inch platform, come to tilt/stress to lumbar spine so she can walk in the community with little to no back leg/hip pain, no difficulty.  Met   -Improve body mechanics/posture awareness so pt can self correct sleeping positioning so she can sleep w/o interruption from pain 4 of 7

## 2018-08-29 RX ORDER — GABAPENTIN 300 MG/1
300 CAPSULE ORAL 3 TIMES DAILY
Qty: 90 CAPSULE | Refills: 1 | Status: SHIPPED | OUTPATIENT
Start: 2018-08-29 | End: 2018-11-20

## 2018-08-29 NOTE — TELEPHONE ENCOUNTER
Medication: Gabapentin 300 Mg     Date of last refill: 6/18/18  60 Caps     [place]     Last office visit: 7/30/18     Due back to clinic per last office note:  3 Months     Date next office visit scheduled:  11/7/18     Last OV note recommendation: Per Cleven Castleman, MD     PLAN:  -PT  -tramadol, gabapentin q8  -RTC in 3 months with repeat x-rays

## 2018-08-30 ENCOUNTER — OFFICE VISIT (OUTPATIENT)
Dept: PHYSICAL THERAPY | Age: 53
End: 2018-08-30
Attending: PHYSICIAN ASSISTANT
Payer: COMMERCIAL

## 2018-08-30 PROCEDURE — 97112 NEUROMUSCULAR REEDUCATION: CPT

## 2018-08-30 PROCEDURE — 97110 THERAPEUTIC EXERCISES: CPT

## 2018-08-30 NOTE — PROGRESS NOTES
Dx:     s/p L5-S1 revision decompression and fusion with extension to L4 (5/4/18)        Fall Risk: standard         Precautions:surgical              Subjective: To begin Yoga this weekend. Reports overall improved.   Reports has sporadic various episodes hip flex stretch  X 30 sec x 3 each  5 min  Add HEP There ex:  Kneel on floor hip flex stretch  X 30 sec x 3 each  5 min There ex:  Kneel on floor hip flex stretch  X 30 sec x 3 each  5 min  (HEP) There ex:  Kneel on floor hip flex stretch  X 30 sec x 3 ea dowel jas for feed back  X 10 2 sets      There ex:   Trunk flexion in sitting,hands on 8 inch platform, come to standing, full spine flexion and knee extension  Hold 15 sec x 5 reps   5 min There ex:   Trunk flexion in sitting,hands on 8 inch platform, co strength to WFL's so she can decrease anterior pelvic tilt/stress to lumbar spine so she can walk in the community with little to no back leg/hip pain, no difficulty.  Met   -Improve body mechanics/posture awareness so pt can self correct sleeping positioni

## 2018-10-05 ENCOUNTER — MYAURORA ACCOUNT LINK (OUTPATIENT)
Dept: OTHER | Age: 53
End: 2018-10-05

## 2018-10-05 ENCOUNTER — PRIOR ORIGINAL RECORDS (OUTPATIENT)
Dept: OTHER | Age: 53
End: 2018-10-05

## 2018-10-10 LAB
BUN: 10 MG/DL
BUN: 13 MG/DL
BUN: 8 MG/DL
CALCIUM: 8.2 MG/DL
CALCIUM: 8.6 MG/DL
CALCIUM: 8.6 MG/DL
CHLORIDE: 105 MEQ/L
CHLORIDE: 106 MEQ/L
CHLORIDE: 107 MEQ/L
CREATININE, SERUM: 0.78 MG/DL
CREATININE, SERUM: 0.87 MG/DL
CREATININE, SERUM: 0.91 MG/DL
GLUCOSE: 102 MG/DL
GLUCOSE: 130 MG/DL
GLUCOSE: 93 MG/DL
HEMATOCRIT: 33.4 %
HEMOGLOBIN: 11.3 G/DL
PLATELETS: 197 K/UL
POTASSIUM, SERUM: 3.5 MEQ/L
POTASSIUM, SERUM: 3.9 MEQ/L
POTASSIUM, SERUM: 3.9 MEQ/L
POTASSIUM, SERUM: 4.1 MEQ/L
RED BLOOD COUNT: 3.69 X 10-6/U
SODIUM: 138 MEQ/L
SODIUM: 140 MEQ/L
SODIUM: 140 MEQ/L
WHITE BLOOD COUNT: 11.2 X 10-3/U

## 2018-10-14 ENCOUNTER — HOSPITAL ENCOUNTER (OUTPATIENT)
Dept: ULTRASOUND IMAGING | Age: 53
Discharge: HOME | End: 2018-10-14
Attending: INTERNAL MEDICINE
Payer: COMMERCIAL

## 2018-10-14 DIAGNOSIS — R60.9 EDEMA: ICD-10-CM

## 2018-10-14 PROCEDURE — 93970 EXTREMITY STUDY: CPT | Performed by: INTERNAL MEDICINE

## 2018-10-15 ENCOUNTER — OFFICE VISIT (OUTPATIENT)
Dept: DERMATOLOGY CLINIC | Facility: CLINIC | Age: 53
End: 2018-10-15

## 2018-10-15 DIAGNOSIS — L30.9 DERMATITIS: Primary | ICD-10-CM

## 2018-10-15 DIAGNOSIS — L70.0 ACNE VULGARIS: ICD-10-CM

## 2018-10-15 PROCEDURE — 99213 OFFICE O/P EST LOW 20 MIN: CPT | Performed by: DERMATOLOGY

## 2018-10-15 PROCEDURE — 99212 OFFICE O/P EST SF 10 MIN: CPT | Performed by: DERMATOLOGY

## 2018-10-15 RX ORDER — CLINDAMYCIN PHOSPHATE 10 MG/ML
1 SOLUTION TOPICAL 2 TIMES DAILY
Qty: 60 EACH | Refills: 11 | Status: SHIPPED | OUTPATIENT
Start: 2018-10-15 | End: 2018-11-14

## 2018-10-17 ENCOUNTER — PRIOR ORIGINAL RECORDS (OUTPATIENT)
Dept: OTHER | Age: 53
End: 2018-10-17

## 2018-10-22 ENCOUNTER — PRIOR ORIGINAL RECORDS (OUTPATIENT)
Dept: OTHER | Age: 53
End: 2018-10-22

## 2018-10-23 ENCOUNTER — MYAURORA ACCOUNT LINK (OUTPATIENT)
Dept: OTHER | Age: 53
End: 2018-10-23

## 2018-10-23 ENCOUNTER — HOSPITAL ENCOUNTER (OUTPATIENT)
Dept: CV DIAGNOSTICS | Age: 53
Discharge: HOME | End: 2018-10-23
Attending: INTERNAL MEDICINE
Payer: COMMERCIAL

## 2018-10-23 DIAGNOSIS — R60.0 LOCALIZED EDEMA: ICD-10-CM

## 2018-10-25 ENCOUNTER — PRIOR ORIGINAL RECORDS (OUTPATIENT)
Dept: OTHER | Age: 53
End: 2018-10-25

## 2018-10-28 NOTE — PROGRESS NOTES
Kelley Dubois is a 48year old female.     Patient presents with:  Rash: \"New pt\"- Pt presents today with red bumps x 3 months throughout body  experiencing itching pt notes started taking gabapentin and duloxetine and 5 days into medication is when r since quittin.6      Smokeless tobacco: Never Used    Alcohol use: No    Drug use: Yes      Types: Cannabis      Comment: daily                  Current Outpatient Medications:  triamcinolone acetonide 0.1 % External Cream Use bid as directed Disp: 110 CERVICAL FUSION BG & INST 3 LEVEL N/A 2017    Performed by Sherrie Chen MD at [facility] MAIN OR   • BACK SURGERY  2018    lumbar five-sacral one revision of decompression and fusion AND EXTENSION TO LUMBAR FOUR    •   X 1   • CHOLECYSTECT 1 cup daily        Occupational Exposure: Not Asked        Hobby Hazards: Not Asked        Sleep Concern: Not Asked        Stress Concern: Not Asked        Weight Concern: Not Asked        Special Diet: Not Asked        Back Care: Not Asked        Exercis face,nails, hair, external eyes, including conjunctival mucosa, eyelids, oral mucosa, external ears, back, chest, abdomen, bilateral arms, bilateral legs, palms.         Remarkable for multiple erythematous urticarial papules patches with secondary examinat for 60 doses. Apply thin film to affected area(s). Dermatitis  (primary encounter diagnosis)    No orders of the defined types were placed in this encounter.       Results From Past 48 Hours:  No results found for this or any previous visit (from the

## 2018-10-30 ENCOUNTER — TELEPHONE (OUTPATIENT)
Dept: SURGERY | Facility: CLINIC | Age: 53
End: 2018-10-30

## 2018-10-30 RX ORDER — CYCLOBENZAPRINE HCL 10 MG
10 TABLET ORAL 3 TIMES DAILY PRN
Qty: 60 TABLET | Refills: 0 | Status: CANCELLED | OUTPATIENT
Start: 2018-10-30 | End: 2018-11-09

## 2018-10-30 NOTE — TELEPHONE ENCOUNTER
LMTCB. Need to verify patient is requesting cyclobenzaprine as this has not been prescribed since 5/2018.        Medication: Cyclobenzaprine 10 mg    Date of last refill: 5/7/18  [place]    Last office visit: 7/30/

## 2018-10-31 ENCOUNTER — PRIOR ORIGINAL RECORDS (OUTPATIENT)
Dept: OTHER | Age: 53
End: 2018-10-31

## 2018-10-31 LAB
ALBUMIN: 4 G/DL
ALKALINE PHOSPHATATE(ALK PHOS): 90 IU/L
BILIRUBIN TOTAL: 0.6 MG/DL
BUN: 12 MG/DL
CALCIUM: 9.3 MG/DL
CHLORIDE: 102 MEQ/L
CREATININE, SERUM: 0.87 MG/DL
ESR (SED RATE): 17 MM/HR
GLOBULIN: 2.6 G/DL
POTASSIUM, SERUM: 3.8 MEQ/L
PROTEIN, TOTAL: 6.6 G/DL
SGOT (AST): 17 IU/L
SGPT (ALT): 15 IU/L
SODIUM: 136 MEQ/L

## 2018-11-07 ENCOUNTER — OFFICE VISIT (OUTPATIENT)
Dept: SURGERY | Facility: CLINIC | Age: 53
End: 2018-11-07

## 2018-11-07 ENCOUNTER — HOSPITAL ENCOUNTER (OUTPATIENT)
Dept: GENERAL RADIOLOGY | Age: 53
Discharge: HOME | End: 2018-11-07
Attending: NEUROLOGICAL SURGERY
Payer: COMMERCIAL

## 2018-11-07 VITALS — DIASTOLIC BLOOD PRESSURE: 78 MMHG | SYSTOLIC BLOOD PRESSURE: 118 MMHG | HEART RATE: 76 BPM

## 2018-11-07 DIAGNOSIS — M53.3 SI (SACROILIAC) JOINT DYSFUNCTION: ICD-10-CM

## 2018-11-07 DIAGNOSIS — M96.1 FAILED BACK SYNDROME: Primary | ICD-10-CM

## 2018-11-07 DIAGNOSIS — G89.29 CHRONIC LEFT-SIDED LOW BACK PAIN WITH LEFT-SIDED SCIATICA: ICD-10-CM

## 2018-11-07 DIAGNOSIS — M54.42 CHRONIC LEFT-SIDED LOW BACK PAIN WITH LEFT-SIDED SCIATICA: ICD-10-CM

## 2018-11-07 PROCEDURE — 72100 X-RAY EXAM L-S SPINE 2/3 VWS: CPT | Performed by: NEUROLOGICAL SURGERY

## 2018-11-07 PROCEDURE — 99212 OFFICE O/P EST SF 10 MIN: CPT | Performed by: PHYSICIAN ASSISTANT

## 2018-11-07 RX ORDER — CYCLOBENZAPRINE HCL 10 MG
10 TABLET ORAL 3 TIMES DAILY PRN
Qty: 60 TABLET | Refills: 1 | Status: SHIPPED | OUTPATIENT
Start: 2018-11-07 | End: 2019-03-25 | Stop reason: ALTCHOICE

## 2018-11-07 NOTE — PROGRESS NOTES
Neurosurgery Clinic Visit  2018    Deleta Stager PCP:  Sunday Gonzales MD    1965 MRN [id number]      CC: S/P L4-S1 revision fusion 18 Dr. Grace Love    HPI:    Alexandra Lowry is here for 6 month f/u.   She has had no change in her back or leg pain 6-CERVICAL 7 ANTERIOR DISCECTOMY AND                FUSION WITH INSTRUMENTATION AND MICROSCOPE                DISSECTIONN/AGeneral- Dr. Bass Adriel  05/04/2018: BACK SURGERY      Comment: lumbar five-sacral one revision of                decompression and fusion Tenderness     DIAGNOSTIC DATA:       IMAGING:    XR Lumbar -     Stable L4-S1 instrumented fusion without evidence of failure     ASSESSMENT:  47 yo F s/p revision and extension of fusion for pseudoarthrosis with persistent pain  Failed Back Syndrome  Chr

## 2018-11-07 NOTE — PROGRESS NOTES
Pt is here for follow up to review imaging Xray of the lumbar and PT. Pt states that she is still the same since the last time we seen her in office. Pt states that she is no longer in PT.        Pain Scale 0/10

## 2018-11-26 ENCOUNTER — TELEPHONE (OUTPATIENT)
Dept: PAIN CLINIC | Facility: CLINIC | Age: 53
End: 2018-11-26

## 2018-11-26 ENCOUNTER — OFFICE VISIT (OUTPATIENT)
Dept: PAIN CLINIC | Facility: CLINIC | Age: 53
End: 2018-11-26

## 2018-11-26 VITALS
BODY MASS INDEX: 23.39 KG/M2 | OXYGEN SATURATION: 98 % | DIASTOLIC BLOOD PRESSURE: 80 MMHG | HEIGHT: 67 IN | SYSTOLIC BLOOD PRESSURE: 110 MMHG | HEART RATE: 75 BPM | WEIGHT: 149 LBS

## 2018-11-26 DIAGNOSIS — M46.1 SACROILIITIS (HCC): Primary | ICD-10-CM

## 2018-11-26 DIAGNOSIS — M96.1 FAILED BACK SURGICAL SYNDROME: Primary | ICD-10-CM

## 2018-11-26 DIAGNOSIS — M47.816 LUMBAR FACET ARTHROPATHY: ICD-10-CM

## 2018-11-26 DIAGNOSIS — M47.819 SPONDYLOSIS WITHOUT MYELOPATHY: ICD-10-CM

## 2018-11-26 PROCEDURE — 99214 OFFICE O/P EST MOD 30 MIN: CPT | Performed by: ANESTHESIOLOGY

## 2018-11-26 NOTE — PATIENT INSTRUCTIONS
Refill policies:    • Allow 2-3 business days for refills; controlled substances may take longer.   • Contact your pharmacy at least 5 days prior to running out of medication and have them send an electronic request or submit request through the “request re entire amount billed. Precertification and Prior Authorizations: If your physician has recommended that you have a procedure or additional testing performed.   [facility]) will contact your insurance carrier to obtain pre-certi

## 2018-11-27 NOTE — TELEPHONE ENCOUNTER
Created referral [id number]  [id number] SIJ & Lumbar Facet [id number]  Uploaded clinical notes   Case pending

## 2018-11-27 NOTE — TELEPHONE ENCOUNTER
Medical clearance needed- no    Pt seen in OV today by Dr. Ngozi Crespo and recommended for [facility] and facet (X 2). Please begin PA process for procedure(s). 1. Laterality: bilateral  Level(s): SI joint    2.  Laterality: bilateral facet  Level(s): L2-S1    Pt i

## 2018-11-27 NOTE — PROGRESS NOTES
Name: Mike Zuñiga   : 1965   DOS: 2018     Pain Clinic Follow Up Visit:   Mike Zuñiga is a 48year old female who presents for recheck of her chronic low back and neck pain.   She is status post lumbar epidural, diagnostic lumbar facet Disp: 60 tablet Rfl: 1   triamcinolone acetonide 0.1 % External Cream Use bid as directed Disp: 454 g Rfl: 3   BuPROPion HCl ER, SR, (WELLBUTRIN SR) 150 MG Oral Tablet 12 Hr Take 1 tablet (150 mg total) by mouth daily.  Disp: 90 tablet Rfl: 0   TraMADol HCl short-term she will be a good candidate for radiofrequency ablation to get long-term pain relief.   If she does not get good relief I would recommend her to come back for spinal cord stimulator trial.  In that case I will send her to Dr. Claudio Kauffman for a psych

## 2018-11-29 NOTE — TELEPHONE ENCOUNTER
Please place cases with sedation:    Bilateral SIJI    Bilateral facet L2-S1    Patient scheduled for:    12/4/18 at 1100    12/13/18 at 945    Pre-procedure instructions reviewed with patient. Patient verbalized understanding.

## 2018-12-01 ENCOUNTER — HOSPITAL ENCOUNTER (OUTPATIENT)
Dept: CT IMAGING | Facility: HOSPITAL | Age: 53
Discharge: HOME | End: 2018-12-01
Attending: FAMILY MEDICINE

## 2018-12-01 ENCOUNTER — PRIOR ORIGINAL RECORDS (OUTPATIENT)
Dept: OTHER | Age: 53
End: 2018-12-01

## 2018-12-01 DIAGNOSIS — Z13.9 ENCOUNTER FOR SCREENING: ICD-10-CM

## 2018-12-04 ENCOUNTER — APPOINTMENT (OUTPATIENT)
Dept: GENERAL RADIOLOGY | Facility: HOSPITAL | Age: 53
End: 2018-12-04
Attending: ANESTHESIOLOGY
Payer: COMMERCIAL

## 2018-12-04 ENCOUNTER — HOSPITAL ENCOUNTER (OUTPATIENT)
Facility: HOSPITAL | Age: 53
Setting detail: HOSPITAL OUTPATIENT SURGERY
Discharge: HOME | End: 2018-12-04
Attending: ANESTHESIOLOGY | Admitting: ANESTHESIOLOGY
Payer: COMMERCIAL

## 2018-12-04 VITALS
RESPIRATION RATE: 18 BRPM | TEMPERATURE: 98 F | HEART RATE: 66 BPM | OXYGEN SATURATION: 96 % | DIASTOLIC BLOOD PRESSURE: 107 MMHG | SYSTOLIC BLOOD PRESSURE: 132 MMHG

## 2018-12-04 DIAGNOSIS — M47.819 SPONDYLOSIS WITHOUT MYELOPATHY: ICD-10-CM

## 2018-12-04 DIAGNOSIS — M46.1 SACROILIITIS (HCC): ICD-10-CM

## 2018-12-04 PROCEDURE — 3E0U3BZ INTRODUCTION OF ANESTHETIC AGENT INTO JOINTS, PERCUTANEOUS APPROACH: ICD-10-PCS | Performed by: ANESTHESIOLOGY

## 2018-12-04 PROCEDURE — 3E0U33Z INTRODUCTION OF ANTI-INFLAMMATORY INTO JOINTS, PERCUTANEOUS APPROACH: ICD-10-PCS | Performed by: ANESTHESIOLOGY

## 2018-12-04 PROCEDURE — 81025 URINE PREGNANCY TEST: CPT | Performed by: ANESTHESIOLOGY

## 2018-12-04 PROCEDURE — 99152 MOD SED SAME PHYS/QHP 5/>YRS: CPT | Performed by: ANESTHESIOLOGY

## 2018-12-04 RX ORDER — ONDANSETRON 2 MG/ML
4 INJECTION INTRAMUSCULAR; INTRAVENOUS ONCE AS NEEDED
Status: CANCELLED | OUTPATIENT
Start: 2018-12-04 | End: 2018-12-04

## 2018-12-04 RX ORDER — MIDAZOLAM HYDROCHLORIDE 1 MG/ML
INJECTION INTRAMUSCULAR; INTRAVENOUS AS NEEDED
Status: DISCONTINUED | OUTPATIENT
Start: 2018-12-04 | End: 2018-12-04 | Stop reason: HOSPADM

## 2018-12-04 RX ORDER — ONDANSETRON 2 MG/ML
4 INJECTION INTRAMUSCULAR; INTRAVENOUS ONCE AS NEEDED
Status: DISCONTINUED | OUTPATIENT
Start: 2018-12-04 | End: 2018-12-04 | Stop reason: HOSPADM

## 2018-12-04 RX ORDER — METHYLPREDNISOLONE ACETATE 40 MG/ML
INJECTION, SUSPENSION INTRA-ARTICULAR; INTRALESIONAL; INTRAMUSCULAR; SOFT TISSUE AS NEEDED
Status: DISCONTINUED | OUTPATIENT
Start: 2018-12-04 | End: 2018-12-04 | Stop reason: HOSPADM

## 2018-12-04 RX ORDER — DIPHENHYDRAMINE HYDROCHLORIDE 50 MG/ML
50 INJECTION INTRAMUSCULAR; INTRAVENOUS ONCE AS NEEDED
Status: CANCELLED | OUTPATIENT
Start: 2018-12-04 | End: 2018-12-04

## 2018-12-04 RX ORDER — SODIUM CHLORIDE, SODIUM LACTATE, POTASSIUM CHLORIDE, CALCIUM CHLORIDE 600; 310; 30; 20 MG/100ML; MG/100ML; MG/100ML; MG/100ML
100 INJECTION, SOLUTION INTRAVENOUS CONTINUOUS
Status: DISCONTINUED | OUTPATIENT
Start: 2018-12-04 | End: 2018-12-04

## 2018-12-04 RX ORDER — LIDOCAINE HYDROCHLORIDE 10 MG/ML
INJECTION, SOLUTION EPIDURAL; INFILTRATION; INTRACAUDAL; PERINEURAL AS NEEDED
Status: DISCONTINUED | OUTPATIENT
Start: 2018-12-04 | End: 2018-12-04 | Stop reason: HOSPADM

## 2018-12-04 NOTE — H&P
History & Physical Examination    Patient Name: Patricia Pugh  MRN: [id number]  CSN: [id number]  YOB: 1965    Pre-Operative Diagnosis:  [facility] [M46.1]  Spondylosis without myelopathy [M47.819]    Present Illness: A 48year old fe History:   Procedure Laterality Date   • ADDL NECK SPINE FUSION  07/11/2017    CERVICAL 4-CERVICAL 5, CERVICAL 5-CERVICAL 6, CERVICAL 6-CERVICAL 7 ANTERIOR DISCECTOMY AND FUSION WITH INSTRUMENTATION AND MICROSCOPE DISSECTIONN/AGeneral- Dr. Alfred Handley   • ANTERI UROGENITAL [x ] [x ]    EXTREMITIES [x ] [x ]    OTHER        [ x ] I have discussed the risks and benefits and alternatives with the patient/family. They understand and agree to proceed with plan of care.   [ x ] I have reviewed the History and Physical

## 2018-12-04 NOTE — OPERATIVE REPORT
[facility]  Operative Report  2018     Michaelle Craig [address] Patient Status:  Hospital Outpatient Surgery    1965 MRN [id number]   Location [facility] 14 Attending No att. providers found   Hosp Day # 0 PCP Rosi Bonilla overlapped. A 22-gauge, Quincke spinal needle was advanced into the middle portion of the first sacroiliac joint. After the needle  positions were confirmed by AP and lateral views of fluoroscopy, 1 cc Omnipaque-240 was injected into the sacroiliac joint.

## 2018-12-05 LAB — UFCT: 0 CA SCORE

## 2018-12-07 ENCOUNTER — PRIOR ORIGINAL RECORDS (OUTPATIENT)
Dept: OTHER | Age: 53
End: 2018-12-07

## 2018-12-07 ENCOUNTER — TELEPHONE (OUTPATIENT)
Dept: SURGERY | Facility: CLINIC | Age: 53
End: 2018-12-07

## 2018-12-07 NOTE — TELEPHONE ENCOUNTER
LMTCB, pt's VM does not have identifier; need to confirm appt date of 12/13 & remind of arrival time of 8:45am, confirm pt has called pre-procedure line, offer pt to call our office w/any questions after calling pre-procedure line.

## 2018-12-13 ENCOUNTER — HOSPITAL ENCOUNTER (OUTPATIENT)
Facility: HOSPITAL | Age: 53
Setting detail: HOSPITAL OUTPATIENT SURGERY
Discharge: HOME | End: 2018-12-13
Attending: ANESTHESIOLOGY | Admitting: ANESTHESIOLOGY
Payer: COMMERCIAL

## 2018-12-13 ENCOUNTER — APPOINTMENT (OUTPATIENT)
Dept: GENERAL RADIOLOGY | Facility: HOSPITAL | Age: 53
End: 2018-12-13
Attending: ANESTHESIOLOGY
Payer: COMMERCIAL

## 2018-12-13 VITALS
TEMPERATURE: 99 F | OXYGEN SATURATION: 96 % | DIASTOLIC BLOOD PRESSURE: 79 MMHG | SYSTOLIC BLOOD PRESSURE: 111 MMHG | RESPIRATION RATE: 16 BRPM | HEART RATE: 69 BPM

## 2018-12-13 DIAGNOSIS — M47.816 LUMBAR FACET ARTHROPATHY: ICD-10-CM

## 2018-12-13 PROCEDURE — 99152 MOD SED SAME PHYS/QHP 5/>YRS: CPT | Performed by: ANESTHESIOLOGY

## 2018-12-13 PROCEDURE — BR16YZZ FLUOROSCOPY OF LUMBAR FACET JOINT(S) USING OTHER CONTRAST: ICD-10-PCS | Performed by: ANESTHESIOLOGY

## 2018-12-13 PROCEDURE — 81025 URINE PREGNANCY TEST: CPT | Performed by: ANESTHESIOLOGY

## 2018-12-13 PROCEDURE — 3E0U3BZ INTRODUCTION OF ANESTHETIC AGENT INTO JOINTS, PERCUTANEOUS APPROACH: ICD-10-PCS | Performed by: ANESTHESIOLOGY

## 2018-12-13 PROCEDURE — 3E0U33Z INTRODUCTION OF ANTI-INFLAMMATORY INTO JOINTS, PERCUTANEOUS APPROACH: ICD-10-PCS | Performed by: ANESTHESIOLOGY

## 2018-12-13 RX ORDER — DIPHENHYDRAMINE HYDROCHLORIDE 50 MG/ML
50 INJECTION INTRAMUSCULAR; INTRAVENOUS ONCE AS NEEDED
Status: CANCELLED | OUTPATIENT
Start: 2018-12-13 | End: 2018-12-13

## 2018-12-13 RX ORDER — ONDANSETRON 2 MG/ML
4 INJECTION INTRAMUSCULAR; INTRAVENOUS ONCE AS NEEDED
Status: DISCONTINUED | OUTPATIENT
Start: 2018-12-13 | End: 2018-12-13 | Stop reason: HOSPADM

## 2018-12-13 RX ORDER — LIDOCAINE HYDROCHLORIDE 10 MG/ML
INJECTION, SOLUTION EPIDURAL; INFILTRATION; INTRACAUDAL; PERINEURAL AS NEEDED
Status: DISCONTINUED | OUTPATIENT
Start: 2018-12-13 | End: 2018-12-13 | Stop reason: HOSPADM

## 2018-12-13 RX ORDER — ONDANSETRON 2 MG/ML
4 INJECTION INTRAMUSCULAR; INTRAVENOUS ONCE AS NEEDED
Status: CANCELLED | OUTPATIENT
Start: 2018-12-13 | End: 2018-12-13

## 2018-12-13 RX ORDER — MIDAZOLAM HYDROCHLORIDE 1 MG/ML
INJECTION INTRAMUSCULAR; INTRAVENOUS AS NEEDED
Status: DISCONTINUED | OUTPATIENT
Start: 2018-12-13 | End: 2018-12-13 | Stop reason: HOSPADM

## 2018-12-13 RX ORDER — SODIUM CHLORIDE, SODIUM LACTATE, POTASSIUM CHLORIDE, CALCIUM CHLORIDE 600; 310; 30; 20 MG/100ML; MG/100ML; MG/100ML; MG/100ML
100 INJECTION, SOLUTION INTRAVENOUS CONTINUOUS
Status: DISCONTINUED | OUTPATIENT
Start: 2018-12-13 | End: 2018-12-13

## 2018-12-13 RX ORDER — ONDANSETRON 2 MG/ML
4 INJECTION INTRAMUSCULAR; INTRAVENOUS ONCE AS NEEDED
Status: DISCONTINUED | OUTPATIENT
Start: 2018-12-13 | End: 2018-12-13

## 2018-12-13 RX ORDER — METHYLPREDNISOLONE ACETATE 40 MG/ML
INJECTION, SUSPENSION INTRA-ARTICULAR; INTRALESIONAL; INTRAMUSCULAR; SOFT TISSUE AS NEEDED
Status: DISCONTINUED | OUTPATIENT
Start: 2018-12-13 | End: 2018-12-13 | Stop reason: HOSPADM

## 2018-12-13 RX ORDER — DIPHENHYDRAMINE HYDROCHLORIDE 50 MG/ML
50 INJECTION INTRAMUSCULAR; INTRAVENOUS ONCE AS NEEDED
Status: DISCONTINUED | OUTPATIENT
Start: 2018-12-13 | End: 2018-12-13

## 2018-12-13 NOTE — H&P
History & Physical Examination    Patient Name: Dianne Gonzales  MRN: [id number]  [facility]: [id number]  YOB: 1965    Pre-Operative Diagnosis:  Lumbar facet arthropathy [M47.816]    Present Illness: A 48year old female with low back pain is here fo Procedure Laterality Date   • ADDL NECK SPINE FUSION  07/11/2017    CERVICAL 4-CERVICAL 5, CERVICAL 5-CERVICAL 6, CERVICAL 6-CERVICAL 7 ANTERIOR DISCECTOMY AND FUSION WITH INSTRUMENTATION AND MICROSCOPE DISSECTIONN/AGeneral- Dr. Flaherty Purchase   • [facility] to palpation bilateral lumbar facet joints. HEART [x ] [x ]    LUNGS [x ] [x ]    ABDOMEN [x ] [x ]    UROGENITAL [x ] [x ]    EXTREMITIES [x ] [x ]    OTHER        [ x ] I have discussed the risks and benefits and alternatives with the patient/family.

## 2018-12-14 NOTE — OPERATIVE REPORT
[facility]  Operative Report  2018     Prabhu Josué [address]:  [facility]    1965 MRN [id number]   [facility] Attending No att. providers found   Hosp Day # 0 PCP SAMANTHA 1% lidocaine. A 22-gauge 3.5\" Quincke spinal needle was introduced and advanced into the left L2-L3, L3-L4, L4-5 and L5-S1 levels atraumatically under fluoroscopic guidance.   Following negative aspiration for CSF and blood, approximately 1 mL of 1% lidoc

## 2018-12-15 ENCOUNTER — HOSPITAL ENCOUNTER (OUTPATIENT)
Dept: GENERAL RADIOLOGY | Age: 53
Discharge: HOME | End: 2018-12-15
Attending: NEUROLOGICAL SURGERY
Payer: COMMERCIAL

## 2018-12-15 DIAGNOSIS — Z98.1 S/P CERVICAL SPINAL FUSION: ICD-10-CM

## 2018-12-15 PROCEDURE — 72040 X-RAY EXAM NECK SPINE 2-3 VW: CPT | Performed by: NEUROLOGICAL SURGERY

## 2018-12-19 ENCOUNTER — HOSPITAL ENCOUNTER (OUTPATIENT)
Dept: CT IMAGING | Facility: HOSPITAL | Age: 53
Discharge: HOME | End: 2018-12-19
Attending: INTERNAL MEDICINE
Payer: COMMERCIAL

## 2018-12-19 DIAGNOSIS — I77.89 ENLARGED THORACIC AORTA (HCC): ICD-10-CM

## 2018-12-19 PROCEDURE — 82565 ASSAY OF CREATININE: CPT

## 2018-12-19 PROCEDURE — 71275 CT ANGIOGRAPHY CHEST: CPT | Performed by: INTERNAL MEDICINE

## 2019-01-03 ENCOUNTER — PRIOR ORIGINAL RECORDS (OUTPATIENT)
Dept: OTHER | Age: 54
End: 2019-01-03

## 2019-01-23 ENCOUNTER — OFFICE VISIT (OUTPATIENT)
Dept: PAIN CLINIC | Facility: CLINIC | Age: 54
End: 2019-01-23

## 2019-01-23 VITALS
DIASTOLIC BLOOD PRESSURE: 80 MMHG | HEIGHT: 67 IN | HEART RATE: 78 BPM | WEIGHT: 249 LBS | SYSTOLIC BLOOD PRESSURE: 110 MMHG | BODY MASS INDEX: 39.08 KG/M2 | OXYGEN SATURATION: 96 %

## 2019-01-23 DIAGNOSIS — M96.1 FAILED BACK SURGICAL SYNDROME: Primary | ICD-10-CM

## 2019-01-23 PROCEDURE — 99214 OFFICE O/P EST MOD 30 MIN: CPT | Performed by: ANESTHESIOLOGY

## 2019-01-23 NOTE — PATIENT INSTRUCTIONS
Refill policies:    • Allow 2-3 business days for refills; controlled substances may take longer.   • Contact your pharmacy at least 5 days prior to running out of medication and have them send an electronic request or submit request through the “request re entire amount billed. Precertification and Prior Authorizations: If your physician has recommended that you have a procedure or additional testing performed.   [facility]) will contact your insurance carrier to obtain pre-certi

## 2019-01-28 NOTE — PROGRESS NOTES
Name: Ryan Casey   : 1965   DOS: 2019     Pain Clinic Follow Up Visit:   Ryan Casey is a 48year old female who presents for recheck of her chronic low back and neck pain.   She is status post lumbar epidural, diagnostic lumbar facet Rfl: 0   PROCTO-MED HC 2.5 % Rectal Cream  Disp:  Rfl: 0   Cyclobenzaprine HCl 10 MG Oral Tab Take 1 tablet (10 mg total) by mouth 3 (three) times daily as needed for Muscle spasms.  Disp: 60 tablet Rfl: 1   triamcinolone acetonide 0.1 % External Cream Use Prescriptions      No prescriptions requested or ordered in this encounter       Radiology orders and consultations:None    The patient indicates understanding of these issues and agrees to the plan. No Follow-up on file.     Ashlie Grimaldo MD, 1/23/2019

## 2019-02-07 ENCOUNTER — TELEPHONE (OUTPATIENT)
Dept: SURGERY | Facility: CLINIC | Age: 54
End: 2019-02-07

## 2019-02-07 NOTE — TELEPHONE ENCOUNTER
Patient advised to schedule appt with Dr Ayan Swan or MADYSON's. Per PSR - patient stated she thought she was overreacting and  did not schedule an ppt. She will call office back later.

## 2019-02-11 ENCOUNTER — HOSPITAL ENCOUNTER (OUTPATIENT)
Age: 54
Discharge: HOME | End: 2019-02-11
Attending: EMERGENCY MEDICINE
Payer: COMMERCIAL

## 2019-02-11 VITALS
DIASTOLIC BLOOD PRESSURE: 92 MMHG | TEMPERATURE: 98 F | HEART RATE: 71 BPM | SYSTOLIC BLOOD PRESSURE: 125 MMHG | RESPIRATION RATE: 20 BRPM | OXYGEN SATURATION: 94 %

## 2019-02-11 DIAGNOSIS — M79.601 PAIN IN BOTH UPPER EXTREMITIES: Primary | ICD-10-CM

## 2019-02-11 DIAGNOSIS — R07.89 CHEST PAIN, ATYPICAL: ICD-10-CM

## 2019-02-11 DIAGNOSIS — M79.602 PAIN IN BOTH UPPER EXTREMITIES: Primary | ICD-10-CM

## 2019-02-11 LAB — ISTAT TROPONIN: <0.1 NG/ML (ref ?–0.1)

## 2019-02-11 PROCEDURE — 84484 ASSAY OF TROPONIN QUANT: CPT

## 2019-02-11 PROCEDURE — 99214 OFFICE O/P EST MOD 30 MIN: CPT

## 2019-02-11 PROCEDURE — 93010 ELECTROCARDIOGRAM REPORT: CPT

## 2019-02-11 PROCEDURE — 93005 ELECTROCARDIOGRAM TRACING: CPT

## 2019-02-11 NOTE — ED INITIAL ASSESSMENT (HPI)
Pt states has woken with pain to left arm and tingling to hand. States may have been in right arm, not sure. Also has left chest pain that radiates to her mid back today. No arm pain at this time.   No tingling at this time  States has history of cervica

## 2019-02-11 NOTE — ED PROVIDER NOTES
Patient Seen in: [facility]    History   Patient presents with:  Chest Pain  Arm Pain    Stated Complaint: tingling arms    HPI    Patient has a history of high blood pressure, high cholesterol, anxiety, and depression.   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 Neurologic:  Mental status as above. Patient moves all extremities with good strength and coordination.   Sensation intact to light touch on the hands       ED Course     Labs Reviewed   ISTAT TROPONIN - Normal          An EKG was performed diagnosis)  Chest pain, atypical    Disposition:  Discharge  2/11/2019  5:35 pm    Follow-up:  [facility]  [phone]              Medications Prescribed:  Current Discharge Medication List

## 2019-02-12 LAB
ATRIAL RATE: 69 BPM
P AXIS: 62 DEGREES
P-R INTERVAL: 144 MS
Q-T INTERVAL: 420 MS
QRS DURATION: 82 MS
QTC CALCULATION (BEZET): 450 MS
R AXIS: 39 DEGREES
T AXIS: 55 DEGREES
VENTRICULAR RATE: 69 BPM

## 2019-02-18 ENCOUNTER — ORDER TRANSCRIPTION (OUTPATIENT)
Dept: ADMINISTRATIVE | Facility: HOSPITAL | Age: 54
End: 2019-02-18

## 2019-02-18 DIAGNOSIS — M54.10 RADICULAR SYNDROME OF LOWER LIMBS: Primary | ICD-10-CM

## 2019-02-25 ENCOUNTER — TELEPHONE (OUTPATIENT)
Dept: SURGERY | Facility: CLINIC | Age: 54
End: 2019-02-25

## 2019-02-25 DIAGNOSIS — M96.1 FAILED BACK SURGICAL SYNDROME: Primary | ICD-10-CM

## 2019-02-25 NOTE — TELEPHONE ENCOUNTER
Per Dr Nya Horton notes:     ASSESSMENT AND PLAN:   Failed back surgical syndrome  Sacroiliitis  Lumbar facet arthropathy  Lumbar radiculitis  Cervical facet arthropathy        Today I had a long discussion with the patient about the management.   We discussed

## 2019-02-25 NOTE — TELEPHONE ENCOUNTER
Spoke with patient who stated she completed Neuropsych eval with Dr Vicky Sotomayor at least three weeks ago and was informed office will call to schedule.  Informed patient next step is the providers will need to review the report and the procedure will need to be

## 2019-02-25 NOTE — TELEPHONE ENCOUNTER
Pt states she's waiting to hear about spinal stimulator, no TE found regarding this although discussed during OV on 01/23/19, please call pt back @ work #

## 2019-02-27 ENCOUNTER — TELEPHONE (OUTPATIENT)
Dept: SURGERY | Facility: CLINIC | Age: 54
End: 2019-02-27

## 2019-02-27 NOTE — TELEPHONE ENCOUNTER
Discussed with Dr Sandee Fritz. Please advise patient report is in process and she will be contacted by provider once report is completed. Patient was notified of above . She verbalized understanding.    States she has been experiencing constant tingling in

## 2019-02-27 NOTE — TELEPHONE ENCOUNTER
She should proceed with EMG and can go with either one of us. Dr. Alma Olivo operated on her neck but I have operated on her lumbar spine. She should see whichever one she prefers.

## 2019-02-27 NOTE — TELEPHONE ENCOUNTER
Patient transferred from pain management. Has been experiencing pain in the R and L arm (from elbow to the fingers) x 1 month. Tingling goes into fingers, affects all fingers but the worst in the 3rd digit. Pain/tingling waking her up at times.  Denies

## 2019-02-28 VITALS
DIASTOLIC BLOOD PRESSURE: 80 MMHG | WEIGHT: 245 LBS | SYSTOLIC BLOOD PRESSURE: 106 MMHG | HEART RATE: 81 BPM | HEIGHT: 66 IN | BODY MASS INDEX: 39.37 KG/M2

## 2019-02-28 NOTE — TELEPHONE ENCOUNTER
Spoke with patient and notified her of recommendations. Patient was transferred to reception to schedule.      Future Appointments   Date Time Provider Nacho Clair   3/14/2019  1:00 PM Kallie Mayberry MD [facility] EMG Denis Pulido   3/18/2019  3:00 PM Tay Walker

## 2019-02-28 NOTE — TELEPHONE ENCOUNTER
Psych evaluation for scs trial received and okay to proceed with procedure. Please start prior authorization and then contact patient for scheduling. Case request placed.

## 2019-02-28 NOTE — TELEPHONE ENCOUNTER
Neuropsych eval sent to scanning. To be scanned to [facility] scan queue    Medical clearance needed- NO     Pt  recommended for Spinal cord stimulator Trial.  Please begin PA process for procedure(s).      Pt informed callback will be given when PA feedback rece

## 2019-03-05 NOTE — TELEPHONE ENCOUNTER
Spoke with patient and scheduled injections. Reviewed pre-op instructions. Patient verbalized understanding, no further questions at this time. Pre-op instructions sent via [employer].        [address]  PRE-PROCEDURE INSTRUCTIONS WITH IV KOURTNEY ? Please note-No prescriptions will be written by Pain Clinic in OR on the day of procedure. If you require a refill of medications, please contact the office 48 hours prior to your procedure.   ? If you have an implanted Spinal Cord or Peripheral Nerve Sti Please schedule a follow up visit within 2 to 4 weeks after your last procedure date   Please call our office with any questions or concerns before or after your procedure at [phone].   If you are a diabetic, please increase the frequency of your There are several steps involved and this is a lengthy process. First, for insurance purposes, you must be seen and evaluated by a psychologist; we can provide you with contact information if you are not currently under the care of one.  Dr. Aide Jones or girish How is it a spinal cord stimulator trial lead placement actually performed? You will be monitored with an EKG, blood pressure cuff and an oxygen-monitoring device. The procedure is performed under sterile conditions.  In a spinal cord stimulator trial, t What should I expect after a spinal cord stimulator? If the procedure is successful, your pain may be gone or diminished. You may feel a constant sensation of stimulation, often described as warm or tingly.  You may have soreness due to the needle placem Videos about spinal cord stimulation are available in the office for you to take home. More detailed information is available from the manufacturers of these devices. Such information is available at [employer] website at www.[employer].        February 2

## 2019-03-05 NOTE — TELEPHONE ENCOUNTER
Spoke with Matthias Dubose, [employer] rep and informed of patient schedule procedure date. Rep updated calendar and confirmed procedure date and time. States [facility] may be attending procedure. Pt lead pull TBD.       Called patient and LM with pt to call office to

## 2019-03-07 ENCOUNTER — TELEPHONE (OUTPATIENT)
Dept: PAIN CLINIC | Facility: CLINIC | Age: 54
End: 2019-03-07

## 2019-03-07 NOTE — TELEPHONE ENCOUNTER
LMTCB    SCS trial scheduled on 4/11/19 needs to be rescheduled. Rep is not available for procedure. Can be done on 4/18/19. Case placed in depot.

## 2019-03-14 ENCOUNTER — OFFICE VISIT (OUTPATIENT)
Dept: ELECTROPHYSIOLOGY | Facility: HOSPITAL | Age: 54
End: 2019-03-14
Attending: FAMILY MEDICINE
Payer: COMMERCIAL

## 2019-03-14 DIAGNOSIS — G56.03 BILATERAL CARPAL TUNNEL SYNDROME: Primary | ICD-10-CM

## 2019-03-14 DIAGNOSIS — M54.10 RADICULAR SYNDROME OF LOWER LIMBS: ICD-10-CM

## 2019-03-14 PROCEDURE — 95910 NRV CNDJ TEST 7-8 STUDIES: CPT | Performed by: OTHER

## 2019-03-14 PROCEDURE — 95886 MUSC TEST DONE W/N TEST COMP: CPT | Performed by: OTHER

## 2019-03-18 ENCOUNTER — OFFICE VISIT (OUTPATIENT)
Dept: SURGERY | Facility: CLINIC | Age: 54
End: 2019-03-18

## 2019-03-18 VITALS — SYSTOLIC BLOOD PRESSURE: 130 MMHG | DIASTOLIC BLOOD PRESSURE: 80 MMHG | HEART RATE: 74 BPM

## 2019-03-18 DIAGNOSIS — G56.02 CARPAL TUNNEL SYNDROME OF LEFT WRIST: Primary | ICD-10-CM

## 2019-03-18 PROCEDURE — 99213 OFFICE O/P EST LOW 20 MIN: CPT | Performed by: NEUROLOGICAL SURGERY

## 2019-03-18 NOTE — PROGRESS NOTES
[facility]  Neurosurgery Clinic Visit      HISTORY OF PRESENT ILLNESS:  Shane Campbell is a(n) 48year old female s/p 5/4/18 L4-S1 extension, revision of fusion.  She has stable back pain, has had some pain above her waist line recently Performed by Albert Junior MD at [facility] MAIN OR   • REMOVAL GALLBLADDER     • REMOVAL OF TONSILS,12+ Y/O     • SACROILIAC JOINT INJECTION BILATERAL Bilateral 12/4/2018    Performed by Pelon Chung MD at [facility] MAIN OR   • SACROILIAC JOINT INJECTION RIGHT OR syndrome    IMAGING:  none    ASSESSMENT:  47 yo F with a history of cervical and lumbar spine fusions, now with bilateral symptomatic CTS, she is right hand dominant but with worse findings on the left    PLAN:  -plan for left CTS release first   -procedu

## 2019-03-18 NOTE — PROGRESS NOTES
Pt states that she has been having tinging in the hands. Pt states that she has burning. Pt states that she has been having these symptomes for 2 months,     EMG obtained.

## 2019-03-19 ENCOUNTER — TELEPHONE (OUTPATIENT)
Dept: SURGERY | Facility: CLINIC | Age: 54
End: 2019-03-19

## 2019-03-19 DIAGNOSIS — G56.02 CARPAL TUNNEL SYNDROME OF LEFT WRIST: Primary | ICD-10-CM

## 2019-03-19 DIAGNOSIS — Z01.818 PRE-OP TESTING: ICD-10-CM

## 2019-03-19 NOTE — TELEPHONE ENCOUNTER
Spoke with patient who wanted to proceed with carpal tunnel surgery first.     Call tx to  for patient to schedule 2 weeks post-op appointment.  Patient will then need to be transferred to pain service to reschedule her SCS trial.    Will still ne

## 2019-03-19 NOTE — TELEPHONE ENCOUNTER
DIALLO. Spoke with pain service who stated patient will need 4 weeks between her surgeries for recovery.  Will need to see which surgery patient would like to proceed with first.

## 2019-03-19 NOTE — TELEPHONE ENCOUNTER
LMTCB. Patient scheduled for left carpal tunnel release surgery with  on 4/9/19. Will need to review below surgery instructions with patient. · You will need preoperative labs, the orders have been placed.   · No blood thinning medication

## 2019-03-19 NOTE — TELEPHONE ENCOUNTER
Spoke with patient who stated she would like to continue with 4/9/19 if Job Siad is ok with this as well as she has a SCS trial scheduled for 4/18/19. Patient also wanted to know if surgery can be done on her right wrist first as the pain is more severe.

## 2019-03-20 ENCOUNTER — TELEPHONE (OUTPATIENT)
Dept: CARDIOLOGY | Age: 54
End: 2019-03-20

## 2019-03-20 NOTE — TELEPHONE ENCOUNTER
Patient informed of the message below and understood. States she just recently found out she has a dilated aorta and is seeing a cardiologist on 4/4/19. Letter faxed requesting cards clearance. IHP PA needed.

## 2019-03-21 NOTE — TELEPHONE ENCOUNTER
Discussed symptoms and imaging results with patient. She is having musculoskeletal neck pain without radicular or myelopathic features. Imaging from yesterday is unremarkable, had a recent cervical MRI.  Pain similar to this has always resolved with rest an
LMTCB, returning patient's call
LMTCB:    Need to inform pt of message below
Message below forwarded on to /Jossie Cuellar.
PA consulted, order for Xray placed    Norco and muscle relaxer will be prescribed tomorrow when provider in the office to sign script.     Order initiated, pended for provider to completion
Patient went to ER yesterday with negative imaging. Will have to follow up on how she is feeling before back surgery.
Pt calling back:    Reports she is having cervical pain from ear to shoulder blade on left side, also has symptoms on left arm. States this is exaclty as it was prior to cervical surgery. She is worried lumbar surgery will need to be postponed or cancelled.
Pt would like Provider to return to discuss possible surgery
complains of pain/discomfort

## 2019-03-21 NOTE — TELEPHONE ENCOUNTER
[facility] referral #[id number] initiated for Carpal Tunnel Release ([id number]), pending review.

## 2019-03-24 LAB
ABSOLUTE BASOPHILS: 71 CELLS/UL (ref 0–200)
ABSOLUTE EOSINOPHILS: 128 CELLS/UL (ref 15–500)
ABSOLUTE LYMPHOCYTES: 2201 CELLS/UL (ref 850–3900)
ABSOLUTE MONOCYTES: 518 CELLS/UL (ref 200–950)
ABSOLUTE NEUTROPHILS: 4182 CELLS/UL (ref 1500–7800)
ALBUMIN/GLOBULIN RATIO: 1.5 (CALC) (ref 1–2.5)
ALBUMIN: 4.2 G/DL (ref 3.6–5.1)
ALKALINE PHOSPHATASE: 83 U/L (ref 33–130)
ALT: 27 U/L (ref 6–29)
AST: 19 U/L (ref 10–35)
BASOPHILS: 1 %
BILIRUBIN, TOTAL: 0.8 MG/DL (ref 0.2–1.2)
BUN: 11 MG/DL (ref 7–25)
CALCIUM: 9.5 MG/DL (ref 8.6–10.4)
CARBON DIOXIDE: 28 MMOL/L (ref 20–32)
CHLORIDE: 105 MMOL/L (ref 98–110)
CREATININE: 0.94 MG/DL (ref 0.5–1.05)
EGFR IF AFRICN AM: 81 ML/MIN/1.73M2
EGFR IF NONAFRICN AM: 70 ML/MIN/1.73M2
EOSINOPHILS: 1.8 %
GLOBULIN: 2.8 G/DL (CALC) (ref 1.9–3.7)
GLUCOSE: 102 MG/DL (ref 65–99)
HEMATOCRIT: 41.4 % (ref 35–45)
HEMOGLOBIN: 14.4 G/DL (ref 11.7–15.5)
INR: 0.9
LYMPHOCYTES: 31 %
MCH: 30.9 PG (ref 27–33)
MCHC: 34.8 G/DL (ref 32–36)
MCV: 88.8 FL (ref 80–100)
MONOCYTES: 7.3 %
MPV: 11.7 FL (ref 7.5–12.5)
NEUTROPHILS: 58.9 %
PARTIAL THROMBOPLASTIN$TIME, ACTIVATED: 25 SEC (ref 22–34)
PLATELET COUNT: 267 THOUSAND/UL (ref 140–400)
POTASSIUM: 4.1 MMOL/L (ref 3.5–5.3)
PROTEIN, TOTAL: 7 G/DL (ref 6.1–8.1)
PT: 9.7 SEC (ref 9–11.5)
RDW: 13.1 % (ref 11–15)
RED BLOOD CELL COUNT: 4.66 MILLION/UL (ref 3.8–5.1)
SODIUM: 140 MMOL/L (ref 135–146)
WHITE BLOOD CELL COUNT: 7.1 THOUSAND/UL (ref 3.8–10.8)

## 2019-03-25 ENCOUNTER — TELEPHONE (OUTPATIENT)
Dept: SURGERY | Facility: CLINIC | Age: 54
End: 2019-03-25

## 2019-03-25 RX ORDER — FUROSEMIDE 20 MG/1
1 TABLET ORAL DAILY
COMMUNITY
Start: 2018-05-10

## 2019-03-25 RX ORDER — BUPROPION HYDROCHLORIDE 150 MG/1
1 TABLET, EXTENDED RELEASE ORAL 2 TIMES DAILY
COMMUNITY
Start: 2018-05-10

## 2019-03-25 RX ORDER — DULOXETIN HYDROCHLORIDE 60 MG/1
1 CAPSULE, DELAYED RELEASE ORAL DAILY
COMMUNITY
Start: 2018-05-10

## 2019-03-25 RX ORDER — POTASSIUM CHLORIDE 600 MG/1
1 TABLET, FILM COATED, EXTENDED RELEASE ORAL DAILY
COMMUNITY
Start: 2018-05-10

## 2019-03-25 RX ORDER — TRIAMTERENE AND HYDROCHLOROTHIAZIDE 37.5; 25 MG/1; MG/1
1 TABLET ORAL DAILY
COMMUNITY
Start: 2018-05-10

## 2019-03-25 RX ORDER — ATORVASTATIN CALCIUM 10 MG/1
1 TABLET, FILM COATED ORAL DAILY
COMMUNITY
Start: 2018-05-10 | End: 2023-10-11

## 2019-03-25 RX ORDER — GABAPENTIN 300 MG/1
1 CAPSULE ORAL 3 TIMES DAILY
COMMUNITY
Start: 2018-05-10

## 2019-03-25 RX ORDER — TRAMADOL HYDROCHLORIDE 50 MG/1
1 TABLET ORAL 3 TIMES DAILY
COMMUNITY
Start: 2018-05-10

## 2019-03-25 RX ORDER — CYCLOBENZAPRINE HCL 10 MG
10 TABLET ORAL 3 TIMES DAILY PRN
COMMUNITY
End: 2019-07-17

## 2019-03-25 NOTE — TELEPHONE ENCOUNTER
pt called back to pay $25 for FMLA forms to be completed. Pt would like forms to be faxed & mail her the original. She would also like section#7 to say may need intermitten days off (bloodwork, lab work, etc) regarding spinal cord stimulator. Endorsed to [facility]

## 2019-03-26 ENCOUNTER — TELEPHONE (OUTPATIENT)
Dept: CARDIOLOGY | Age: 54
End: 2019-03-26

## 2019-03-29 RX ORDER — CYCLOBENZAPRINE HCL 10 MG
TABLET ORAL
Qty: 60 TABLET | Refills: 0 | Status: SHIPPED | OUTPATIENT
Start: 2019-03-29 | End: 2019-04-02

## 2019-03-29 NOTE — TELEPHONE ENCOUNTER
Pt called asking FMLA forms be faxed to [employer] (insurance co, fax # on FMLA forms); pt also asks a copy be mailed to her home

## 2019-04-01 NOTE — TELEPHONE ENCOUNTER
STD & FMLA forms faxed to:     - [employer] @ [phone]     AND     - Dyaami Bhardwaj from [facility] @ [phone]    Confirmations received for both faxes. Copies sent to pt's home including receipt for form payment.  Originals sent to scan

## 2019-04-01 NOTE — TELEPHONE ENCOUNTER
Called pt to clarify where pt wanted forms to be sent; pt would like STD & FMLA forms to be sent to both [employer] @ [phone] & Terrie Mccoy from [facility] @ [phone], then copies sent to pt's home

## 2019-04-01 NOTE — TELEPHONE ENCOUNTER
Pt called stating her employer received our fax with forms however the Attending Provider Statement wasn't completed; forms removed from scanning pile and re-endorsed to RNs for completion

## 2019-04-02 NOTE — TELEPHONE ENCOUNTER
Forms were endorsed to  and were completed for her carpal tunnel release surgery. Message sent to patient via [employer].

## 2019-04-02 NOTE — TELEPHONE ENCOUNTER
Pt calling stating her employer requires her off work time to be written from April - June as \"intermittent leave\" so pt can work in between surgeries/procedures; please contact pt if new forms are needed for clarification

## 2019-04-03 NOTE — TELEPHONE ENCOUNTER
Patient calling back regarding paperwork. She stated she cannot be off for 2 months due to financial issues and would like to know if she he has to be off for 2 months or Can she return to work in 2 weeks ?      Per [place] paperwork , patient to be off work 4

## 2019-04-03 NOTE — TELEPHONE ENCOUNTER
Pt calling stating there's been a miscommunication between her & her HR dept, now needs to re-do the forms completely, pt asking for callback directly from nurses

## 2019-04-04 NOTE — TELEPHONE ENCOUNTER
Spoke with patient and informed paperwork has been completed and signed. Per dr Jose Freed , ok to return to work in 2 weeks. Patient to be off work 4/9/19- 4/23/19.      Patient scheduled for post op follow up 4/24/19     Faxed [facility] paperwork to Jens Marquez

## 2019-04-08 ENCOUNTER — ANESTHESIA EVENT (OUTPATIENT)
Dept: SURGERY | Facility: HOSPITAL | Age: 54
End: 2019-04-08
Payer: COMMERCIAL

## 2019-04-09 ENCOUNTER — ANESTHESIA (OUTPATIENT)
Dept: SURGERY | Facility: HOSPITAL | Age: 54
End: 2019-04-09
Payer: COMMERCIAL

## 2019-04-09 ENCOUNTER — HOSPITAL ENCOUNTER (OUTPATIENT)
Facility: HOSPITAL | Age: 54
Setting detail: HOSPITAL OUTPATIENT SURGERY
Discharge: HOME | End: 2019-04-09
Attending: NEUROLOGICAL SURGERY | Admitting: NEUROLOGICAL SURGERY
Payer: COMMERCIAL

## 2019-04-09 VITALS
TEMPERATURE: 98 F | RESPIRATION RATE: 18 BRPM | OXYGEN SATURATION: 100 % | SYSTOLIC BLOOD PRESSURE: 98 MMHG | DIASTOLIC BLOOD PRESSURE: 75 MMHG | HEIGHT: 67 IN | BODY MASS INDEX: 39.13 KG/M2 | WEIGHT: 249.31 LBS | HEART RATE: 58 BPM

## 2019-04-09 DIAGNOSIS — G56.02 CARPAL TUNNEL SYNDROME OF LEFT WRIST: ICD-10-CM

## 2019-04-09 PROCEDURE — 81025 URINE PREGNANCY TEST: CPT | Performed by: NEUROLOGICAL SURGERY

## 2019-04-09 PROCEDURE — 01N50ZZ RELEASE MEDIAN NERVE, OPEN APPROACH: ICD-10-PCS | Performed by: NEUROLOGICAL SURGERY

## 2019-04-09 RX ORDER — HYDROCODONE BITARTRATE AND ACETAMINOPHEN 5; 325 MG/1; MG/1
1 TABLET ORAL AS NEEDED
Status: DISCONTINUED | OUTPATIENT
Start: 2019-04-09 | End: 2019-04-09

## 2019-04-09 RX ORDER — ONDANSETRON 2 MG/ML
4 INJECTION INTRAMUSCULAR; INTRAVENOUS AS NEEDED
Status: DISCONTINUED | OUTPATIENT
Start: 2019-04-09 | End: 2019-04-09

## 2019-04-09 RX ORDER — SODIUM CHLORIDE, SODIUM LACTATE, POTASSIUM CHLORIDE, CALCIUM CHLORIDE 600; 310; 30; 20 MG/100ML; MG/100ML; MG/100ML; MG/100ML
INJECTION, SOLUTION INTRAVENOUS CONTINUOUS
Status: DISCONTINUED | OUTPATIENT
Start: 2019-04-09 | End: 2019-04-09

## 2019-04-09 RX ORDER — MIDAZOLAM HYDROCHLORIDE 1 MG/ML
1 INJECTION INTRAMUSCULAR; INTRAVENOUS EVERY 5 MIN PRN
Status: DISCONTINUED | OUTPATIENT
Start: 2019-04-09 | End: 2019-04-09

## 2019-04-09 RX ORDER — MORPHINE SULFATE 4 MG/ML
2 INJECTION, SOLUTION INTRAMUSCULAR; INTRAVENOUS EVERY 5 MIN PRN
Status: DISCONTINUED | OUTPATIENT
Start: 2019-04-09 | End: 2019-04-09

## 2019-04-09 RX ORDER — HYDROCODONE BITARTRATE AND ACETAMINOPHEN 5; 325 MG/1; MG/1
1 TABLET ORAL EVERY 8 HOURS PRN
Qty: 10 TABLET | Refills: 0 | Status: ON HOLD | OUTPATIENT
Start: 2019-04-09 | End: 2019-05-23

## 2019-04-09 RX ORDER — ACETAMINOPHEN 500 MG
1000 TABLET ORAL ONCE AS NEEDED
Status: DISCONTINUED | OUTPATIENT
Start: 2019-04-09 | End: 2019-04-09

## 2019-04-09 RX ORDER — CLINDAMYCIN PHOSPHATE 900 MG/50ML
900 INJECTION INTRAVENOUS ONCE
Status: DISCONTINUED | OUTPATIENT
Start: 2019-04-09 | End: 2019-04-09 | Stop reason: HOSPADM

## 2019-04-09 RX ORDER — CLINDAMYCIN HYDROCHLORIDE 300 MG/1
600 CAPSULE ORAL 3 TIMES DAILY
Qty: 9 CAPSULE | Refills: 0 | Status: SHIPPED | OUTPATIENT
Start: 2019-04-09 | End: 2019-04-12

## 2019-04-09 RX ORDER — HYDROCODONE BITARTRATE AND ACETAMINOPHEN 5; 325 MG/1; MG/1
2 TABLET ORAL AS NEEDED
Status: DISCONTINUED | OUTPATIENT
Start: 2019-04-09 | End: 2019-04-09

## 2019-04-09 RX ORDER — MEPERIDINE HYDROCHLORIDE 25 MG/ML
12.5 INJECTION INTRAMUSCULAR; INTRAVENOUS; SUBCUTANEOUS AS NEEDED
Status: DISCONTINUED | OUTPATIENT
Start: 2019-04-09 | End: 2019-04-09

## 2019-04-09 RX ORDER — ACETAMINOPHEN 500 MG
1000 TABLET ORAL ONCE
Status: ON HOLD | COMMUNITY
End: 2019-04-09

## 2019-04-09 RX ORDER — SODIUM CHLORIDE 0.9 % (FLUSH) 0.9 %
SYRINGE (ML) INJECTION AS NEEDED
Status: DISCONTINUED | OUTPATIENT
Start: 2019-04-09 | End: 2019-04-09 | Stop reason: HOSPADM

## 2019-04-09 RX ORDER — ACETAMINOPHEN 500 MG
1000 TABLET ORAL ONCE
Status: DISCONTINUED | OUTPATIENT
Start: 2019-04-09 | End: 2019-04-09

## 2019-04-09 RX ORDER — LABETALOL HYDROCHLORIDE 5 MG/ML
5 INJECTION, SOLUTION INTRAVENOUS EVERY 5 MIN PRN
Status: DISCONTINUED | OUTPATIENT
Start: 2019-04-09 | End: 2019-04-09

## 2019-04-09 RX ORDER — CLINDAMYCIN PHOSPHATE 900 MG/50ML
INJECTION INTRAVENOUS
Status: DISCONTINUED | OUTPATIENT
Start: 2019-04-09 | End: 2019-04-09

## 2019-04-09 RX ORDER — HYDROMORPHONE HYDROCHLORIDE 1 MG/ML
0.4 INJECTION, SOLUTION INTRAMUSCULAR; INTRAVENOUS; SUBCUTANEOUS EVERY 5 MIN PRN
Status: DISCONTINUED | OUTPATIENT
Start: 2019-04-09 | End: 2019-04-09

## 2019-04-09 RX ORDER — BUPIVACAINE HYDROCHLORIDE 2.5 MG/ML
INJECTION, SOLUTION EPIDURAL; INFILTRATION; INTRACAUDAL AS NEEDED
Status: DISCONTINUED | OUTPATIENT
Start: 2019-04-09 | End: 2019-04-09 | Stop reason: HOSPADM

## 2019-04-09 RX ORDER — NALOXONE HYDROCHLORIDE 0.4 MG/ML
80 INJECTION, SOLUTION INTRAMUSCULAR; INTRAVENOUS; SUBCUTANEOUS AS NEEDED
Status: DISCONTINUED | OUTPATIENT
Start: 2019-04-09 | End: 2019-04-09

## 2019-04-09 NOTE — OPERATIVE REPORT
[facility]    OPERATIVE REPORT    Patient:  Arleen Heck;  YOB: 1965     CSN:  [id number]; Medical Record Number:  [id number]    Admission Date:  4/9/2019 Operation Date:  4/9/2019    . ..........................     Operating Physician vertical mattress sutures. The incision was dressed with bacitracin, telfa, fluffs, a loose kerlix wrap and a loose ace bandage wrap.  Once adequately awake from anesthesia motor strength and sensation were noted to be stable, the patient was moved back to

## 2019-04-09 NOTE — BRIEF OP NOTE
Pre-Operative Diagnosis: Carpal tunnel syndrome of left wrist [G56.02]     Post-Operative Diagnosis: Carpal tunnel syndrome of left wrist [G56.02]      Procedure Performed:   Procedure(s):  left carpal tunnel release    Surgeon(s) and Role:     Ivon Duarte,

## 2019-04-09 NOTE — H&P
Neurosurgery Pre-OP H&P  2019    Lynda Harden PCP:  Arin Galeas MD    1965 MRN [id number]          HISTORY OF PRESENT ILLNESS:  Lynda Harden is a(n) 48year old female s/p 18 L4-S1 extension, revision of fusion.  She has stable ba MD at [facility] MAIN OR   • POSTERIOR LUMBAR LAMINECT SPINAL FUS W/INSTR 1 LEV Bilateral 5/4/2018     Performed by Yola Bustos MD at [facility] MAIN OR   • REMOVAL GALLBLADDER       • REMOVAL OF TONSILS,12+ Y/O       • SACROILIAC JOINT INJECTION BILATERAL Bilateral 12 clonus, no hurtado's                Coordination: deferred                Gait: deferred  Positive Tinel's over bilateral wrists and forearms        DIAGNOSTIC DATA:        EMG with moderate left and mild right carpal tunnel syndrome     IMAGING:  none

## 2019-04-09 NOTE — ANESTHESIA PREPROCEDURE EVALUATION
PRE-OP EVALUATION    Patient Name: Lynda Harden    Pre-op Diagnosis: Carpal tunnel syndrome of left wrist [G56.02]    Procedure(s):  left carpal tunnel release    Surgeon(s) and Role:     Gabby Nelson MD - Primary    Pre-op vitals reviewed.         B • ANTERIOR CERVICAL FUSION BG & INST 3 LEVEL N/A 2017    Performed by Jany Hoffman MD at [facility] MAIN OR   •   X 1   • CHOLECYSTECTOMY     • EXCIS LUMBAR DISK,ONE LEVEL     • INTRAOPERATIVE NEURO MONITORING N/A 2017    Performed by Radhames Aguilar Results   Component Value Date    PT 9.7 03/23/2019    INR 0.9 03/23/2019         Airway      Mallampati: III  Mouth opening: >3 FB  TM distance: 4 - 6 cm  Neck ROM: full Cardiovascular    Cardiovascular exam normal.         Dental    No notable dental his

## 2019-04-22 ENCOUNTER — OFFICE VISIT (OUTPATIENT)
Dept: SURGERY | Facility: CLINIC | Age: 54
End: 2019-04-22

## 2019-04-22 ENCOUNTER — TELEPHONE (OUTPATIENT)
Dept: SURGERY | Facility: CLINIC | Age: 54
End: 2019-04-22

## 2019-04-22 VITALS
SYSTOLIC BLOOD PRESSURE: 122 MMHG | WEIGHT: 250 LBS | BODY MASS INDEX: 39.24 KG/M2 | DIASTOLIC BLOOD PRESSURE: 82 MMHG | HEIGHT: 67 IN | HEART RATE: 65 BPM

## 2019-04-22 DIAGNOSIS — G56.01 CARPAL TUNNEL SYNDROME OF RIGHT WRIST: Primary | ICD-10-CM

## 2019-04-22 DIAGNOSIS — G56.02 CARPAL TUNNEL SYNDROME OF LEFT WRIST: Primary | ICD-10-CM

## 2019-04-22 DIAGNOSIS — Z01.818 PRE-OP TESTING: Primary | ICD-10-CM

## 2019-04-22 DIAGNOSIS — G56.01 CARPAL TUNNEL SYNDROME OF RIGHT WRIST: ICD-10-CM

## 2019-04-22 PROCEDURE — 99024 POSTOP FOLLOW-UP VISIT: CPT | Performed by: NEUROLOGICAL SURGERY

## 2019-04-22 NOTE — PROGRESS NOTES
PT here for post op left carpal tunnel release. PT stated she removed her own sutures.   Has some off and on pain in hands, some numbness and tingling however very mild

## 2019-04-22 NOTE — PROGRESS NOTES
[facility]  Neurosurgery Clinic Visit      HISTORY OF PRESENT ILLNESS:  Elva Marquez is a(n) 48year old female s/p 4/9/19 L CTS release. Doing well, has minimal incisional pain.  Preop pain, n/t have improved greatly, notes only mild UNLISTED  2018    LUMBAR   • TONSILLECTOMY     • TRANSFORAMINAL LUMBAR EPIDURAL STEROID INJECTION MULTIPLE LEVEL Left 12/8/2016    Performed by Lazaro Brenner MD at [facility] MAIN OR   • [address] Left 4/9/2019    Performed by Magnolia Kaur, discuss with her employer and send paperwork    Mike Mckeon MD  Neurological Surgeon  [facility]  [address], [address], [address] [facility]

## 2019-04-23 NOTE — TELEPHONE ENCOUNTER
You are scheduled for right carpal tunnel release on 5/23/19 with     Pre-op instructions discussed with patient and surgical packet provided:    · You will need preoperative labs, the orders have been placed.   · No blood thinning medications, ov

## 2019-04-24 ENCOUNTER — TELEPHONE (OUTPATIENT)
Dept: SURGERY | Facility: CLINIC | Age: 54
End: 2019-04-24

## 2019-04-24 NOTE — TELEPHONE ENCOUNTER
Received MR melany for complete medical records related to work injury on 1/18/19. Sent to [employer]. Copy sent to scanning.

## 2019-04-24 NOTE — TELEPHONE ENCOUNTER
Pt dropped off FMLA forms, pt just seen so no $25.00 fee, endorsed to RNs for completion, pt declined a copy for herself, please fax to [facility] [facility] attn: Delma Angelucci @ [phone] when complete.

## 2019-04-30 RX ORDER — ACETAMINOPHEN 500 MG
1000 TABLET ORAL ONCE
Status: CANCELLED | OUTPATIENT
Start: 2019-04-30 | End: 2019-04-30

## 2019-04-30 RX ORDER — CLINDAMYCIN PHOSPHATE 900 MG/50ML
900 INJECTION INTRAVENOUS ONCE
Status: CANCELLED | OUTPATIENT
Start: 2019-04-30 | End: 2019-04-30

## 2019-04-30 RX ORDER — SODIUM CHLORIDE, SODIUM LACTATE, POTASSIUM CHLORIDE, CALCIUM CHLORIDE 600; 310; 30; 20 MG/100ML; MG/100ML; MG/100ML; MG/100ML
INJECTION, SOLUTION INTRAVENOUS CONTINUOUS
Status: CANCELLED | OUTPATIENT
Start: 2019-04-30

## 2019-05-01 NOTE — TELEPHONE ENCOUNTER
[facility] referral #[id number] initiated for carpal tunnel release surgery, pending review.

## 2019-05-02 ENCOUNTER — TELEPHONE (OUTPATIENT)
Dept: SURGERY | Facility: CLINIC | Age: 54
End: 2019-05-02

## 2019-05-02 NOTE — TELEPHONE ENCOUNTER
Spoke to patient, confirmed procedure date of 5/7/19 and to be checked in at outpatient registration at 8:15 am. Patient instructed to call pre-procedure line before procedure at [phone].  Patient instructed to call office if there are additional quest

## 2019-05-06 ENCOUNTER — TELEPHONE (OUTPATIENT)
Dept: PAIN CLINIC | Facility: CLINIC | Age: 54
End: 2019-05-06

## 2019-05-06 RX ORDER — CLINDAMYCIN HYDROCHLORIDE 300 MG/1
300 CAPSULE ORAL EVERY 6 HOURS
Qty: 40 CAPSULE | Refills: 0 | Status: SHIPPED | OUTPATIENT
Start: 2019-05-06 | End: 2019-05-13

## 2019-05-06 NOTE — TELEPHONE ENCOUNTER
Please call pt and notify oral antibiotics were sent to her pharmacy. She is to take the first dose Wednesday morning, and complete all 10 days. Medication: Clindamycin 300mg every 6 hours for 10 days.      Thank you

## 2019-05-07 ENCOUNTER — TELEPHONE (OUTPATIENT)
Dept: SURGERY | Facility: CLINIC | Age: 54
End: 2019-05-07

## 2019-05-07 ENCOUNTER — APPOINTMENT (OUTPATIENT)
Dept: GENERAL RADIOLOGY | Facility: HOSPITAL | Age: 54
End: 2019-05-07
Attending: ANESTHESIOLOGY
Payer: COMMERCIAL

## 2019-05-07 ENCOUNTER — HOSPITAL ENCOUNTER (OUTPATIENT)
Facility: HOSPITAL | Age: 54
Setting detail: HOSPITAL OUTPATIENT SURGERY
Discharge: HOME | End: 2019-05-07
Attending: ANESTHESIOLOGY | Admitting: ANESTHESIOLOGY
Payer: COMMERCIAL

## 2019-05-07 VITALS
DIASTOLIC BLOOD PRESSURE: 79 MMHG | TEMPERATURE: 98 F | OXYGEN SATURATION: 98 % | RESPIRATION RATE: 16 BRPM | SYSTOLIC BLOOD PRESSURE: 117 MMHG | HEART RATE: 62 BPM

## 2019-05-07 DIAGNOSIS — M96.1 FAILED BACK SURGICAL SYNDROME: ICD-10-CM

## 2019-05-07 PROCEDURE — 00HU3MZ INSERTION OF NEUROSTIMULATOR LEAD INTO SPINAL CANAL, PERCUTANEOUS APPROACH: ICD-10-PCS | Performed by: ANESTHESIOLOGY

## 2019-05-07 PROCEDURE — 99152 MOD SED SAME PHYS/QHP 5/>YRS: CPT | Performed by: ANESTHESIOLOGY

## 2019-05-07 PROCEDURE — 81025 URINE PREGNANCY TEST: CPT | Performed by: ANESTHESIOLOGY

## 2019-05-07 RX ORDER — SODIUM CHLORIDE, SODIUM LACTATE, POTASSIUM CHLORIDE, CALCIUM CHLORIDE 600; 310; 30; 20 MG/100ML; MG/100ML; MG/100ML; MG/100ML
INJECTION, SOLUTION INTRAVENOUS CONTINUOUS
Status: DISCONTINUED | OUTPATIENT
Start: 2019-05-07 | End: 2019-05-07

## 2019-05-07 RX ORDER — LIDOCAINE HYDROCHLORIDE 10 MG/ML
INJECTION, SOLUTION EPIDURAL; INFILTRATION; INTRACAUDAL; PERINEURAL AS NEEDED
Status: DISCONTINUED | OUTPATIENT
Start: 2019-05-07 | End: 2019-05-07

## 2019-05-07 RX ORDER — IBUPROFEN 200 MG
CAPSULE ORAL AS NEEDED
Status: DISCONTINUED | OUTPATIENT
Start: 2019-05-07 | End: 2019-05-07

## 2019-05-07 RX ORDER — DIPHENHYDRAMINE HYDROCHLORIDE 50 MG/ML
50 INJECTION INTRAMUSCULAR; INTRAVENOUS ONCE AS NEEDED
Status: DISCONTINUED | OUTPATIENT
Start: 2019-05-07 | End: 2019-05-07

## 2019-05-07 RX ORDER — CLINDAMYCIN PHOSPHATE 900 MG/50ML
900 INJECTION INTRAVENOUS ONCE
Status: COMPLETED | OUTPATIENT
Start: 2019-05-07 | End: 2019-05-07

## 2019-05-07 RX ORDER — ONDANSETRON 2 MG/ML
4 INJECTION INTRAMUSCULAR; INTRAVENOUS ONCE AS NEEDED
Status: DISCONTINUED | OUTPATIENT
Start: 2019-05-07 | End: 2019-05-07

## 2019-05-07 RX ORDER — SODIUM CHLORIDE, SODIUM LACTATE, POTASSIUM CHLORIDE, CALCIUM CHLORIDE 600; 310; 30; 20 MG/100ML; MG/100ML; MG/100ML; MG/100ML
100 INJECTION, SOLUTION INTRAVENOUS CONTINUOUS
Status: DISCONTINUED | OUTPATIENT
Start: 2019-05-07 | End: 2019-05-07

## 2019-05-07 RX ORDER — CLINDAMYCIN PHOSPHATE 900 MG/50ML
INJECTION INTRAVENOUS
Status: COMPLETED
Start: 2019-05-07 | End: 2019-05-07

## 2019-05-07 RX ORDER — ACETAMINOPHEN 500 MG
1000 TABLET ORAL ONCE
Status: DISCONTINUED | OUTPATIENT
Start: 2019-05-07 | End: 2019-05-07

## 2019-05-07 RX ORDER — CLINDAMYCIN HYDROCHLORIDE 150 MG/1
150 CAPSULE ORAL 3 TIMES DAILY
Qty: 30 CAPSULE | Refills: 0 | Status: SHIPPED | OUTPATIENT
Start: 2019-05-07 | End: 2019-05-17

## 2019-05-07 RX ORDER — MIDAZOLAM HYDROCHLORIDE 1 MG/ML
INJECTION INTRAMUSCULAR; INTRAVENOUS AS NEEDED
Status: DISCONTINUED | OUTPATIENT
Start: 2019-05-07 | End: 2019-05-07

## 2019-05-07 NOTE — H&P
History & Physical Examination    Patient Name: Guerita Álvarez  MRN: [id number]  [facility]: [id number]  YOB: 1965    Pre-Operative Diagnosis:  Failed back surgical syndrome [M96.1]    Present Illness: 71-year-old female patient with chronic low macarena Procedure Laterality Date   • ADDL NECK SPINE FUSION  07/11/2017    CERVICAL 4-CERVICAL 5, CERVICAL 5-CERVICAL 6, CERVICAL 6-CERVICAL 7 ANTERIOR DISCECTOMY AND FUSION WITH INSTRUMENTATION AND MICROSCOPE DISSECTIONN/AGeneral- Dr. Vannessa Hernandez   • [address] [facility] Used    Alcohol use: No      Comment: VERY RARELY MIGHT HAVE A MIMOSA      SYSTEM Check if Review is Normal Check if Physical Exam is Normal If not normal, please explain:   HEENT [x ] [x ]    NECK & BACK [x ] [x ]    HEART [x ] [x ]    LUNGS [x ] [x ]

## 2019-05-07 NOTE — OPERATIVE REPORT
[facility]  Operative Report  2019     Maddie Daniels [address] Patient Status:  Hospital Outpatient Surgery    1965 MRN [id number]   [facility] ENDOSCOPY Attending Elsi Jones MD   Hosp Day # 0 PCP Anand Naik MD     Indic with 1% lidocaine at the L3 level around the midline. A #11 blade was used to make a small skin incision. A 14-gauge Tuohy needle was inserted and directed to L2 intervertebral interlaminar space in the midline.   The needle was advanced under AP and late abandoning this option will be determined according to the patient's response during this week of trial.  The patient understood fully and was discharged in good condition. Complications: None. Follow up:  The patient will be followed in the pain cli

## 2019-05-07 NOTE — TELEPHONE ENCOUNTER
Dr. Scott Mckay leaving clinic [address]   moved appointment from 3:30 to 8:30am still on Dr. Scott Mckay schedule please call back to confirm ok?

## 2019-05-13 ENCOUNTER — OFFICE VISIT (OUTPATIENT)
Dept: PAIN CLINIC | Facility: CLINIC | Age: 54
End: 2019-05-13

## 2019-05-13 VITALS
HEIGHT: 67 IN | SYSTOLIC BLOOD PRESSURE: 126 MMHG | BODY MASS INDEX: 39.24 KG/M2 | WEIGHT: 250 LBS | OXYGEN SATURATION: 96 % | HEART RATE: 86 BPM | DIASTOLIC BLOOD PRESSURE: 80 MMHG

## 2019-05-13 DIAGNOSIS — M96.1 FAILED BACK SURGICAL SYNDROME: Primary | ICD-10-CM

## 2019-05-13 PROCEDURE — 99214 OFFICE O/P EST MOD 30 MIN: CPT | Performed by: ANESTHESIOLOGY

## 2019-05-13 NOTE — PROGRESS NOTES
Patient here c/o lead pull from SCS trial.     Patient reports 80% relief experienced from trial.     Current pain level = 3/10. Last reported dose of norco, today, approximately 1440. Last reported dose of tramadol approximately 2 weeks ago.      In ro

## 2019-05-13 NOTE — PROGRESS NOTES
Name: Dena Sellers   : 1965   DOS: 2019     Pain Clinic Follow Up Visit:   Dena Sellers is a 48year old female who presents for recheck of her chronic low back and neck pain.   The patient is status post spinal cord stimulator trial.  Th permanent implantation. The patient is already scheduled with Dr. Shawn Barajas. Orders:No orders of the defined types were placed in this encounter.       Medications filled today:  Requested Prescriptions      No prescriptions requested or ordered in this e

## 2019-05-15 ENCOUNTER — OFFICE VISIT (OUTPATIENT)
Dept: SURGERY | Facility: CLINIC | Age: 54
End: 2019-05-15

## 2019-05-15 VITALS — SYSTOLIC BLOOD PRESSURE: 124 MMHG | HEART RATE: 86 BPM | DIASTOLIC BLOOD PRESSURE: 74 MMHG

## 2019-05-15 DIAGNOSIS — Z98.890 S/P CARPAL TUNNEL RELEASE: Primary | ICD-10-CM

## 2019-05-15 PROCEDURE — 99024 POSTOP FOLLOW-UP VISIT: CPT | Performed by: NEUROLOGICAL SURGERY

## 2019-05-15 NOTE — PROGRESS NOTES
[facility]  Neurosurgery Clinic Visit      HISTORY OF PRESENT ILLNESS:  Jesus Leary is a(n) 48year old female s/p multiple spine surgeries, most recently s/p left CTS release.  Doing well, has decreased pain and numbness in her hand Performed by David Elizondo MD at [address] [place] [address]   • SACROILIAC JOINT INJECTION RIGHT OR LEFT Left 12/15/2016    Performed by David Elizondo MD at [facility] MAIN OR   • SPINAL CORD STIMULATION TRIAL N/A 5/7/2019    Performed by David Elizondo MD at [address] well       DIAGNOSTIC DATA:      IMAGING:  SCS trial x-rays reviewed    ASSESSMENT:  47 yo F s/p 4/9/19 L CTS release, recent SCS trial.  She is scheduled for a consult with Dr. Meryle Nailer tomorrow for SCS placement.  Discussed that she would need to be off for

## 2019-05-16 ENCOUNTER — TELEPHONE (OUTPATIENT)
Dept: SURGERY | Facility: CLINIC | Age: 54
End: 2019-05-16

## 2019-05-20 ENCOUNTER — TELEPHONE (OUTPATIENT)
Dept: SURGERY | Facility: CLINIC | Age: 54
End: 2019-05-20

## 2019-05-20 DIAGNOSIS — M96.1 FAILED BACK SYNDROME: Primary | ICD-10-CM

## 2019-05-20 NOTE — TELEPHONE ENCOUNTER
Spoke with 's , Roma and  to discuss surgery date. Date being held for 6/7/19. Fabiola Burton was also ok with the above date.      Patient needs to be scheduled for surgery downtown at [facility] with [facility] for SCS placeme

## 2019-05-22 ENCOUNTER — ANESTHESIA EVENT (OUTPATIENT)
Dept: SURGERY | Facility: HOSPITAL | Age: 54
End: 2019-05-22
Payer: COMMERCIAL

## 2019-05-22 NOTE — TELEPHONE ENCOUNTER
Spoke with Malina Carpenter who stated he wanted patient to wait at lease 6 weeks between the CTR surgery and the SCS surgery. Patient will be out of time off with her work so she will be postponing SCS surgery until next year.     Spoke with Eliezer's sched

## 2019-05-23 ENCOUNTER — HOSPITAL ENCOUNTER (OUTPATIENT)
Facility: HOSPITAL | Age: 54
Setting detail: HOSPITAL OUTPATIENT SURGERY
Discharge: HOME | End: 2019-05-23
Attending: NEUROLOGICAL SURGERY | Admitting: NEUROLOGICAL SURGERY
Payer: COMMERCIAL

## 2019-05-23 ENCOUNTER — ANESTHESIA (OUTPATIENT)
Dept: SURGERY | Facility: HOSPITAL | Age: 54
End: 2019-05-23
Payer: COMMERCIAL

## 2019-05-23 VITALS
WEIGHT: 240.06 LBS | RESPIRATION RATE: 16 BRPM | SYSTOLIC BLOOD PRESSURE: 112 MMHG | DIASTOLIC BLOOD PRESSURE: 72 MMHG | BODY MASS INDEX: 37.68 KG/M2 | OXYGEN SATURATION: 100 % | TEMPERATURE: 97 F | HEIGHT: 67 IN | HEART RATE: 53 BPM

## 2019-05-23 DIAGNOSIS — G56.01 CARPAL TUNNEL SYNDROME OF RIGHT WRIST: ICD-10-CM

## 2019-05-23 PROCEDURE — 81025 URINE PREGNANCY TEST: CPT | Performed by: NEUROLOGICAL SURGERY

## 2019-05-23 PROCEDURE — 01N50ZZ RELEASE MEDIAN NERVE, OPEN APPROACH: ICD-10-PCS | Performed by: NEUROLOGICAL SURGERY

## 2019-05-23 RX ORDER — HYDROMORPHONE HYDROCHLORIDE 1 MG/ML
0.4 INJECTION, SOLUTION INTRAMUSCULAR; INTRAVENOUS; SUBCUTANEOUS EVERY 5 MIN PRN
Status: DISCONTINUED | OUTPATIENT
Start: 2019-05-23 | End: 2019-05-23

## 2019-05-23 RX ORDER — NALOXONE HYDROCHLORIDE 0.4 MG/ML
80 INJECTION, SOLUTION INTRAMUSCULAR; INTRAVENOUS; SUBCUTANEOUS AS NEEDED
Status: DISCONTINUED | OUTPATIENT
Start: 2019-05-23 | End: 2019-05-23

## 2019-05-23 RX ORDER — MIDAZOLAM HYDROCHLORIDE 1 MG/ML
1 INJECTION INTRAMUSCULAR; INTRAVENOUS EVERY 5 MIN PRN
Status: DISCONTINUED | OUTPATIENT
Start: 2019-05-23 | End: 2019-05-23

## 2019-05-23 RX ORDER — DEXAMETHASONE SODIUM PHOSPHATE 4 MG/ML
4 VIAL (ML) INJECTION AS NEEDED
Status: DISCONTINUED | OUTPATIENT
Start: 2019-05-23 | End: 2019-05-23

## 2019-05-23 RX ORDER — SODIUM CHLORIDE, SODIUM LACTATE, POTASSIUM CHLORIDE, CALCIUM CHLORIDE 600; 310; 30; 20 MG/100ML; MG/100ML; MG/100ML; MG/100ML
INJECTION, SOLUTION INTRAVENOUS CONTINUOUS
Status: DISCONTINUED | OUTPATIENT
Start: 2019-05-23 | End: 2019-05-23

## 2019-05-23 RX ORDER — BUPIVACAINE HYDROCHLORIDE 2.5 MG/ML
INJECTION, SOLUTION EPIDURAL; INFILTRATION; INTRACAUDAL AS NEEDED
Status: DISCONTINUED | OUTPATIENT
Start: 2019-05-23 | End: 2019-05-23 | Stop reason: HOSPADM

## 2019-05-23 RX ORDER — METOCLOPRAMIDE HYDROCHLORIDE 5 MG/ML
10 INJECTION INTRAMUSCULAR; INTRAVENOUS AS NEEDED
Status: DISCONTINUED | OUTPATIENT
Start: 2019-05-23 | End: 2019-05-23

## 2019-05-23 RX ORDER — DIPHENHYDRAMINE HYDROCHLORIDE 50 MG/ML
12.5 INJECTION INTRAMUSCULAR; INTRAVENOUS AS NEEDED
Status: DISCONTINUED | OUTPATIENT
Start: 2019-05-23 | End: 2019-05-23

## 2019-05-23 RX ORDER — ACETAMINOPHEN 500 MG
1000 TABLET ORAL ONCE
Status: DISCONTINUED | OUTPATIENT
Start: 2019-05-23 | End: 2019-05-23 | Stop reason: HOSPADM

## 2019-05-23 RX ORDER — MEPERIDINE HYDROCHLORIDE 25 MG/ML
12.5 INJECTION INTRAMUSCULAR; INTRAVENOUS; SUBCUTANEOUS AS NEEDED
Status: DISCONTINUED | OUTPATIENT
Start: 2019-05-23 | End: 2019-05-23

## 2019-05-23 RX ORDER — ACETAMINOPHEN 500 MG
1000 TABLET ORAL ONCE
COMMUNITY
End: 2019-06-07 | Stop reason: ALTCHOICE

## 2019-05-23 RX ORDER — HYDROCODONE BITARTRATE AND ACETAMINOPHEN 5; 325 MG/1; MG/1
2 TABLET ORAL AS NEEDED
Status: DISCONTINUED | OUTPATIENT
Start: 2019-05-23 | End: 2019-05-23

## 2019-05-23 RX ORDER — ONDANSETRON 2 MG/ML
4 INJECTION INTRAMUSCULAR; INTRAVENOUS AS NEEDED
Status: DISCONTINUED | OUTPATIENT
Start: 2019-05-23 | End: 2019-05-23

## 2019-05-23 RX ORDER — HYDROCODONE BITARTRATE AND ACETAMINOPHEN 5; 325 MG/1; MG/1
1 TABLET ORAL AS NEEDED
Status: DISCONTINUED | OUTPATIENT
Start: 2019-05-23 | End: 2019-05-23

## 2019-05-23 RX ORDER — CLINDAMYCIN PHOSPHATE 900 MG/50ML
900 INJECTION INTRAVENOUS ONCE
Status: COMPLETED | OUTPATIENT
Start: 2019-05-23 | End: 2019-05-23

## 2019-05-23 RX ORDER — HYDROCODONE BITARTRATE AND ACETAMINOPHEN 5; 325 MG/1; MG/1
1 TABLET ORAL EVERY 8 HOURS PRN
Qty: 10 TABLET | Refills: 0 | Status: SHIPPED | OUTPATIENT
Start: 2019-05-23 | End: 2020-09-16 | Stop reason: ALTCHOICE

## 2019-05-23 RX ORDER — LABETALOL HYDROCHLORIDE 5 MG/ML
5 INJECTION, SOLUTION INTRAVENOUS EVERY 5 MIN PRN
Status: DISCONTINUED | OUTPATIENT
Start: 2019-05-23 | End: 2019-05-23

## 2019-05-23 NOTE — BRIEF OP NOTE
Pre-Operative Diagnosis: Carpal tunnel syndrome of right wrist [G56.01]     Post-Operative Diagnosis: Carpal tunnel syndrome of right wrist [G56.01]      Procedure Performed:   Procedure(s):  right carpal tunnel release    Surgeon(s) and Role:     * Jayro Johnson

## 2019-05-23 NOTE — ANESTHESIA POSTPROCEDURE EVALUATION
[address] [facility] Patient Status:  Hospital Outpatient Surgery   Age/Gender 48year old female MRN [id number]   [facility] SURGERY Attending Ligia Maguire MD   Hosp Day # 0 PCP Adalid Horn MD       Anesthesia Post-op No

## 2019-05-23 NOTE — H&P
Neurosurgery Pre-OP H&P  2019    Lynda Dereck PCP:  Arin Galeas MD    1965 MRN [id number]       CC:  Right CTS    HISTORY OF PRESENT ILLNESS:  Lynda Harden is a(n) 48year old female s/p multiple spine surgeries, most recently s/p l SPINAL FUS W/INSTR 1 LEV Bilateral 5/4/2018     Performed by Layla Rodríguez MD at [facility] 7 Bilateral 12/4/2018     Performed by Tia Vazquez MD at [facility] MAIN OR   • SACROILIAC JOINT INJECTION RIGHT OR LEFT Left and reactive to light. EOMs intact. Face is symmetrical and sensation is intact. Tongue is midline.                   Motor: 5/5 x 4                Sensation: intact to light touch bilaterally                  Reflexes: 1+, no clonus, no hurtado's

## 2019-05-23 NOTE — OPERATIVE REPORT
[facility]    OPERATIVE REPORT    Patient:  Lior Matthews;  YOB: 1965     CSN:  [id number]; Medical Record Number:  [id number]    Admission Date:  5/23/2019 Operation Date:  5/23/2019    . ..........................     Operating Physici nylon vertical mattress sutures. The incision was dressed with bacitracin, telfa, fluffs, a loose kerlix wrap and a loose ace bandage wrap.  Once adequately awake from anesthesia motor strength and sensation were noted to be stable, the patient was moved b

## 2019-05-23 NOTE — ANESTHESIA PREPROCEDURE EVALUATION
PRE-OP EVALUATION    Patient Name: Dario Gonzalez    Pre-op Diagnosis: Carpal tunnel syndrome of right wrist [G56.01]    Procedure(s):  right carpal tunnel release    Surgeon(s) and Role:     Tito Jimenes MD - Primary    Pre-op vitals reviewed.   Temp: Neuro/Psych    Negative neuro/psych ROS.                           Patient Active Problem List:     IBS (irritable bowel syndrome)     Low back pain     Failed back syndrome, lumbosacral     Preop testing     Displacement of cervical intervertebral disc 2019    Performed by Holger Butler MD at [facility] MAIN OR     Social History    Tobacco Use      Smoking status: Former Smoker        Packs/day: 0.50        Years: 30.00        Pack years: 13        Quit date: 3/1/2018        Years since quittin.2

## 2019-05-28 ENCOUNTER — TELEPHONE (OUTPATIENT)
Dept: SURGERY | Facility: CLINIC | Age: 54
End: 2019-05-28

## 2019-05-28 NOTE — TELEPHONE ENCOUNTER
lis Olmos's report-musculoskeletal to be completed (4 pages including fax cover). NO CHARGE pt had sx 5/23/19.  Endorsed to Dr China Angelesate

## 2019-06-07 ENCOUNTER — OFFICE VISIT (OUTPATIENT)
Dept: SURGERY | Facility: CLINIC | Age: 54
End: 2019-06-07

## 2019-06-07 VITALS — DIASTOLIC BLOOD PRESSURE: 72 MMHG | HEART RATE: 84 BPM | SYSTOLIC BLOOD PRESSURE: 126 MMHG

## 2019-06-07 DIAGNOSIS — M96.1 FAILED BACK SYNDROME: ICD-10-CM

## 2019-06-07 DIAGNOSIS — Z98.890 S/P CARPAL TUNNEL RELEASE: Primary | ICD-10-CM

## 2019-06-07 PROCEDURE — 99024 POSTOP FOLLOW-UP VISIT: CPT | Performed by: PHYSICIAN ASSISTANT

## 2019-06-07 RX ORDER — CYCLOBENZAPRINE HCL 10 MG
10 TABLET ORAL NIGHTLY
Qty: 90 TABLET | Refills: 2 | Status: SHIPPED | OUTPATIENT
Start: 2019-06-07 | End: 2020-03-24

## 2019-06-07 NOTE — PROGRESS NOTES
Neurosurgery Clinic Visit  2019    Shane Campbell PCP:  Paul Green MD    1965 MRN [id number]       CC:  S/P R CTR 19    HPI:    Connie Burleson is here for 2 week post op. Her right hand tingling is improved.   She still has some discomfort i INJECTION Bilateral 12/13/2018     Performed by Dru Patterson MD at [facility]   • LUMBAR FACET INJECTION Left 12/22/2016     Performed by Dru Patterson MD at [facility] MAIN OR   • OTHER   04/09/2019     left carpal tunnel release   • POSTERIOR LUMBAR PARISH 06/07/19  0936   BP: 126/72   Pulse: 84     GENERAL:  Patient is a 48year old female in no acute distress. NEUROLOGICAL:                  Cognition: alert and oriented x 3                Cranial nerves: Pupils equally round and reactive to light.  EOMs in

## 2019-06-10 ENCOUNTER — TELEPHONE (OUTPATIENT)
Dept: SURGERY | Facility: CLINIC | Age: 54
End: 2019-06-10

## 2019-06-10 NOTE — TELEPHONE ENCOUNTER
Attending physician form, shows return to work 6/3/19, but going back to work on 6/27/19. So [employer] is faxing the other two pages she did not give us.

## 2019-06-10 NOTE — TELEPHONE ENCOUNTER
rcvd 4 pages including cover sheet to be completed-Ortho/neuro questionnaire.  Endorsed to Dr Jayjay Delgado

## 2019-06-17 ENCOUNTER — TELEPHONE (OUTPATIENT)
Dept: SURGERY | Facility: CLINIC | Age: 54
End: 2019-06-17

## 2019-06-17 NOTE — TELEPHONE ENCOUNTER
Pt calling questioning letter for return to work; conflicting info on letter, pt unable to submit to her employer, please advise, pt needs updated letter posted to [address]

## 2019-06-17 NOTE — TELEPHONE ENCOUNTER
Needs letter for work done 06/07/2019 changed, no boxes have been check off and just needs clarification. Thank you.   Will call patient back

## 2019-06-25 ENCOUNTER — OFFICE VISIT (OUTPATIENT)
Dept: OCCUPATIONAL MEDICINE | Age: 54
End: 2019-06-25
Attending: NEUROLOGICAL SURGERY
Payer: COMMERCIAL

## 2019-06-25 DIAGNOSIS — Z98.890 S/P CARPAL TUNNEL RELEASE: ICD-10-CM

## 2019-06-25 PROCEDURE — 97035 APP MDLTY 1+ULTRASOUND EA 15: CPT

## 2019-06-25 PROCEDURE — 97140 MANUAL THERAPY 1/> REGIONS: CPT

## 2019-06-25 PROCEDURE — 97110 THERAPEUTIC EXERCISES: CPT

## 2019-06-25 NOTE — PROGRESS NOTES
OCCUPATIONAL THERAPY UPPER EXTREMITY EVALUATION   Referring Physician: Dr. Bill Joseph  Diagnosis: s/p carpal tunnel release     Date of Service: 6/25/2019     PATIENT SUMMARY   Gayathri Salazar is a 48year old female who presents to therapy today with compla Precautions: Drug Allergy  OBJECTIVE    OBSERVATION: Unremarkable     ORTHOTICS: none    SCAR: Flexible     SENSORY: WNL overall, however very occasionally    CIRCUMFERENTIAL EDEMA (cm):  Right Wrist crease: 18.7  Left Wrist crease: 18.2  Right MCP: 20 Program instruction and Modalities to include: Ultrasound, [facility]    Education or treatment limitation: None  Rehab Potential:good    Patient/Family/Caregiver was advised of these findings, precautions, and treatment options and has agreed to actively particip

## 2019-07-01 ENCOUNTER — OFFICE VISIT (OUTPATIENT)
Dept: OCCUPATIONAL MEDICINE | Age: 54
End: 2019-07-01
Attending: NEUROLOGICAL SURGERY
Payer: COMMERCIAL

## 2019-07-01 PROCEDURE — 97035 APP MDLTY 1+ULTRASOUND EA 15: CPT

## 2019-07-01 PROCEDURE — 97110 THERAPEUTIC EXERCISES: CPT

## 2019-07-01 PROCEDURE — 97140 MANUAL THERAPY 1/> REGIONS: CPT

## 2019-07-01 NOTE — PROGRESS NOTES
Dx: Carpal tunnel release, bilateral         Insurance (Authorized # of Visits):  3           Authorizing Physician: Dr. Michael Sigala  Next MD visit: none scheduled  Fall Risk: standard         Precautions: n/a             Subjective: \"I was very bad about doi

## 2019-07-02 ENCOUNTER — TELEPHONE (OUTPATIENT)
Dept: SURGERY | Facility: CLINIC | Age: 54
End: 2019-07-02

## 2019-07-02 DIAGNOSIS — Z98.890 S/P CARPAL TUNNEL RELEASE: Primary | ICD-10-CM

## 2019-07-08 ENCOUNTER — OFFICE VISIT (OUTPATIENT)
Dept: OCCUPATIONAL MEDICINE | Age: 54
End: 2019-07-08
Attending: NEUROLOGICAL SURGERY
Payer: COMMERCIAL

## 2019-07-08 PROCEDURE — 97140 MANUAL THERAPY 1/> REGIONS: CPT

## 2019-07-08 PROCEDURE — 97110 THERAPEUTIC EXERCISES: CPT

## 2019-07-08 PROCEDURE — 97035 APP MDLTY 1+ULTRASOUND EA 15: CPT

## 2019-07-08 NOTE — PROGRESS NOTES
Dx: Carpal tunnel release, bilateral         Insurance (Authorized # of Visits):  3           Authorizing Physician: Dr. Ivis Lane  Next MD visit: none scheduled  Fall Risk: standard         Precautions: n/a             Subjective: Pt is not experiencing van

## 2019-07-11 ENCOUNTER — OFFICE VISIT (OUTPATIENT)
Dept: OCCUPATIONAL MEDICINE | Age: 54
End: 2019-07-11
Attending: NEUROLOGICAL SURGERY
Payer: COMMERCIAL

## 2019-07-11 DIAGNOSIS — Z98.890 S/P CARPAL TUNNEL RELEASE: ICD-10-CM

## 2019-07-11 PROCEDURE — 97035 APP MDLTY 1+ULTRASOUND EA 15: CPT

## 2019-07-11 PROCEDURE — 97140 MANUAL THERAPY 1/> REGIONS: CPT

## 2019-07-11 PROCEDURE — 97110 THERAPEUTIC EXERCISES: CPT

## 2019-07-11 NOTE — PROGRESS NOTES
Dx: Carpal tunnel release, bilateral         Insurance (Authorized # of Visits):  3           Authorizing Physician: Dr. Sima Beckett  Next MD visit: none scheduled  Fall Risk: standard         Precautions: n/a             Subjective: \"I had a little numbness HEP: HEP upgrades in bold    Charges: 1 TE, 1 MT, 1 US       Total Timed Treatment: 45 min  Total Treatment Time: 45 min

## 2019-07-17 ENCOUNTER — OFFICE VISIT (OUTPATIENT)
Dept: PAIN CLINIC | Facility: CLINIC | Age: 54
End: 2019-07-17

## 2019-07-17 ENCOUNTER — OFFICE VISIT (OUTPATIENT)
Dept: SURGERY | Facility: CLINIC | Age: 54
End: 2019-07-17

## 2019-07-17 VITALS
HEART RATE: 80 BPM | WEIGHT: 240 LBS | SYSTOLIC BLOOD PRESSURE: 120 MMHG | RESPIRATION RATE: 18 BRPM | DIASTOLIC BLOOD PRESSURE: 80 MMHG | BODY MASS INDEX: 38 KG/M2

## 2019-07-17 VITALS — SYSTOLIC BLOOD PRESSURE: 124 MMHG | HEART RATE: 78 BPM | DIASTOLIC BLOOD PRESSURE: 80 MMHG

## 2019-07-17 DIAGNOSIS — M79.2 NEUROPATHIC PAIN: Primary | ICD-10-CM

## 2019-07-17 DIAGNOSIS — Z98.890 HISTORY OF CERVICAL SPINAL SURGERY: ICD-10-CM

## 2019-07-17 DIAGNOSIS — Z98.890 HISTORY OF LUMBAR SURGERY: ICD-10-CM

## 2019-07-17 DIAGNOSIS — M96.1 FAILED BACK SURGICAL SYNDROME: ICD-10-CM

## 2019-07-17 DIAGNOSIS — Z98.890 S/P CARPAL TUNNEL RELEASE: Primary | ICD-10-CM

## 2019-07-17 DIAGNOSIS — Z98.890 HISTORY OF CARPAL TUNNEL RELEASE OF BOTH WRISTS: ICD-10-CM

## 2019-07-17 DIAGNOSIS — L29.9 ITCHING: ICD-10-CM

## 2019-07-17 PROCEDURE — 99024 POSTOP FOLLOW-UP VISIT: CPT | Performed by: NEUROLOGICAL SURGERY

## 2019-07-17 PROCEDURE — 99213 OFFICE O/P EST LOW 20 MIN: CPT | Performed by: NURSE PRACTITIONER

## 2019-07-17 RX ORDER — AMITRIPTYLINE HYDROCHLORIDE 25 MG/1
25 TABLET, FILM COATED ORAL NIGHTLY
Qty: 30 TABLET | Refills: 0 | Status: SHIPPED | OUTPATIENT
Start: 2019-07-17 | End: 2019-08-16

## 2019-07-17 NOTE — PROGRESS NOTES
Patient here to discuss symptoms of painful itching on both arms and legs on and off going on for the past 6 months and worsening.

## 2019-07-17 NOTE — PROGRESS NOTES
[facility]  Neurosurgery Clinic Visit      HISTORY OF PRESENT ILLNESS:  Meredith Daniel is a(n) 48year old female who returns s/p 5/23/19 R CTS release. She is doing well, has no pain, just occasional tingling in her fingertips.  She we • SACROILIAC JOINT INJECTION RIGHT OR LEFT Left 12/15/2016    Performed by David Elizondo MD at [facility] MAIN OR   • SPINAL CORD STIMULATION TRIAL N/A 5/7/2019    Performed by David Elizondo MD at [address] [place] [address] Subtext  [facility] hurtado's   Coordination: deferred   Gait: deferred  Bilateral carpal tunnel incisions well-healed       DIAGNOSTIC DATA:      IMAGING:  No new imaging    ASSESSMENT:  47 yo F s/p bilateral carpal tunnel release, most recently the right, doing well.  Chroni

## 2019-07-17 NOTE — PROGRESS NOTES
Pt is here for post op  right carpal tunnel release 5/23/19     PT/OT:  Pt states that she has started OT. Pt states that she has bene doing well with OT.

## 2019-07-17 NOTE — PROGRESS NOTES
Name: Derrick Hummel   : 1965   DOS: 2019     Pain Clinic Follow Up Visit:   Derrick Hummel is a 48year old female who presents for recheck of her chronic pain.  Today, she states she is here to consult regarding painful itching of her upper cervical and lumbar surgery. Neurologic: Denies weakness or bowel/bladder incontinence associated with the pain. Status post recent left carpal tunnel release 8 weeks ago with Dr. Lamberto Carranza in neurosurgery, for which she is following up today.  She had righ years ago. The patient has worsening low back pain with spasm numbness, tingling, and weakness radiating   to the left lower extremity. CONTRAST USED:  20  cc of paramagnetic gadolinium-based contrast was injected intravenously.      FINDINGS:    Redem stenosis. There is mild to moderate bilateral neural foraminal   stenosis. No significant interval change.         =====  CONCLUSION:       1.  Stable postoperative changes spinal fusion at L5-S1.   There are stable L5 pars defects with grade 1 anterolisthe no significant abnormality. C4-5:  At the fused level, there is no significant spinal canal or neural foraminal stenosis. C5-6:  At the fused level, there is no significant spinal canal or neural foraminal stenosis.      C6-7:  At the fused level, t and benefits of the medications were discussed with patient today. Patient should contact our office if this medication is not sufficient to help manage pain or if she begins to experience side effects.     Follow up with neurosurgery department as recommen

## 2019-07-22 ENCOUNTER — OFFICE VISIT (OUTPATIENT)
Dept: OCCUPATIONAL MEDICINE | Age: 54
End: 2019-07-22
Attending: NEUROLOGICAL SURGERY
Payer: COMMERCIAL

## 2019-07-22 PROCEDURE — 97035 APP MDLTY 1+ULTRASOUND EA 15: CPT

## 2019-07-22 PROCEDURE — 97140 MANUAL THERAPY 1/> REGIONS: CPT

## 2019-07-22 PROCEDURE — 97110 THERAPEUTIC EXERCISES: CPT

## 2019-07-25 ENCOUNTER — OFFICE VISIT (OUTPATIENT)
Dept: OCCUPATIONAL MEDICINE | Age: 54
End: 2019-07-25
Attending: NEUROLOGICAL SURGERY
Payer: COMMERCIAL

## 2019-07-25 PROCEDURE — 97110 THERAPEUTIC EXERCISES: CPT

## 2019-07-25 PROCEDURE — 97035 APP MDLTY 1+ULTRASOUND EA 15: CPT

## 2019-07-25 PROCEDURE — 97140 MANUAL THERAPY 1/> REGIONS: CPT

## 2019-07-25 NOTE — PROGRESS NOTES
Dx: Carpal tunnel release, bilateral         Insurance (Authorized # of Visits):  3           Authorizing Physician: Dr. Sam Hull  Next MD visit: none scheduled  Fall Risk: standard         Precautions: n/a             Subjective: \"Feels really good.   I'm j bar bend x 20  bilaterally PREs  3 lb bilaterally  -wrist flexion x 20  -wrist extension x 20  -RD/UD x 20   Pink putty exercises bilaterally  -/reposition 3 min   -roll/tip pinch   Digiflex green full  x 20 each hand  -red each digit x 10 each santiago

## 2019-07-29 ENCOUNTER — OFFICE VISIT (OUTPATIENT)
Dept: OCCUPATIONAL MEDICINE | Age: 54
End: 2019-07-29
Attending: NEUROLOGICAL SURGERY
Payer: COMMERCIAL

## 2019-07-29 PROCEDURE — 97140 MANUAL THERAPY 1/> REGIONS: CPT

## 2019-07-29 PROCEDURE — 97110 THERAPEUTIC EXERCISES: CPT

## 2019-07-29 NOTE — PROGRESS NOTES
Dx: Carpal tunnel release, bilateral         Insurance (Authorized # of Visits):  3           Authorizing Physician: Dr. Aj Valentin  Next MD visit: none scheduled  Fall Risk: standard         Precautions: n/a             Subjective: \"I will get some pain her bar  -wrist flexion x 25  -wrist extension x 25  -bar bend x 25  -reverse bar bend x 25  bilaterally   Tendon glides  A/AAROM to wrist Beige flex bar  -wrist flexion x 20  -wrist extension x 20  -bar bend x 20  -reverse bar bend x 20  bilaterally PREs  2 l

## 2019-08-01 ENCOUNTER — OFFICE VISIT (OUTPATIENT)
Dept: OCCUPATIONAL MEDICINE | Age: 54
End: 2019-08-01
Attending: NEUROLOGICAL SURGERY
Payer: COMMERCIAL

## 2019-08-01 PROCEDURE — 97110 THERAPEUTIC EXERCISES: CPT

## 2019-08-01 PROCEDURE — 97140 MANUAL THERAPY 1/> REGIONS: CPT

## 2019-08-01 NOTE — PROGRESS NOTES
Dx: Carpal tunnel release, bilateral         Insurance (Authorized # of Visits):  3           Authorizing Physician: Dr. Reva Ellington  Next MD visit: none scheduled  Fall Risk: standard         Precautions: n/a              Discharge Summary  Pt has attended 8 treatment and while under my care.     X___________________________________________________ Date____________________    Certification From: 0/3/7373  To:10/30/2019   Date: 7/1/2019  TX#: 2/3 Date:  7/8/19               TX#: 3/3 Date:   7/11/19 clothespins tripod pinch to place, lateral pinch to remove (each hand) Wall weight pulley  -biceps curl (bilateral) 15 lb x 20  -triceps ext (bilateral) 23 lb x 20  -scapular retraction x 20 Review HEP      In-hand manipulation and fmC with small chips  Wa

## 2019-10-08 ENCOUNTER — TELEPHONE (OUTPATIENT)
Dept: SURGERY | Facility: CLINIC | Age: 54
End: 2019-10-08

## 2019-10-08 NOTE — TELEPHONE ENCOUNTER
LMTCB       Per OV 7/17/19     ASSESSMENT AND PLAN:   Neuropathic pain  (primary encounter diagnosis)  Itching  Failed back surgical syndrome  History of lumbar surgery  History of cervical spinal surgery  History of carpal tunnel release of both wrists

## 2019-10-10 NOTE — TELEPHONE ENCOUNTER
Spoke with patient. She stated she was checking on the PA status on her SCS permanent placement . Asking if PA has  or will  soon. See TE 19 - Schedule surgery     Also asking when can she get it done ?  Wondering if placement can

## 2019-10-10 NOTE — TELEPHONE ENCOUNTER
[address] [facility]       Called patient regarding below TE from [facility]   We still do not have authorization from [facility] to perform spinal cord STIMS, no estimated time available to give patient in terms of when we will have authorization, and NO guara

## 2019-10-22 PROBLEM — Z86.0100 PERSONAL HISTORY OF COLONIC POLYPS: Status: ACTIVE | Noted: 2019-10-22

## 2019-10-22 PROBLEM — K63.5 POLYP OF COLON: Status: ACTIVE | Noted: 2019-10-22

## 2019-10-22 PROBLEM — K57.30 DIVERTICULOSIS OF LARGE INTESTINE WITHOUT PERFORATION OR ABSCESS WITHOUT BLEEDING: Status: ACTIVE | Noted: 2019-10-22

## 2019-10-22 PROBLEM — Z86.010 PERSONAL HISTORY OF COLONIC POLYPS: Status: ACTIVE | Noted: 2019-10-22

## 2019-12-09 ENCOUNTER — TELEPHONE (OUTPATIENT)
Dept: SURGERY | Facility: CLINIC | Age: 54
End: 2019-12-09

## 2019-12-09 ENCOUNTER — OFFICE VISIT (OUTPATIENT)
Dept: PAIN CLINIC | Facility: CLINIC | Age: 54
End: 2019-12-09

## 2019-12-09 VITALS
HEIGHT: 67 IN | BODY MASS INDEX: 35.47 KG/M2 | SYSTOLIC BLOOD PRESSURE: 118 MMHG | DIASTOLIC BLOOD PRESSURE: 80 MMHG | WEIGHT: 226 LBS | OXYGEN SATURATION: 94 % | HEART RATE: 83 BPM

## 2019-12-09 DIAGNOSIS — M54.16 LUMBAR RADICULOPATHY: ICD-10-CM

## 2019-12-09 DIAGNOSIS — M96.1 FAILED BACK SURGICAL SYNDROME: ICD-10-CM

## 2019-12-09 DIAGNOSIS — M72.2 PLANTAR FASCIITIS OF LEFT FOOT: ICD-10-CM

## 2019-12-09 DIAGNOSIS — Z98.890 S/P CARPAL TUNNEL RELEASE: Primary | ICD-10-CM

## 2019-12-09 DIAGNOSIS — M54.14 THORACIC RADICULITIS: Primary | ICD-10-CM

## 2019-12-09 PROCEDURE — 99214 OFFICE O/P EST MOD 30 MIN: CPT | Performed by: NURSE PRACTITIONER

## 2019-12-09 RX ORDER — AMITRIPTYLINE HYDROCHLORIDE 25 MG/1
25 TABLET, FILM COATED ORAL NIGHTLY
Qty: 30 TABLET | Refills: 0 | Status: SHIPPED | OUTPATIENT
Start: 2019-12-09 | End: 2020-02-07

## 2019-12-09 NOTE — PROGRESS NOTES
Name: Sukhdeep Higgins   : 1965   DOS: 2019     Pain Clinic Follow Up Visit:   Sukhdeep Higgins is a 48year old female who presents for recheck of her chronic back pain which is currently in the left thoracic and right lumbar area.   And radiate (25 mg total) by mouth nightly. 30 tablet 0   • Cyclobenzaprine HCl 10 MG Oral Tab Take 1 tablet (10 mg total) by mouth nightly.  90 tablet 2   • HYDROcodone-acetaminophen (NORCO) 5-325 MG Oral Tab Take 1 tablet by mouth every 8 (eight) hours as needed for after lower back MRI over the weekend. FINDINGS:    Interval anterior cervical spinal fusion changes are seen spanning the C4-C7 levels. Susceptibility artifact in the surgical hardware limits evaluation of adjacent structures.      There is straig suppression sequences. Images acquired in sagittal and axial planes. Intravenous gadolinium was administered followed by multiplanar post-infusion T1 weighted sequences.        PATIENT STATED HISTORY:(As transcribed by Technologist)  This is a pre surgical moderate facet arthropathy. There is no significant spinal canal or neural foraminal stenosis. No significant interval change. L5-S1: At the fused level, there is uncovering of the intervertebral disc with a diffuse disc bulge and endplate osteophytes. Prescriptions Disp Refills   • Amitriptyline HCl 25 MG Oral Tab 30 tablet 0     Sig: Take 1 tablet (25 mg total) by mouth nightly. Risks and benefits of the medications were discussed with patient today.  Patient should contact our office if this medica

## 2019-12-09 NOTE — TELEPHONE ENCOUNTER
Patient states she has an appointment this afternoon with Tita Jones from [facility] for f/u. Her PCP will not provide the referral and asked patient to contact Dr. Sima Beckett for the referral.  Will reach out to Dr. Sima Beckett .   Patient asked that I call h

## 2019-12-09 NOTE — PROGRESS NOTES
Patient presents in office today with reported pain in lower back right side, mid back left side, bottom of left foot, pelvis      Current pain level reported = 4/10    Last reported dose of HYDROcodone-acetaminophen (NORCO) 5-325 MG Oral Tab one week ago

## 2019-12-16 ENCOUNTER — TELEPHONE (OUTPATIENT)
Dept: PAIN CLINIC | Facility: CLINIC | Age: 54
End: 2019-12-16

## 2019-12-16 DIAGNOSIS — Z98.890 S/P CARPAL TUNNEL RELEASE: Primary | ICD-10-CM

## 2019-12-16 PROBLEM — M79.672 LEFT FOOT PAIN: Status: ACTIVE | Noted: 2019-12-16

## 2019-12-16 NOTE — TELEPHONE ENCOUNTER
At her last office visit we recommended left plantar fascia injection which can be performed in the office.   Which you please check with her if she is still experiencing plantar foot pain we can go ahead and schedule that injection once it is precertified

## 2019-12-17 NOTE — TELEPHONE ENCOUNTER
Office visit referral needed for patient to have injection by Yesenia Pappas in pain management per our prior authorization team.  Patient's PCP denied referral last time asked.  Will reach out to Dr. Ami Green

## 2019-12-17 NOTE — TELEPHONE ENCOUNTER
Patient will need IHP Referral for Injection done in the Office.  Patient used previous IHP Referral already for Pain Management issued by Abdi Huntley

## 2019-12-23 ENCOUNTER — TELEPHONE (OUTPATIENT)
Dept: SURGERY | Facility: CLINIC | Age: 54
End: 2019-12-23

## 2019-12-23 NOTE — TELEPHONE ENCOUNTER
LM informing patient ok to keep upcoming appointment to discuss procedure in detail. Spoke with Radha Love who stated ok for patient to still keep appointment for surgical discussion regarding SCS placement.  Radha Love is still working on finalizing cre

## 2019-12-23 NOTE — TELEPHONE ENCOUNTER
does pt still need the appt if Dr Chase Anton is not crendialled yet? She has foot injection scheduled on 1/20/20 with Dr Vannesa Orr. Please call.

## 2019-12-23 NOTE — TELEPHONE ENCOUNTER
LMTCB     When returns call please help patient schedule injection in office with Dr Jewel Blanco - left plantar fascia injection

## 2020-01-09 ENCOUNTER — TELEPHONE (OUTPATIENT)
Dept: PAIN CLINIC | Facility: CLINIC | Age: 55
End: 2020-01-09

## 2020-01-09 NOTE — TELEPHONE ENCOUNTER
Pt returning a call. I called Pain nurses spk with Belen Varela was unaware as to why Iveth Tapia was giving Pt a call. Iveth Tapia was rooming a Pt, Pt states Iveth Tapia can give her a call back at [phone].

## 2020-01-19 ENCOUNTER — HOSPITAL ENCOUNTER (OUTPATIENT)
Dept: MRI IMAGING | Age: 55
Discharge: HOME | End: 2020-01-19
Attending: NURSE PRACTITIONER
Payer: COMMERCIAL

## 2020-01-19 DIAGNOSIS — M54.14 THORACIC RADICULITIS: ICD-10-CM

## 2020-01-19 DIAGNOSIS — M54.16 LUMBAR RADICULOPATHY: ICD-10-CM

## 2020-01-19 PROCEDURE — 72146 MRI CHEST SPINE W/O DYE: CPT | Performed by: NURSE PRACTITIONER

## 2020-01-19 PROCEDURE — 72148 MRI LUMBAR SPINE W/O DYE: CPT | Performed by: NURSE PRACTITIONER

## 2020-02-07 RX ORDER — AMITRIPTYLINE HYDROCHLORIDE 25 MG/1
TABLET, FILM COATED ORAL
Qty: 30 TABLET | Refills: 0 | Status: SHIPPED | OUTPATIENT
Start: 2020-02-07 | End: 2020-03-09

## 2020-03-09 RX ORDER — AMITRIPTYLINE HYDROCHLORIDE 25 MG/1
TABLET, FILM COATED ORAL
Qty: 30 TABLET | Refills: 0 | Status: SHIPPED | OUTPATIENT
Start: 2020-03-09 | End: 2020-04-03

## 2020-03-09 NOTE — TELEPHONE ENCOUNTER
Medication: AMITRIPTYLINE HCL 25 MG Oral Tab    Date of last refill: 02/07/20  [place]    Last office visit: 12/09//2019  Due back to clinic per last office note: Return in about 1 week (around 12/16/2019).   Date nex indicates understanding of these issues and agrees to the plan.     Return in about 1 week (around 12/16/2019).      Faye Guzman, IRIS, 12/9/2019, 8:59 AM

## 2020-03-10 NOTE — TELEPHONE ENCOUNTER
Called patient to schedule SCS implant. Patent would like to wait until may when she can have time off from work.

## 2020-03-12 ENCOUNTER — TELEPHONE (OUTPATIENT)
Dept: SURGERY | Facility: CLINIC | Age: 55
End: 2020-03-12

## 2020-03-12 NOTE — TELEPHONE ENCOUNTER
Pt calling to see if we heard back from Dr Donna Tay regarding what date the spinal cord stimulator Sx should be resched for. Pt states she spoke with Omar Wen RN on 3/11.  Please advise

## 2020-03-12 NOTE — TELEPHONE ENCOUNTER
Returned patients call. She stated she is going on vacation at the end of May and would like to schedule her procedure first week of May. Explained I will reach out to Dr. China Benavidez regarding his schedule for the month of May.   Patient appreciative and obed

## 2020-03-23 DIAGNOSIS — Z98.890 S/P CARPAL TUNNEL RELEASE: Primary | ICD-10-CM

## 2020-03-24 RX ORDER — CYCLOBENZAPRINE HCL 10 MG
TABLET ORAL
Qty: 90 TABLET | Refills: 2 | Status: SHIPPED | OUTPATIENT
Start: 2020-03-24 | End: 2020-11-19

## 2020-03-24 NOTE — TELEPHONE ENCOUNTER
Medication: Cyclobenzaprine 10mg  (90 tablets)    Date of last refill: 01/03/20    [place] 94/04/26    Last office visit: 07/17/2019    Due back to clinic per last office note:  PRN    Date next office visit scheduled:

## 2020-04-03 DIAGNOSIS — M54.42 CHRONIC BILATERAL LOW BACK PAIN WITH LEFT-SIDED SCIATICA: Primary | ICD-10-CM

## 2020-04-03 DIAGNOSIS — M96.1 FAILED BACK SYNDROME, LUMBOSACRAL: ICD-10-CM

## 2020-04-03 DIAGNOSIS — G89.29 CHRONIC BILATERAL LOW BACK PAIN WITH LEFT-SIDED SCIATICA: Primary | ICD-10-CM

## 2020-04-03 RX ORDER — AMITRIPTYLINE HYDROCHLORIDE 25 MG/1
TABLET, FILM COATED ORAL
Qty: 30 TABLET | Refills: 0 | Status: SHIPPED | OUTPATIENT
Start: 2020-04-03 | End: 2020-05-07

## 2020-04-03 NOTE — TELEPHONE ENCOUNTER
Medication: AMITRIPTYLINE HCL 25 MG Oral Tab    Date of last refill: 3/9/2020  [place]    Last office visit: 12/9/2019  Due back to clinic per last office note:   One week  Date next office visit scheduled:  None    L issues and agrees to the plan.     Return in about 1 week (around 12/16/2019).      Faye Walker, IRIS, 12/9/2019, 8:59 AM

## 2020-05-07 DIAGNOSIS — M54.42 CHRONIC BILATERAL LOW BACK PAIN WITH LEFT-SIDED SCIATICA: ICD-10-CM

## 2020-05-07 DIAGNOSIS — M96.1 FAILED BACK SYNDROME, LUMBOSACRAL: ICD-10-CM

## 2020-05-07 DIAGNOSIS — G89.29 CHRONIC BILATERAL LOW BACK PAIN WITH LEFT-SIDED SCIATICA: ICD-10-CM

## 2020-05-07 RX ORDER — AMITRIPTYLINE HYDROCHLORIDE 25 MG/1
TABLET, FILM COATED ORAL
Qty: 30 TABLET | Refills: 0 | Status: SHIPPED | OUTPATIENT
Start: 2020-05-07 | End: 2020-06-06

## 2020-05-20 ENCOUNTER — TELEPHONE (OUTPATIENT)
Dept: SURGERY | Facility: CLINIC | Age: 55
End: 2020-05-20

## 2020-06-02 ENCOUNTER — OFFICE VISIT (OUTPATIENT)
Dept: PAIN CLINIC | Facility: CLINIC | Age: 55
End: 2020-06-02

## 2020-06-02 DIAGNOSIS — M96.1 FAILED BACK SURGICAL SYNDROME: Primary | ICD-10-CM

## 2020-06-02 PROCEDURE — 99214 OFFICE O/P EST MOD 30 MIN: CPT | Performed by: ANESTHESIOLOGY

## 2020-06-02 RX ORDER — LIDOCAINE 50 MG/G
2 PATCH TOPICAL EVERY 24 HOURS
Qty: 60 PATCH | Refills: 2 | Status: SHIPPED | OUTPATIENT
Start: 2020-06-02

## 2020-06-02 NOTE — PROGRESS NOTES
Name: Santo Guerrero   : 1965   DOS: 2020    Pain Clinic Follow Up Visit:   Santo Guerrero is a 48year old female who presents for recheck of her chronic low back and neck pain.   She is status post lumbar epidural, diagnostic lumbar facet interventional treatment but the patient did not have any pain relief from the procedure. Current Outpatient Medications   Medication Sig Dispense Refill   • lidocaine 5 % External Patch Place 2 patches onto the skin daily.  60 patch 2   • DULoxetine HCl 3 implantation today. The patient is going to contact neurosurgery department and will plan for implantation.   If her implantation is delayed due to COVID-19 pandemic situation I recommended her to come back for a left transforaminal lumbar epidural steroid

## 2020-06-02 NOTE — PROGRESS NOTES
Patient here to discuss left hip pain radiating to ankle, left foot and toes associated with numbness and tingling.      Rating pain - 6/10

## 2020-06-04 DIAGNOSIS — G89.29 CHRONIC BILATERAL LOW BACK PAIN WITH LEFT-SIDED SCIATICA: ICD-10-CM

## 2020-06-04 DIAGNOSIS — M54.42 CHRONIC BILATERAL LOW BACK PAIN WITH LEFT-SIDED SCIATICA: ICD-10-CM

## 2020-06-04 DIAGNOSIS — M96.1 FAILED BACK SYNDROME, LUMBOSACRAL: ICD-10-CM

## 2020-06-05 NOTE — ANESTHESIA POSTPROCEDURE EVALUATION
[facility] Patient Status:  Hospital Outpatient Surgery   Age/Gender 48year old female MRN [id number]   Location [facility] Attending Warden Timur MD   Hosp Day # 0 PCP MD Sophia Rosas Quintin Hartmann

## 2020-06-06 RX ORDER — AMITRIPTYLINE HYDROCHLORIDE 25 MG/1
TABLET, FILM COATED ORAL
Qty: 30 TABLET | Refills: 0 | Status: SHIPPED | OUTPATIENT
Start: 2020-06-06 | End: 2020-07-07

## 2020-06-12 NOTE — TELEPHONE ENCOUNTER
LVM with below information         Called patient to explain I will discuss dates with Dr. Sima Beckett as soon as he he returns. We will call patient surgery dates.

## 2020-06-12 NOTE — TELEPHONE ENCOUNTER
Pt calling to see if SCS are able to be scheduled yet or if still on hold due to [address], pt requesting call or [facility] mssg w/update

## 2020-06-16 ENCOUNTER — TELEPHONE (OUTPATIENT)
Dept: PAIN CLINIC | Facility: CLINIC | Age: 55
End: 2020-06-16

## 2020-06-16 DIAGNOSIS — M96.1 FAILED BACK SYNDROME: Primary | ICD-10-CM

## 2020-06-16 NOTE — TELEPHONE ENCOUNTER
Prior authorization request for Lidocaine 5% Patch submitted to [employer] via Cover My Meds, pending Alejandra Casiano

## 2020-06-16 NOTE — TELEPHONE ENCOUNTER
Request received from [address] [place] [address] for prior authorization of Lidocaine 5% Patch. Pharmacy started [place] in [facility]? Yes  Pending Key:  [id number]    Referral initiated.

## 2020-06-17 NOTE — TELEPHONE ENCOUNTER
LVMTCB to schedule surgery    You are scheduled for a Spinal cord stimulator implant on TBD at D with Dr. Bethanie Parham at [facility]).      Pre-op instructions discussed with patient:    · The [facility] make sure you get instructions about when to resume the medication before you are discharged. · Post operative appointments to be made 10-14 days after procedure.   · **Message will also be sent to your pain management office to inform them of your surgery

## 2020-06-18 NOTE — TELEPHONE ENCOUNTER
Patient stated she would like to wait until August to have SCS placement.   Will touch base with patient at a later time

## 2020-06-18 NOTE — TELEPHONE ENCOUNTER
Received Denial Letter from [employer]     lidocaine 5 % External Patch Denied   Case #:[id number]    Denial Reason:    -You must have a condition that is approved by the FDA for the requested drug and route of administration.     -Your doctor must s

## 2020-06-18 NOTE — TELEPHONE ENCOUNTER
Miranda Armando MD  You 1 hour ago (2:46 PM)      Faby Penny, she can use 4% patch instead of 5% Lidoderm

## 2020-06-19 NOTE — TELEPHONE ENCOUNTER
Left a detailed message on patient's confidential voicemail (ok per HIPPA) informing denial and Insurance requirements below .

## 2020-06-29 NOTE — TELEPHONE ENCOUNTER
Per Dr. Maile Culp, 8/11/20 is the date that works for provider. Called [facility] and secured date with Michael Leger. Called patient to offer 8/11/20, but was disconnected. Called patient a second time and discussed date/time available.  Patient agreed and stated t

## 2020-06-29 NOTE — TELEPHONE ENCOUNTER
Per Dr. Kenia Bazzi at [address] on 7/17/19:    \"IMAGING:  No new imaging     ASSESSMENT:  47 yo F s/p bilateral carpal tunnel release, most recently the right, doing well.  Chronic back and leg pain pending SCS when she is able to take off work.      PLAN:  -follow-

## 2020-06-29 NOTE — TELEPHONE ENCOUNTER
You are scheduled for a Spinal cord stimulator implant on  TBD at 0800 with Dr. Lissy Villafuerte at [facility]).      Pre-op instructions discussed with patient:    · The [address] team will reach please make sure you get instructions about when to resume the medication before you are discharged. ·   · Post operative appointments to be made 10-14 days after procedure.   ·     Message will also be sent to your pain management office to inform them of

## 2020-06-29 NOTE — TELEPHONE ENCOUNTER
Called [facility] and spoke with Erika Gonzales regarding available dates and times for patient's SCS implant.   Available times offered by Erika Gonzales:    8/6/20 after 1145  8/11/20 after 1200  8/13/20 after 1145    Contacted Dr. Radha Govea, awaiting his feedback on preferred d

## 2020-06-29 NOTE — TELEPHONE ENCOUNTER
Faxed surgery scheduling form along with insurance information and face sheet to [facility] at fax number:    [phone]    Surgery date placed on Outlook calendar and e-mailed to \"Dr. Perales's list\".     E-mail notification sent to [facility] coding team and to Me

## 2020-06-30 NOTE — TELEPHONE ENCOUNTER
Patient's surgery date was changed to 8/11/2020. Post-op appointments will need to be adjusted.      Once appointments are adjusted [facility] will need to be called and notified (phone #: [phone])

## 2020-06-30 NOTE — TELEPHONE ENCOUNTER
PCP clearance request letter sent to Dr. Sheryl Garcia to fax number:    [phone]    Routed to [facility] for notification of surgery date.

## 2020-07-01 ENCOUNTER — TELEPHONE (OUTPATIENT)
Dept: SURGERY | Facility: CLINIC | Age: 55
End: 2020-07-01

## 2020-07-01 NOTE — TELEPHONE ENCOUNTER
attending physician statement in short term disability claim form to be completed. Endorsed to provider.

## 2020-07-07 DIAGNOSIS — G89.29 CHRONIC BILATERAL LOW BACK PAIN WITH LEFT-SIDED SCIATICA: ICD-10-CM

## 2020-07-07 DIAGNOSIS — M54.42 CHRONIC BILATERAL LOW BACK PAIN WITH LEFT-SIDED SCIATICA: ICD-10-CM

## 2020-07-07 DIAGNOSIS — M96.1 FAILED BACK SYNDROME, LUMBOSACRAL: ICD-10-CM

## 2020-07-07 RX ORDER — AMITRIPTYLINE HYDROCHLORIDE 25 MG/1
25 TABLET, FILM COATED ORAL NIGHTLY
Qty: 30 TABLET | Refills: 0 | Status: SHIPPED | OUTPATIENT
Start: 2020-07-07 | End: 2020-08-12

## 2020-07-07 NOTE — TELEPHONE ENCOUNTER
Medication: AMITRIPTYLINE HCL 25 MG Oral Tab    Date of last refill: 6/6/2020  [place]    Last office visit: 6/4/2020  Due back to clinic per last office note:  Not Indicated  Date next office visit scheduled:  None

## 2020-07-08 NOTE — TELEPHONE ENCOUNTER
Faxed completed form to [facility] [phone]. Fax confirmed. Copy sent to scanning.

## 2020-07-20 ENCOUNTER — OFFICE VISIT (OUTPATIENT)
Dept: NEUROLOGY | Facility: CLINIC | Age: 55
End: 2020-07-20

## 2020-07-20 VITALS — SYSTOLIC BLOOD PRESSURE: 124 MMHG | HEART RATE: 78 BPM | DIASTOLIC BLOOD PRESSURE: 74 MMHG

## 2020-07-20 DIAGNOSIS — Z98.1 S/P CERVICAL SPINAL FUSION: ICD-10-CM

## 2020-07-20 DIAGNOSIS — M96.1 FAILED BACK SYNDROME, LUMBOSACRAL: ICD-10-CM

## 2020-07-20 DIAGNOSIS — G89.29 CHRONIC BILATERAL LOW BACK PAIN WITH LEFT-SIDED SCIATICA: Primary | ICD-10-CM

## 2020-07-20 DIAGNOSIS — M54.42 CHRONIC BILATERAL LOW BACK PAIN WITH LEFT-SIDED SCIATICA: Primary | ICD-10-CM

## 2020-07-20 DIAGNOSIS — M54.2 CERVICALGIA: ICD-10-CM

## 2020-07-20 DIAGNOSIS — Z98.1 S/P LUMBAR FUSION: ICD-10-CM

## 2020-07-20 PROCEDURE — 88175 CYTOPATH C/V AUTO FLUID REDO: CPT | Performed by: NURSE PRACTITIONER

## 2020-07-20 PROCEDURE — 3074F SYST BP LT 130 MM HG: CPT | Performed by: NEUROLOGICAL SURGERY

## 2020-07-20 PROCEDURE — 3078F DIAST BP <80 MM HG: CPT | Performed by: NEUROLOGICAL SURGERY

## 2020-07-20 PROCEDURE — 87624 HPV HI-RISK TYP POOLED RSLT: CPT | Performed by: NURSE PRACTITIONER

## 2020-07-20 PROCEDURE — 99214 OFFICE O/P EST MOD 30 MIN: CPT | Performed by: NEUROLOGICAL SURGERY

## 2020-07-20 NOTE — PROGRESS NOTES
Pt is here for follow up. Pt was last seen in the office 7/17/19     Pt was advised to follow up PRN    SCS Trail: 100% of relief.      Pt states that for the past couple of months she has noticed symptome's of the right side of the lower back with occ radi

## 2020-07-20 NOTE — PROGRESS NOTES
[facility]  Neurosurgery Clinic Visit      HISTORY OF PRESENT ILLNESS:  Art Stager is a(n) 50year old female here for preoperative discuss for SCS placement 8/11/2020. States she had great coverage with SCS trial 5/2019.  She is re left carpal tunnel release   • OTHER  05/23/2019    right carpal tunnel release   • OTHER SURGICAL HISTORY     • POSTERIOR LUMBAR LAMINECT SPINAL FUS W/INSTR 1 LEV Bilateral 5/4/2018    Performed by Tanna Walker MD at [place] [address] GENERAL:  Patient is a 50year old female in no acute distress. NEUROLOGICAL:     Cognition: alert and oriented.     Face is symmetrical    Motor: 5/5 both legs   Sensation: intact to light touch bilaterally     Gait: intact     DIAGNOSTIC DATA:      IMAGI

## 2020-07-22 NOTE — TELEPHONE ENCOUNTER
Prior authorization request completed for: SCS Implant  Authorization/Referral #[id number] - [employer]  Authorization dates: 7/21/20-10/19/20  CPT codes approved: 60083,73627  Number of visits/dates of service approved: outpatient [facility]  Physician: Dr Kobe Martinez

## 2020-07-23 ENCOUNTER — PATIENT MESSAGE (OUTPATIENT)
Dept: NEUROLOGY | Facility: CLINIC | Age: 55
End: 2020-07-23

## 2020-07-23 DIAGNOSIS — M43.22 CERVICAL VERTEBRAL FUSION: ICD-10-CM

## 2020-07-23 DIAGNOSIS — R20.0 BILATERAL NUMBNESS AND TINGLING OF ARMS AND LEGS: ICD-10-CM

## 2020-07-23 DIAGNOSIS — M54.2 NECK PAIN: Primary | ICD-10-CM

## 2020-07-23 DIAGNOSIS — R20.2 BILATERAL NUMBNESS AND TINGLING OF ARMS AND LEGS: ICD-10-CM

## 2020-07-23 NOTE — TELEPHONE ENCOUNTER
From: Prabhu Merida  To: Ervin Reeder MD  Sent: 7/23/2020 12:09 PM CDT  Subject: Non-Urgent Medical Question    Hi. During my visit 7/20/2020, I mentioned my neck pain and tingling, but forgot to ask more about it.  I said that I had an MRI since my new

## 2020-07-24 ENCOUNTER — HOSPITAL ENCOUNTER (OUTPATIENT)
Dept: MAMMOGRAPHY | Age: 55
Discharge: HOME | End: 2020-07-24
Attending: NURSE PRACTITIONER
Payer: COMMERCIAL

## 2020-07-24 DIAGNOSIS — Z12.31 ENCOUNTER FOR SCREENING MAMMOGRAM FOR MALIGNANT NEOPLASM OF BREAST: ICD-10-CM

## 2020-07-24 PROCEDURE — 77067 SCR MAMMO BI INCL CAD: CPT | Performed by: NURSE PRACTITIONER

## 2020-07-24 PROCEDURE — 77063 BREAST TOMOSYNTHESIS BI: CPT | Performed by: NURSE PRACTITIONER

## 2020-07-30 ENCOUNTER — EKG ENCOUNTER (OUTPATIENT)
Dept: LAB | Facility: HOSPITAL | Age: 55
End: 2020-07-30
Attending: NEUROLOGICAL SURGERY
Payer: COMMERCIAL

## 2020-07-30 ENCOUNTER — HOSPITAL ENCOUNTER (OUTPATIENT)
Dept: MRI IMAGING | Facility: HOSPITAL | Age: 55
Discharge: HOME | End: 2020-07-30
Attending: NEUROLOGICAL SURGERY
Payer: COMMERCIAL

## 2020-07-30 DIAGNOSIS — M54.2 NECK PAIN: ICD-10-CM

## 2020-07-30 DIAGNOSIS — Z01.818 PRE-OP EXAMINATION: Primary | ICD-10-CM

## 2020-07-30 DIAGNOSIS — M43.22 CERVICAL VERTEBRAL FUSION: ICD-10-CM

## 2020-07-30 DIAGNOSIS — R20.0 BILATERAL NUMBNESS AND TINGLING OF ARMS AND LEGS: ICD-10-CM

## 2020-07-30 DIAGNOSIS — R20.2 BILATERAL NUMBNESS AND TINGLING OF ARMS AND LEGS: ICD-10-CM

## 2020-07-30 LAB
ALBUMIN SERPL-MCNC: 3.8 G/DL (ref 3.4–5)
ALBUMIN/GLOB SERPL: 1 {RATIO} (ref 1–2)
ALP LIVER SERPL-CCNC: 99 U/L (ref 41–108)
ALT SERPL-CCNC: 30 U/L (ref 13–56)
ANION GAP SERPL CALC-SCNC: 1 MMOL/L (ref 0–18)
APTT PPP: 30.4 SECONDS (ref 25.4–36.1)
AST SERPL-CCNC: 19 U/L (ref 15–37)
BASOPHILS # BLD AUTO: 0.05 X10(3) UL (ref 0–0.2)
BASOPHILS NFR BLD AUTO: 0.5 %
BILIRUB SERPL-MCNC: 0.5 MG/DL (ref 0.1–2)
BUN BLD-MCNC: 17 MG/DL (ref 7–18)
BUN/CREAT SERPL: 17 (ref 10–20)
CALCIUM BLD-MCNC: 8.9 MG/DL (ref 8.5–10.1)
CHLORIDE SERPL-SCNC: 107 MMOL/L (ref 98–112)
CO2 SERPL-SCNC: 30 MMOL/L (ref 21–32)
CREAT BLD-MCNC: 1 MG/DL (ref 0.55–1.02)
DEPRECATED RDW RBC AUTO: 43.7 FL (ref 35.1–46.3)
EOSINOPHIL # BLD AUTO: 0.13 X10(3) UL (ref 0–0.7)
EOSINOPHIL NFR BLD AUTO: 1.3 %
ERYTHROCYTE [DISTWIDTH] IN BLOOD BY AUTOMATED COUNT: 13.2 % (ref 11–15)
GLOBULIN PLAS-MCNC: 3.8 G/DL (ref 2.8–4.4)
GLUCOSE BLD-MCNC: 82 MG/DL (ref 70–99)
HCT VFR BLD AUTO: 41 % (ref 35–48)
HGB BLD-MCNC: 13.9 G/DL (ref 12–16)
IMM GRANULOCYTES # BLD AUTO: 0.02 X10(3) UL (ref 0–1)
IMM GRANULOCYTES NFR BLD: 0.2 %
INR BLD: 0.91 (ref 0.89–1.11)
LYMPHOCYTES # BLD AUTO: 4.1 X10(3) UL (ref 1–4)
LYMPHOCYTES NFR BLD AUTO: 41 %
M PROTEIN MFR SERPL ELPH: 7.6 G/DL (ref 6.4–8.2)
MCH RBC QN AUTO: 30.4 PG (ref 26–34)
MCHC RBC AUTO-ENTMCNC: 33.9 G/DL (ref 31–37)
MCV RBC AUTO: 89.7 FL (ref 80–100)
MONOCYTES # BLD AUTO: 0.7 X10(3) UL (ref 0.1–1)
MONOCYTES NFR BLD AUTO: 7 %
NEUTROPHILS # BLD AUTO: 5 X10 (3) UL (ref 1.5–7.7)
NEUTROPHILS # BLD AUTO: 5 X10(3) UL (ref 1.5–7.7)
NEUTROPHILS NFR BLD AUTO: 50 %
OSMOLALITY SERPL CALC.SUM OF ELEC: 287 MOSM/KG (ref 275–295)
PATIENT FASTING Y/N/NP: NO
PLATELET # BLD AUTO: 267 10(3)UL (ref 150–450)
POTASSIUM SERPL-SCNC: 3.8 MMOL/L (ref 3.5–5.1)
PSA SERPL DL<=0.01 NG/ML-MCNC: 12.5 SECONDS (ref 12.4–14.6)
RBC # BLD AUTO: 4.57 X10(6)UL (ref 3.8–5.3)
SODIUM SERPL-SCNC: 138 MMOL/L (ref 136–145)
WBC # BLD AUTO: 10 X10(3) UL (ref 4–11)

## 2020-07-30 PROCEDURE — 85025 COMPLETE CBC W/AUTO DIFF WBC: CPT

## 2020-07-30 PROCEDURE — 72141 MRI NECK SPINE W/O DYE: CPT | Performed by: NEUROLOGICAL SURGERY

## 2020-07-30 PROCEDURE — 85610 PROTHROMBIN TIME: CPT

## 2020-07-30 PROCEDURE — 93005 ELECTROCARDIOGRAM TRACING: CPT

## 2020-07-30 PROCEDURE — 87081 CULTURE SCREEN ONLY: CPT

## 2020-07-30 PROCEDURE — 80053 COMPREHEN METABOLIC PANEL: CPT

## 2020-07-30 PROCEDURE — 93010 ELECTROCARDIOGRAM REPORT: CPT | Performed by: INTERNAL MEDICINE

## 2020-07-30 PROCEDURE — 36415 COLL VENOUS BLD VENIPUNCTURE: CPT

## 2020-07-30 PROCEDURE — 85730 THROMBOPLASTIN TIME PARTIAL: CPT

## 2020-07-30 NOTE — TELEPHONE ENCOUNTER
PCP clearance needed. Spoke with patient who said she had a televisit a few weeks ago with PCP office. Call made to PCP office who stated televisit was just to review lab results and that patient will need to come in for clearance.     PCP office will be

## 2020-07-31 LAB
ATRIAL RATE: 64 BPM
P AXIS: 51 DEGREES
P-R INTERVAL: 154 MS
Q-T INTERVAL: 430 MS
QRS DURATION: 78 MS
QTC CALCULATION (BEZET): 443 MS
R AXIS: 36 DEGREES
T AXIS: 57 DEGREES
VENTRICULAR RATE: 64 BPM

## 2020-08-04 ENCOUNTER — TELEPHONE (OUTPATIENT)
Dept: SURGERY | Facility: CLINIC | Age: 55
End: 2020-08-04

## 2020-08-04 NOTE — TELEPHONE ENCOUNTER
Pt getting Covid test Saturday plus 2 days quarantine. Needs note off work for Saturday and Monday. Can put letter in [employer].

## 2020-08-04 NOTE — TELEPHONE ENCOUNTER
Spoke with PCP office who stated patient was seen on 7/30. Once clearance note is completed they will fax it over to our office.

## 2020-08-08 ENCOUNTER — LAB ENCOUNTER (OUTPATIENT)
Dept: LAB | Facility: HOSPITAL | Age: 55
End: 2020-08-08
Attending: FAMILY MEDICINE
Payer: COMMERCIAL

## 2020-08-08 DIAGNOSIS — Z01.818 OTHER SPECIFIED PRE-OPERATIVE EXAMINATION: ICD-10-CM

## 2020-08-08 DIAGNOSIS — Z11.59 ENCOUNTER FOR SCREENING FOR OTHER VIRAL DISEASES: ICD-10-CM

## 2020-08-09 LAB — SARS-COV-2 RNA RESP QL NAA+PROBE: NOT DETECTED

## 2020-08-10 NOTE — TELEPHONE ENCOUNTER
Spoke with PCP office to check status of medical clearance note. Was informed that clearance note is still pending and that they would send an urgent message to  to complete note so it can be faxed to our office today.

## 2020-08-11 ENCOUNTER — TELEPHONE (OUTPATIENT)
Dept: SURGERY | Facility: CLINIC | Age: 55
End: 2020-08-11

## 2020-08-11 DIAGNOSIS — G89.29 CHRONIC BILATERAL LOW BACK PAIN WITH LEFT-SIDED SCIATICA: ICD-10-CM

## 2020-08-11 DIAGNOSIS — M96.1 FAILED BACK SYNDROME, LUMBOSACRAL: ICD-10-CM

## 2020-08-11 DIAGNOSIS — M54.42 CHRONIC BILATERAL LOW BACK PAIN WITH LEFT-SIDED SCIATICA: ICD-10-CM

## 2020-08-11 RX ORDER — HYDROCODONE BITARTRATE AND ACETAMINOPHEN 5; 325 MG/1; MG/1
1 TABLET ORAL EVERY 8 HOURS PRN
Qty: 20 TABLET | Refills: 0 | Status: SHIPPED | OUTPATIENT
Start: 2020-08-11 | End: 2021-10-05

## 2020-08-11 RX ORDER — CLINDAMYCIN HYDROCHLORIDE 300 MG/1
300 CAPSULE ORAL 3 TIMES DAILY
Qty: 9 CAPSULE | Refills: 0 | Status: SHIPPED | OUTPATIENT
Start: 2020-08-11 | End: 2020-08-14

## 2020-08-12 RX ORDER — AMITRIPTYLINE HYDROCHLORIDE 25 MG/1
TABLET, FILM COATED ORAL
Qty: 30 TABLET | Refills: 0 | Status: SHIPPED | OUTPATIENT
Start: 2020-08-12 | End: 2020-09-29

## 2020-08-12 NOTE — TELEPHONE ENCOUNTER
Medication: Amitriptyline HCl 25 MG Oral Tab    Date of last refill: 07/07/2020  [place]    Last office visit: 06/02/2020  Due back to clinic per last office note:  Not indicated   Date next office visit scheduled:

## 2020-08-24 ENCOUNTER — OFFICE VISIT (OUTPATIENT)
Dept: NEUROLOGY | Facility: CLINIC | Age: 55
End: 2020-08-24

## 2020-08-24 VITALS — HEART RATE: 74 BPM | DIASTOLIC BLOOD PRESSURE: 80 MMHG | SYSTOLIC BLOOD PRESSURE: 122 MMHG

## 2020-08-24 DIAGNOSIS — M54.12 CERVICAL RADICULITIS: ICD-10-CM

## 2020-08-24 DIAGNOSIS — M96.1 FAILED BACK SYNDROME, LUMBOSACRAL: ICD-10-CM

## 2020-08-24 DIAGNOSIS — Z96.89 S/P INSERTION OF SPINAL CORD STIMULATOR: ICD-10-CM

## 2020-08-24 DIAGNOSIS — Z98.1 S/P LUMBAR FUSION: Primary | ICD-10-CM

## 2020-08-24 PROCEDURE — 3074F SYST BP LT 130 MM HG: CPT | Performed by: PHYSICIAN ASSISTANT

## 2020-08-24 PROCEDURE — 99024 POSTOP FOLLOW-UP VISIT: CPT | Performed by: PHYSICIAN ASSISTANT

## 2020-08-24 PROCEDURE — 3079F DIAST BP 80-89 MM HG: CPT | Performed by: PHYSICIAN ASSISTANT

## 2020-08-24 NOTE — PROGRESS NOTES
Pt is here for 2 week post op for SCS Placement 8/11/2020     Pt states that incision looks good no issues with incursion or issues with fever or chills.

## 2020-08-24 NOTE — PROGRESS NOTES
Neurosurgery Clinic Visit  2020    Santo Guerrero PCP:  Ronald Siddiqui MD    1965 MRN [id number]       CC:  S/P SCS placement 2020  [facility]    HPI:    Chary Saini is here for 2 week post op. She is doing really well.   The stimulator help • CARPAL TUNNEL RELEASE Bilateral 05/2019,04/2019   • CHOLECYSTECTOMY       • EXCIS LUMBAR DISK,ONE LEVEL       • INTRAOPERATIVE NEURO MONITORING N/A 7/11/2017     Performed by Lissette Fay MD at [facility] MAIN OR   • LUMBAR FACET INJECTION Bilateral 12/13/2 reports that she quit smoking about 2 years ago. She has a 15.00 pack-year smoking history. She has never used smokeless tobacco. She reports current drug use. Drug: Cannabis.  She reports that she does not drink alcohol.     ALLERGIES:     Perla Merlene Castro PA-C  [facility]  8/24/2020  11:40 AM     I have seen and examined this patient and agree with the findings documented above. Doing well, marked improvement in back and leg pain with stimulator.   Still has some upper extr

## 2020-08-27 ENCOUNTER — HOSPITAL ENCOUNTER (OUTPATIENT)
Dept: GENERAL RADIOLOGY | Age: 55
Discharge: HOME | End: 2020-08-27
Attending: PHYSICIAN ASSISTANT
Payer: COMMERCIAL

## 2020-08-27 DIAGNOSIS — M54.12 CERVICAL RADICULITIS: ICD-10-CM

## 2020-08-27 PROCEDURE — 72052 X-RAY EXAM NECK SPINE 6/>VWS: CPT | Performed by: PHYSICIAN ASSISTANT

## 2020-09-16 ENCOUNTER — OFFICE VISIT (OUTPATIENT)
Dept: SURGERY | Facility: CLINIC | Age: 55
End: 2020-09-16

## 2020-09-16 VITALS — SYSTOLIC BLOOD PRESSURE: 110 MMHG | HEART RATE: 76 BPM | DIASTOLIC BLOOD PRESSURE: 74 MMHG

## 2020-09-16 DIAGNOSIS — G89.29 CHRONIC PAIN OF RIGHT KNEE: Primary | ICD-10-CM

## 2020-09-16 DIAGNOSIS — M25.561 CHRONIC PAIN OF RIGHT KNEE: Primary | ICD-10-CM

## 2020-09-16 PROCEDURE — 99024 POSTOP FOLLOW-UP VISIT: CPT | Performed by: NEUROLOGICAL SURGERY

## 2020-09-16 PROCEDURE — 3078F DIAST BP <80 MM HG: CPT | Performed by: NEUROLOGICAL SURGERY

## 2020-09-16 PROCEDURE — 3074F SYST BP LT 130 MM HG: CPT | Performed by: NEUROLOGICAL SURGERY

## 2020-09-16 RX ORDER — METHYLPREDNISOLONE 4 MG/1
TABLET ORAL
Qty: 1 PACKAGE | Refills: 0 | Status: SHIPPED | OUTPATIENT
Start: 2020-09-16 | End: 2021-10-05

## 2020-09-16 NOTE — PROGRESS NOTES
Pt is here for post op     spinal cord stimulator implantation     Pt states that incision site is ok no issues with fever chills redness or hot to the touch

## 2020-09-16 NOTE — PROGRESS NOTES
[facility]  Neurosurgery Clinic Visit      HISTORY OF PRESENT ILLNESS:  Derrick Hummel is a(n) 50year old female s/p 8/11/20 SCS implant. She is doing very well in regard to the stimulator placement.   Her back and leg pain are signifi SACROILIAC JOINT INJECTION BILATERAL Bilateral 12/4/2018    Performed by Lazaro Brenner MD at [facility] MAIN OR   • SACROILIAC JOINT INJECTION RIGHT OR LEFT Left 12/15/2016    Performed by Lazaro Brenner MD at [facility] MAIN OR   • SPINAL CORD STIMULATION TRIAL N/ intact to light touch bilaterally     Reflexes:deferred   Coordination: deferred   Gait: deferred  Incisions healing well, small area of superficial scab at middle of thoracic incision       DIAGNOSTIC DATA:      IMAGING:  No new imaging    ASSESSMENT:  50

## 2020-09-21 ENCOUNTER — TELEPHONE (OUTPATIENT)
Dept: ORTHOPEDICS CLINIC | Facility: CLINIC | Age: 55
End: 2020-09-21

## 2020-09-22 ENCOUNTER — HOSPITAL ENCOUNTER (OUTPATIENT)
Dept: GENERAL RADIOLOGY | Age: 55
Discharge: HOME | End: 2020-09-22
Attending: ORTHOPAEDIC SURGERY
Payer: COMMERCIAL

## 2020-09-22 DIAGNOSIS — M25.561 RIGHT KNEE PAIN, UNSPECIFIED CHRONICITY: ICD-10-CM

## 2020-09-22 PROCEDURE — 73564 X-RAY EXAM KNEE 4 OR MORE: CPT | Performed by: ORTHOPAEDIC SURGERY

## 2020-09-28 DIAGNOSIS — M96.1 FAILED BACK SYNDROME, LUMBOSACRAL: ICD-10-CM

## 2020-09-28 DIAGNOSIS — M54.42 CHRONIC BILATERAL LOW BACK PAIN WITH LEFT-SIDED SCIATICA: ICD-10-CM

## 2020-09-28 DIAGNOSIS — G89.29 CHRONIC BILATERAL LOW BACK PAIN WITH LEFT-SIDED SCIATICA: ICD-10-CM

## 2020-09-28 NOTE — TELEPHONE ENCOUNTER
Medication: AMITRIPTYLINE HCL 25 MG Oral Tab    Date of last refill: 8/12/2020  [place]    Last office visit: 06/02/2020  Due back to clinic per last office note:  Not indicated   Date next office visit scheduled:  N

## 2020-09-29 RX ORDER — AMITRIPTYLINE HYDROCHLORIDE 25 MG/1
TABLET, FILM COATED ORAL
Qty: 30 TABLET | Refills: 0 | Status: SHIPPED | OUTPATIENT
Start: 2020-09-29 | End: 2020-10-27

## 2020-10-17 ENCOUNTER — OFFICE VISIT (OUTPATIENT)
Dept: ORTHOPEDICS CLINIC | Facility: CLINIC | Age: 55
End: 2020-10-17

## 2020-10-17 VITALS — HEART RATE: 74 BPM | OXYGEN SATURATION: 98 %

## 2020-10-17 DIAGNOSIS — M25.561 ACUTE PAIN OF RIGHT KNEE: Primary | ICD-10-CM

## 2020-10-17 PROCEDURE — 99203 OFFICE O/P NEW LOW 30 MIN: CPT | Performed by: ORTHOPAEDIC SURGERY

## 2020-10-17 NOTE — H&P
[facility] New Patient Note    CC: Patient presents with:  Knee Pain: right knee pain       HPI: This 50year old female presents today with complaints of resolved right knee pain. Patient reports that the pain started about 1 month ago.   She states Performed by Yue Ulloa MD at [facility]   • LUMBAR FACET INJECTION Left 12/22/2016    Performed by Yue Ulloa MD at [facility] MAIN OR   • OTHER  04/09/2019    left carpal tunnel release   • OTHER  05/23/2019    right carpal tunnel release   • OTHER TAKE 1 TABLET(10 MG) BY MOUTH EVERY NIGHT 90 tablet 2   • PROCTO-MED HC 2.5 % Rectal Cream   0   • Triamterene-HCTZ 37.5-25 MG Oral Cap Take 1 capsule by mouth daily. 0   • atorvastatin 10 MG Oral Tab Take 10 mg by mouth daily.      • Fexofenadine HCl 180 out of 5 quad strength, sensation intact light touch  • Vascular: Warm well perfused lower extremity      Imaging: X-rays of the right knee personally viewed, independently interpreted and radiology report read.   Demonstrates maintained joint space, no acu

## 2020-10-26 DIAGNOSIS — M96.1 FAILED BACK SYNDROME, LUMBOSACRAL: ICD-10-CM

## 2020-10-26 DIAGNOSIS — M54.42 CHRONIC BILATERAL LOW BACK PAIN WITH LEFT-SIDED SCIATICA: ICD-10-CM

## 2020-10-26 DIAGNOSIS — G89.29 CHRONIC BILATERAL LOW BACK PAIN WITH LEFT-SIDED SCIATICA: ICD-10-CM

## 2020-10-27 RX ORDER — AMITRIPTYLINE HYDROCHLORIDE 25 MG/1
TABLET, FILM COATED ORAL
Qty: 30 TABLET | Refills: 0 | Status: SHIPPED | OUTPATIENT
Start: 2020-10-27 | End: 2020-11-30

## 2020-11-15 ENCOUNTER — APPOINTMENT (OUTPATIENT)
Dept: GENERAL RADIOLOGY | Age: 55
End: 2020-11-15
Attending: PHYSICIAN ASSISTANT
Payer: COMMERCIAL

## 2020-11-15 ENCOUNTER — HOSPITAL ENCOUNTER (OUTPATIENT)
Age: 55
Discharge: HOME | End: 2020-11-15
Payer: COMMERCIAL

## 2020-11-15 VITALS
TEMPERATURE: 98 F | RESPIRATION RATE: 16 BRPM | DIASTOLIC BLOOD PRESSURE: 84 MMHG | HEART RATE: 75 BPM | SYSTOLIC BLOOD PRESSURE: 119 MMHG | OXYGEN SATURATION: 100 %

## 2020-11-15 DIAGNOSIS — R07.89 CHEST WALL PAIN: Primary | ICD-10-CM

## 2020-11-15 DIAGNOSIS — R07.89 CHEST PAIN, ATYPICAL: ICD-10-CM

## 2020-11-15 PROCEDURE — 84484 ASSAY OF TROPONIN QUANT: CPT

## 2020-11-15 PROCEDURE — 99215 OFFICE O/P EST HI 40 MIN: CPT

## 2020-11-15 PROCEDURE — 71046 X-RAY EXAM CHEST 2 VIEWS: CPT | Performed by: PHYSICIAN ASSISTANT

## 2020-11-15 PROCEDURE — 80047 BASIC METABLC PNL IONIZED CA: CPT

## 2020-11-15 PROCEDURE — 93010 ELECTROCARDIOGRAM REPORT: CPT

## 2020-11-15 PROCEDURE — 96374 THER/PROPH/DIAG INJ IV PUSH: CPT

## 2020-11-15 PROCEDURE — 85025 COMPLETE CBC W/AUTO DIFF WBC: CPT | Performed by: PHYSICIAN ASSISTANT

## 2020-11-15 PROCEDURE — 93005 ELECTROCARDIOGRAM TRACING: CPT

## 2020-11-15 RX ORDER — KETOROLAC TROMETHAMINE 30 MG/ML
30 INJECTION, SOLUTION INTRAMUSCULAR; INTRAVENOUS ONCE
Status: COMPLETED | OUTPATIENT
Start: 2020-11-15 | End: 2020-11-15

## 2020-11-15 NOTE — ED INITIAL ASSESSMENT (HPI)
Describes a\"a feeling like a knot\" from left chest wall upper, lateral chest and along behind scapula.   Thought might be gas  Cannot recall a strain  Rates at 7  Present for 7 days

## 2020-11-18 DIAGNOSIS — Z98.890 S/P CARPAL TUNNEL RELEASE: ICD-10-CM

## 2020-11-19 RX ORDER — CYCLOBENZAPRINE HCL 10 MG
TABLET ORAL
Qty: 90 TABLET | Refills: 2 | Status: SHIPPED | OUTPATIENT
Start: 2020-11-19 | End: 2021-08-09

## 2020-11-19 NOTE — TELEPHONE ENCOUNTER
Medication:   CYCLOBENZAPRINE 10 MG Oral Tab 90 tablet 2 3/24/2020     Sig: TAKE 1 TABLET(10 MG) BY MOUTH EVERY NIGHT    Sent to pharmacy as: Cyclobenzaprine HCl 10 MG Oral Tablet (FLEXERIL)    E-Prescribing Status: Receipt confirmed by pharmacy (3/24/2020

## 2020-11-30 DIAGNOSIS — G89.29 CHRONIC BILATERAL LOW BACK PAIN WITH LEFT-SIDED SCIATICA: ICD-10-CM

## 2020-11-30 DIAGNOSIS — M96.1 FAILED BACK SYNDROME, LUMBOSACRAL: ICD-10-CM

## 2020-11-30 DIAGNOSIS — M54.42 CHRONIC BILATERAL LOW BACK PAIN WITH LEFT-SIDED SCIATICA: ICD-10-CM

## 2020-11-30 RX ORDER — AMITRIPTYLINE HYDROCHLORIDE 25 MG/1
TABLET, FILM COATED ORAL
Qty: 30 TABLET | Refills: 0 | Status: SHIPPED | OUTPATIENT
Start: 2020-11-30 | End: 2020-12-30

## 2020-12-30 DIAGNOSIS — G89.29 CHRONIC BILATERAL LOW BACK PAIN WITH LEFT-SIDED SCIATICA: ICD-10-CM

## 2020-12-30 DIAGNOSIS — M96.1 FAILED BACK SYNDROME, LUMBOSACRAL: ICD-10-CM

## 2020-12-30 DIAGNOSIS — M54.42 CHRONIC BILATERAL LOW BACK PAIN WITH LEFT-SIDED SCIATICA: ICD-10-CM

## 2020-12-30 RX ORDER — AMITRIPTYLINE HYDROCHLORIDE 25 MG/1
25 TABLET, FILM COATED ORAL NIGHTLY
Qty: 30 TABLET | Refills: 0 | Status: SHIPPED | OUTPATIENT
Start: 2020-12-30 | End: 2021-02-02

## 2020-12-30 NOTE — TELEPHONE ENCOUNTER
Medication: AMITRIPTYLINE HCL 25 MG Oral Tab    Date of last refill: 11/30/20  [place]     Last office visit: 6/2/20   Due back to clinic per last office note: None noted   Date next office visit scheduled: None

## 2020-12-30 NOTE — TELEPHONE ENCOUNTER
Received a refill request via fax for AMITRIPTYLINE HCL 25 MG Oral Tab from [facility]

## 2021-02-02 DIAGNOSIS — M96.1 FAILED BACK SYNDROME, LUMBOSACRAL: ICD-10-CM

## 2021-02-02 DIAGNOSIS — M54.42 CHRONIC BILATERAL LOW BACK PAIN WITH LEFT-SIDED SCIATICA: ICD-10-CM

## 2021-02-02 DIAGNOSIS — G89.29 CHRONIC BILATERAL LOW BACK PAIN WITH LEFT-SIDED SCIATICA: ICD-10-CM

## 2021-02-02 RX ORDER — AMITRIPTYLINE HYDROCHLORIDE 25 MG/1
25 TABLET, FILM COATED ORAL NIGHTLY
Qty: 30 TABLET | Refills: 2 | Status: SHIPPED | OUTPATIENT
Start: 2021-02-02 | End: 2021-05-07

## 2021-02-02 NOTE — TELEPHONE ENCOUNTER
Received a refill request via fax for Amitriptyline HCl 25 MG Oral Tab from [facility] [place]     Medication: Amitriptyline HCl 25 MG Oral Tab    Date of last refill: 12/30/20   [place]     Last office visit

## 2021-03-03 DIAGNOSIS — Z23 NEED FOR VACCINATION: ICD-10-CM

## 2021-03-12 ENCOUNTER — PATIENT MESSAGE (OUTPATIENT)
Dept: SURGERY | Facility: CLINIC | Age: 56
End: 2021-03-12

## 2021-03-12 NOTE — TELEPHONE ENCOUNTER
From: Beverly Merida  To: CHRISS Castro  Sent: 3/12/2021 11:04 AM CST  Subject: Other    Hello. I'm not sure which doctor I should contact, please advise.     I'm having pain at my stimulator site, and going up towards the ribs Stinging, and sore if I move

## 2021-03-12 NOTE — TELEPHONE ENCOUNTER
S: pt calling regarding SCS pain    B: Patient had surgery back on 8/11/20 for SCS implant    Per LOV notes from 9/16/2020:     PLAN:  -medrol dose pack for knee pain  -referral to Dr. Immanuel Alston with ortho if no improvement in next few weeks  -continue to m

## 2021-05-06 ENCOUNTER — TELEPHONE (OUTPATIENT)
Dept: PAIN CLINIC | Facility: CLINIC | Age: 56
End: 2021-05-06

## 2021-05-06 DIAGNOSIS — M54.42 CHRONIC BILATERAL LOW BACK PAIN WITH LEFT-SIDED SCIATICA: ICD-10-CM

## 2021-05-06 DIAGNOSIS — M96.1 FAILED BACK SYNDROME, LUMBOSACRAL: ICD-10-CM

## 2021-05-06 DIAGNOSIS — G89.29 CHRONIC BILATERAL LOW BACK PAIN WITH LEFT-SIDED SCIATICA: ICD-10-CM

## 2021-05-07 RX ORDER — AMITRIPTYLINE HYDROCHLORIDE 25 MG/1
25 TABLET, FILM COATED ORAL NIGHTLY
Qty: 30 TABLET | Refills: 2 | Status: SHIPPED | OUTPATIENT
Start: 2021-05-07

## 2021-05-07 NOTE — TELEPHONE ENCOUNTER
Medication:   Amitriptyline HCl 25 MG Oral Tab 30 tablet 2 2/2/2021    Sig:   Take 1 tablet (25 mg total) by mouth nightly.                Last office visit: 6/02/20  Due back to clinic per last office note:  na  Date next office visit scheduled:  none

## 2021-06-03 NOTE — TELEPHONE ENCOUNTER
New Hepatobiliary    Referred by: Dr. Hurt    Dx: HCC/cirrhosis of liver w/o ascites    Record in epic    Insurance in epic    Initial appointment scheduled with Dr. Ugarte on 06/15/21  New patient information/questionnaire mailed to verified home address   Patient returned call, advised of need to postpone due to [employer] rep not being available. Patient agreeable, procedure rescheduled, pre-procedure instructions were not reviewed.      Lead Pull OV also rescheduled    [address] ? Please note-No prescriptions will be written by Pain Clinic in OR on the day of procedure. If you require a refill of medications, please contact the office 48 hours prior to your procedure.   ? If you have an implanted Spinal Cord or Peripheral Nerve Sti Please schedule a follow up visit within 2 to 4 weeks after your last procedure date   Please call our office with any questions or concerns before or after your procedure at [phone].   If you are a diabetic, please increase the frequency of your

## 2021-08-07 DIAGNOSIS — Z98.890 S/P CARPAL TUNNEL RELEASE: ICD-10-CM

## 2021-08-09 RX ORDER — CYCLOBENZAPRINE HCL 10 MG
TABLET ORAL
Qty: 90 TABLET | Refills: 2 | Status: SHIPPED | OUTPATIENT
Start: 2021-08-09

## 2021-08-09 NOTE — TELEPHONE ENCOUNTER
Medication: Cyclobenzaprine    Date of last refill: 11/19/20 (#90/2)  [place]      Last office visit: 9/16/20  Due back to clinic per last office note:  -RTC prn  Date next office visit scheduled:    Future Appointments

## 2021-08-11 ENCOUNTER — TELEPHONE (OUTPATIENT)
Dept: PAIN CLINIC | Facility: CLINIC | Age: 56
End: 2021-08-11

## 2021-08-13 ENCOUNTER — HOSPITAL ENCOUNTER (OUTPATIENT)
Dept: MAMMOGRAPHY | Age: 56
Discharge: HOME | End: 2021-08-13
Attending: NURSE PRACTITIONER
Payer: COMMERCIAL

## 2021-08-13 DIAGNOSIS — Z12.31 ENCOUNTER FOR SCREENING MAMMOGRAM FOR MALIGNANT NEOPLASM OF BREAST: ICD-10-CM

## 2021-08-13 PROCEDURE — 77063 BREAST TOMOSYNTHESIS BI: CPT | Performed by: NURSE PRACTITIONER

## 2021-08-13 PROCEDURE — 77067 SCR MAMMO BI INCL CAD: CPT | Performed by: NURSE PRACTITIONER

## 2021-08-31 ENCOUNTER — HOSPITAL ENCOUNTER (OUTPATIENT)
Dept: MAMMOGRAPHY | Facility: HOSPITAL | Age: 56
Discharge: HOME | End: 2021-08-31
Attending: NURSE PRACTITIONER
Payer: COMMERCIAL

## 2021-08-31 DIAGNOSIS — R92.2 INCONCLUSIVE MAMMOGRAM: ICD-10-CM

## 2021-08-31 DIAGNOSIS — R92.8 ABNORMAL MAMMOGRAM: ICD-10-CM

## 2021-08-31 PROCEDURE — 76642 ULTRASOUND BREAST LIMITED: CPT | Performed by: NURSE PRACTITIONER

## 2021-08-31 PROCEDURE — 77065 DX MAMMO INCL CAD UNI: CPT | Performed by: NURSE PRACTITIONER

## 2021-08-31 PROCEDURE — 77061 BREAST TOMOSYNTHESIS UNI: CPT | Performed by: NURSE PRACTITIONER

## 2021-09-30 ENCOUNTER — LAB ENCOUNTER (OUTPATIENT)
Dept: LAB | Age: 56
End: 2021-09-30
Attending: FAMILY MEDICINE
Payer: COMMERCIAL

## 2021-09-30 DIAGNOSIS — Z20.828 EXPOSURE TO SARS VIRUS: ICD-10-CM

## 2022-03-21 ENCOUNTER — OFFICE VISIT (OUTPATIENT)
Dept: PODIATRY CLINIC | Facility: CLINIC | Age: 57
End: 2022-03-21
Payer: COMMERCIAL

## 2022-03-21 DIAGNOSIS — M19.072 ARTHROSIS OF MIDFOOT, LEFT: ICD-10-CM

## 2022-03-21 DIAGNOSIS — B35.1 ONYCHOMYCOSIS: ICD-10-CM

## 2022-03-21 PROCEDURE — 99203 OFFICE O/P NEW LOW 30 MIN: CPT | Performed by: PODIATRIST

## 2022-04-05 ENCOUNTER — TELEPHONE (OUTPATIENT)
Dept: ORTHOPEDICS CLINIC | Facility: CLINIC | Age: 57
End: 2022-04-05

## 2022-04-05 NOTE — TELEPHONE ENCOUNTER
----- Message from Aletha Cooper DPM sent at 4/4/2022 12:20 PM CDT -----  Results reviewed. Please inform patient that fungal culture results are positive and we should proceed with Lamisil tablet therapy. If the patient agrees we have to do a hepatic function panel, please place that order for me with a diagnosis of onychomycosis.

## 2022-04-19 LAB
ALBUMIN/GLOBULIN RATIO: 1.7 (CALC) (ref 1–2.5)
ALBUMIN: 4.3 G/DL (ref 3.6–5.1)
ALKALINE PHOSPHATASE: 91 U/L (ref 37–153)
ALT: 14 U/L (ref 6–29)
AST: 13 U/L (ref 10–35)
BILIRUBIN, DIRECT: 0.1 MG/DL
BILIRUBIN, INDIRECT: 0.7 MG/DL (CALC) (ref 0.2–1.2)
BILIRUBIN, TOTAL: 0.8 MG/DL (ref 0.2–1.2)
GLOBULIN: 2.5 G/DL (CALC) (ref 1.9–3.7)
PROTEIN, TOTAL: 6.8 G/DL (ref 6.1–8.1)

## 2022-04-21 ENCOUNTER — HOSPITAL ENCOUNTER (OUTPATIENT)
Dept: MAMMOGRAPHY | Facility: HOSPITAL | Age: 57
Discharge: HOME | End: 2022-04-21
Attending: NURSE PRACTITIONER
Payer: COMMERCIAL

## 2022-04-21 DIAGNOSIS — R92.8 ABNORMAL MAMMOGRAM: ICD-10-CM

## 2022-04-21 PROCEDURE — 77061 BREAST TOMOSYNTHESIS UNI: CPT | Performed by: NURSE PRACTITIONER

## 2022-04-21 PROCEDURE — 77065 DX MAMMO INCL CAD UNI: CPT | Performed by: NURSE PRACTITIONER

## 2022-04-28 RX ORDER — CYCLOBENZAPRINE HCL 10 MG
TABLET ORAL
Qty: 90 TABLET | Refills: 2 | Status: SHIPPED | OUTPATIENT
Start: 2022-04-28

## 2022-05-04 ENCOUNTER — TELEPHONE (OUTPATIENT)
Dept: PODIATRY CLINIC | Facility: CLINIC | Age: 57
End: 2022-05-04

## 2022-05-04 RX ORDER — TERBINAFINE HYDROCHLORIDE 250 MG/1
250 TABLET ORAL DAILY
Qty: 45 TABLET | Refills: 0 | Status: SHIPPED | OUTPATIENT
Start: 2022-05-04 | End: 2022-11-22

## 2022-05-04 NOTE — TELEPHONE ENCOUNTER
Pt is returning the nurses call. She wants her to know that she did receive her voicemail message and she has sched a f/up appt for 6/13/2022 at [facility].

## 2022-05-12 ENCOUNTER — LAB REQUISITION (OUTPATIENT)
Dept: LAB | Facility: HOSPITAL | Age: 57
End: 2022-05-12
Payer: COMMERCIAL

## 2022-05-12 PROCEDURE — 88305 TISSUE EXAM BY PATHOLOGIST: CPT | Performed by: PHYSICIAN ASSISTANT

## 2022-05-16 NOTE — ED AVS SNAPSHOT
Fredy Hernandez   MRN: [id number]    Department:  [facility] Emergency Department   Date of Visit:  5/1/2018           Disclosure     Insurance plans vary and the physician(s) referred by the ER may not be covered by your plan.  Please contact your tell this physician (or your personal doctor if your instructions are to return to your personal doctor) about any new or lasting problems. The primary care or specialist physician will see patients referred from the [facility] Emergency Department.  Arthor Bernheim medium/soft

## 2022-05-18 ENCOUNTER — HOSPITAL ENCOUNTER (OUTPATIENT)
Age: 57
Discharge: HOME | End: 2022-05-18
Payer: COMMERCIAL

## 2022-05-18 VITALS
HEART RATE: 79 BPM | WEIGHT: 235 LBS | TEMPERATURE: 98 F | BODY MASS INDEX: 36.88 KG/M2 | RESPIRATION RATE: 16 BRPM | HEIGHT: 67 IN | OXYGEN SATURATION: 98 % | DIASTOLIC BLOOD PRESSURE: 80 MMHG | SYSTOLIC BLOOD PRESSURE: 112 MMHG

## 2022-05-18 DIAGNOSIS — J06.9 VIRAL UPPER RESPIRATORY ILLNESS: Primary | ICD-10-CM

## 2022-05-18 LAB — SARS-COV-2 RNA RESP QL NAA+PROBE: NOT DETECTED

## 2022-05-18 PROCEDURE — 99212 OFFICE O/P EST SF 10 MIN: CPT

## 2022-05-18 PROCEDURE — 99213 OFFICE O/P EST LOW 20 MIN: CPT

## 2022-06-20 ENCOUNTER — OFFICE VISIT (OUTPATIENT)
Dept: PODIATRY CLINIC | Facility: CLINIC | Age: 57
End: 2022-06-20
Payer: COMMERCIAL

## 2022-06-20 DIAGNOSIS — B35.1 ONYCHOMYCOSIS: Primary | ICD-10-CM

## 2022-06-20 DIAGNOSIS — M19.072 ARTHROSIS OF MIDFOOT, LEFT: ICD-10-CM

## 2022-06-29 ENCOUNTER — TELEPHONE (OUTPATIENT)
Dept: PODIATRY CLINIC | Facility: CLINIC | Age: 57
End: 2022-06-29

## 2022-06-29 NOTE — TELEPHONE ENCOUNTER
Pt called stating pt had an appointment on 6-20-22. Today 6-29-22 [facility] to [facility] for [facility] test but they did not receive the order.   Unable to do the test.  Please fax order to [phone]

## 2022-07-08 ENCOUNTER — TELEPHONE (OUTPATIENT)
Dept: PODIATRY CLINIC | Facility: CLINIC | Age: 57
End: 2022-07-08

## 2022-07-11 NOTE — TELEPHONE ENCOUNTER
Patient returning call. States she would like to see if office can try getting results.  Please advise

## 2022-07-12 RX ORDER — TERBINAFINE HYDROCHLORIDE 250 MG/1
250 TABLET ORAL DAILY
Qty: 45 TABLET | Refills: 0 | Status: SHIPPED | OUTPATIENT
Start: 2022-07-12 | End: 2022-08-19 | Stop reason: ALTCHOICE

## 2022-07-12 NOTE — TELEPHONE ENCOUNTER
Very slight please prescribe the Lamisil tablets 250 mg each #45 instructions take 1 tablet daily p.o. and have her follow-up in 6 weeks

## 2022-07-12 NOTE — TELEPHONE ENCOUNTER
Dr. Robbi Mortensen patient's hepatic function panel received yesterday can you please review and advise on lamisil

## 2022-08-19 ENCOUNTER — OFFICE VISIT (OUTPATIENT)
Dept: PAIN CLINIC | Facility: CLINIC | Age: 57
End: 2022-08-19
Payer: COMMERCIAL

## 2022-08-19 ENCOUNTER — HOSPITAL ENCOUNTER (OUTPATIENT)
Dept: GENERAL RADIOLOGY | Facility: HOSPITAL | Age: 57
Discharge: HOME | End: 2022-08-19
Attending: PHYSICIAN ASSISTANT
Payer: COMMERCIAL

## 2022-08-19 VITALS
BODY MASS INDEX: 37 KG/M2 | SYSTOLIC BLOOD PRESSURE: 110 MMHG | WEIGHT: 235 LBS | HEART RATE: 88 BPM | DIASTOLIC BLOOD PRESSURE: 72 MMHG | OXYGEN SATURATION: 97 %

## 2022-08-19 DIAGNOSIS — M53.3 SACRAL PAIN: Primary | ICD-10-CM

## 2022-08-19 DIAGNOSIS — M53.3 SACRAL PAIN: ICD-10-CM

## 2022-08-19 PROCEDURE — 3074F SYST BP LT 130 MM HG: CPT | Performed by: PHYSICIAN ASSISTANT

## 2022-08-19 PROCEDURE — 99214 OFFICE O/P EST MOD 30 MIN: CPT | Performed by: PHYSICIAN ASSISTANT

## 2022-08-19 PROCEDURE — 3078F DIAST BP <80 MM HG: CPT | Performed by: PHYSICIAN ASSISTANT

## 2022-08-19 PROCEDURE — 72220 X-RAY EXAM SACRUM TAILBONE: CPT | Performed by: PHYSICIAN ASSISTANT

## 2022-08-19 NOTE — PROGRESS NOTES
Patient presents in office today with reported pain in back where battery for SCS is placed     Current pain level reported = 0/10    Last reported dose of none

## 2022-08-30 ENCOUNTER — OFFICE VISIT (OUTPATIENT)
Dept: PODIATRY CLINIC | Facility: CLINIC | Age: 57
End: 2022-08-30
Payer: COMMERCIAL

## 2022-08-30 DIAGNOSIS — B35.1 ONYCHOMYCOSIS: Primary | ICD-10-CM

## 2022-08-30 PROCEDURE — 99213 OFFICE O/P EST LOW 20 MIN: CPT | Performed by: PODIATRIST

## 2022-10-17 ENCOUNTER — HOSPITAL ENCOUNTER (OUTPATIENT)
Dept: MAMMOGRAPHY | Age: 57
Discharge: HOME | End: 2022-10-17
Attending: FAMILY MEDICINE
Payer: COMMERCIAL

## 2022-10-17 DIAGNOSIS — Z12.31 ENCOUNTER FOR SCREENING MAMMOGRAM FOR MALIGNANT NEOPLASM OF BREAST: ICD-10-CM

## 2022-10-17 PROCEDURE — 77063 BREAST TOMOSYNTHESIS BI: CPT | Performed by: FAMILY MEDICINE

## 2022-10-17 PROCEDURE — 77067 SCR MAMMO BI INCL CAD: CPT | Performed by: FAMILY MEDICINE

## 2022-11-23 ENCOUNTER — OFFICE VISIT (OUTPATIENT)
Dept: PODIATRY CLINIC | Facility: CLINIC | Age: 57
End: 2022-11-23
Payer: COMMERCIAL

## 2022-11-23 DIAGNOSIS — M19.072 ARTHROSIS OF MIDFOOT, LEFT: ICD-10-CM

## 2022-11-23 DIAGNOSIS — B35.1 ONYCHOMYCOSIS: Primary | ICD-10-CM

## 2022-11-23 PROCEDURE — 99213 OFFICE O/P EST LOW 20 MIN: CPT | Performed by: PODIATRIST

## 2023-01-10 DIAGNOSIS — Z98.890 S/P CARPAL TUNNEL RELEASE: ICD-10-CM

## 2023-01-10 RX ORDER — CYCLOBENZAPRINE HCL 10 MG
TABLET ORAL
Qty: 90 TABLET | Refills: 2 | OUTPATIENT
Start: 2023-01-10

## 2023-01-23 ENCOUNTER — OFFICE VISIT (OUTPATIENT)
Facility: CLINIC | Age: 58
End: 2023-01-23
Payer: COMMERCIAL

## 2023-01-23 VITALS
BODY MASS INDEX: 36.41 KG/M2 | HEIGHT: 67 IN | SYSTOLIC BLOOD PRESSURE: 110 MMHG | HEART RATE: 87 BPM | WEIGHT: 232 LBS | DIASTOLIC BLOOD PRESSURE: 80 MMHG | RESPIRATION RATE: 16 BRPM | OXYGEN SATURATION: 99 %

## 2023-01-23 DIAGNOSIS — Z99.89 OSA ON CPAP: ICD-10-CM

## 2023-01-23 DIAGNOSIS — G95.9 DISEASE OF SPINAL CORD (HCC): ICD-10-CM

## 2023-01-23 DIAGNOSIS — I10 PRIMARY HYPERTENSION: ICD-10-CM

## 2023-01-23 DIAGNOSIS — G47.33 OSA ON CPAP: ICD-10-CM

## 2023-01-23 DIAGNOSIS — G47.33 OBSTRUCTIVE SLEEP APNEA: Primary | ICD-10-CM

## 2023-01-23 PROCEDURE — 99214 OFFICE O/P EST MOD 30 MIN: CPT | Performed by: OTHER

## 2023-01-23 PROCEDURE — 3008F BODY MASS INDEX DOCD: CPT | Performed by: OTHER

## 2023-01-23 PROCEDURE — 3079F DIAST BP 80-89 MM HG: CPT | Performed by: OTHER

## 2023-01-23 PROCEDURE — 3074F SYST BP LT 130 MM HG: CPT | Performed by: OTHER

## 2023-01-23 NOTE — PATIENT INSTRUCTIONS
Please be advised:   Do not drive while sleepy   Take CPAP/BiPAP machine to any procedure that requires sedation     When should I clean my machine & supplies? Clean mask cushions or nasal pillow, headgear, tubing, and humidifier chamber with mild soap (Allison) and water   If water chamber has hard water buildup (white crust), soak in warm water & vinegar mix 50/50. Rinse and hang dry     DAILY   Wipe mask cushions or nasal pillow   Empty & rinse water chamber- refill with distilled water     WEEKLY   Clean mask cushions or nasal pillow, headgear, tubing, and humidifier chamber with mild soap (Allison) and water   If water chamber has hard water buildup (white crust), soak in warm water & vinegar mix 50/50. Rinse and hang dry     When should supplies be replaced? Contact your home care company for replacement supplies, or if your machine is malfunctioning   *Below is a general guideline of what we recommend. Replacement of supplies differs depending on your insurance company*   MONTHLY: Replace filter and mask cushion   6 MONTHS: Replace headgear and tubing     Travel Tips   Keep CPAP/BiPAP in original bag when traveling, and place luggage tag on bag   Most airlines consider CPAP/BiPAP to be a medical device, therefore it is a free carry-on item   If unable to get distilled water, bottled water is safe while traveling.  DO NOT use tap water   When traveling outside the [place] only a power adapter is necessary (CPAP can operate without a converter), bring an extension cord   Consider purchasing or renting a travel CPAP (not covered by insurance)     Dry Mouth/Nose   Turn up the humidity on your machine (select \"Options\" from the home screen)   Place a cool mist humidifier at your bedside   Over-the-counter remedies: Biotene, XyliMelts, NasoGel     Air Leak   Try adjusting your mask/headgear while laying in sleeping position vs. sitting up   Wash and dry your face prior to putting your mask on   If applicable: shave facial hair at night (or before wearing CPAP)   Purchase \"RemZzzs\" (through home care co., [employer], or remzzzs. com)   100% cotton knit barrier that goes between your mask cushion and your skin   Replace your mask cushion (at least once per month) and/or headgear (every 3-6 months)     Nasal Congestion   CPAP therapy can cause nasal passages to dry out, & mucous membranes try to protect the nasal passages by producing excess mucous, so congestion results. Over-the counter remedies: Flonase, Nasacort, Sinus Rinses (Neti-Pot), DO NOT USE Afrin   Try a mask that goes over the nose and mouth     Skin Irritation   Clean supplies regularly (Citrus II Mask Wipes, Control III disinfectant solution)   Try over the counter creams such as hydrocortisone 1% (apply in the morning after showering)   Your headgear may be too tight, replace supplies so you don't need to adjust so tightly   Try RemZzzs (100% cotton knit barrier that goes between your mask cushion and your skin)     Gas/Bloating   Try a different sleeping position to keep air out of the stomach. Lay on the left side or rotate to the right side. Incline with pillows or lay flat.    Over-the-counter remedies: Simethicone

## 2023-01-24 DIAGNOSIS — G47.33 OSA (OBSTRUCTIVE SLEEP APNEA): Primary | ICD-10-CM

## 2023-02-26 NOTE — LETTER
3/20/2019        RE: Fredy Hernandez     : 1965    Dear MADYSON Hatch,    This letter is to inform you that your patient is being scheduled for surgery with Dr. Lissy Villafuerte on 19 at [facility]. Patient already has an appo yes

## 2023-03-22 ENCOUNTER — MED REC SCAN ONLY (OUTPATIENT)
Facility: CLINIC | Age: 58
End: 2023-03-22

## 2023-04-11 ENCOUNTER — TELEPHONE (OUTPATIENT)
Dept: PHYSICAL THERAPY | Facility: HOSPITAL | Age: 58
End: 2023-04-11

## 2023-04-12 ENCOUNTER — OFFICE VISIT (OUTPATIENT)
Dept: PHYSICAL THERAPY | Age: 58
End: 2023-04-12
Attending: PHYSICIAN ASSISTANT
Payer: COMMERCIAL

## 2023-04-12 DIAGNOSIS — M53.3 SACRAL PAIN: ICD-10-CM

## 2023-04-12 PROCEDURE — 97140 MANUAL THERAPY 1/> REGIONS: CPT

## 2023-04-12 PROCEDURE — 97161 PT EVAL LOW COMPLEX 20 MIN: CPT

## 2023-04-17 ENCOUNTER — OFFICE VISIT (OUTPATIENT)
Dept: PHYSICAL THERAPY | Age: 58
End: 2023-04-17
Attending: PHYSICIAN ASSISTANT
Payer: COMMERCIAL

## 2023-04-17 PROCEDURE — 97110 THERAPEUTIC EXERCISES: CPT

## 2023-04-17 PROCEDURE — 97140 MANUAL THERAPY 1/> REGIONS: CPT

## 2023-04-19 ENCOUNTER — OFFICE VISIT (OUTPATIENT)
Dept: PHYSICAL THERAPY | Age: 58
End: 2023-04-19
Attending: PHYSICIAN ASSISTANT
Payer: COMMERCIAL

## 2023-04-19 PROCEDURE — 97140 MANUAL THERAPY 1/> REGIONS: CPT

## 2023-04-19 PROCEDURE — 97110 THERAPEUTIC EXERCISES: CPT

## 2023-04-24 ENCOUNTER — OFFICE VISIT (OUTPATIENT)
Dept: PHYSICAL THERAPY | Age: 58
End: 2023-04-24
Attending: PHYSICIAN ASSISTANT
Payer: COMMERCIAL

## 2023-04-24 PROCEDURE — 97140 MANUAL THERAPY 1/> REGIONS: CPT

## 2023-04-24 PROCEDURE — 97110 THERAPEUTIC EXERCISES: CPT

## 2023-04-26 ENCOUNTER — OFFICE VISIT (OUTPATIENT)
Dept: PHYSICAL THERAPY | Age: 58
End: 2023-04-26
Attending: PHYSICIAN ASSISTANT
Payer: COMMERCIAL

## 2023-04-26 PROCEDURE — 97140 MANUAL THERAPY 1/> REGIONS: CPT

## 2023-04-26 PROCEDURE — 97110 THERAPEUTIC EXERCISES: CPT

## 2023-05-01 ENCOUNTER — OFFICE VISIT (OUTPATIENT)
Dept: PHYSICAL THERAPY | Age: 58
End: 2023-05-01
Attending: PHYSICIAN ASSISTANT
Payer: COMMERCIAL

## 2023-05-01 PROCEDURE — 97110 THERAPEUTIC EXERCISES: CPT

## 2023-05-03 ENCOUNTER — OFFICE VISIT (OUTPATIENT)
Dept: PHYSICAL THERAPY | Age: 58
End: 2023-05-03
Attending: PHYSICIAN ASSISTANT
Payer: COMMERCIAL

## 2023-05-03 PROCEDURE — 97110 THERAPEUTIC EXERCISES: CPT

## 2023-05-05 NOTE — TELEPHONE ENCOUNTER
Learning About Lung Cancer Screening  What is screening for lung cancer? Lung cancer screening is a way to find some lung cancers early, before a person has any symptoms of the cancer. Lung cancer screening may help those who have the highest risk for lung cancer--people age 48 and older who are or were heavy smokers. For most people, who aren't at increased risk, screening for lung cancer probably isn't helpful. Screening won't prevent cancer. And it may not find all lung cancers. Lung cancer screening may lower the risk of dying from lung cancer in a small number of people. How is it done? Lung cancer screening is done with a low-dose CT (computed tomography) scan. A CT scan uses X-rays, or radiation, to make detailed pictures of your body. Experts recommend that screening be done in medical centers that focus on finding and treating lung cancer. Who is screening recommended for? Lung cancer screening is recommended for people age 48 and older who are or were heavy smokers. That means people with a smoking history of at least 20 pack years. A pack year is a way to measure how heavy a smoker you are or were. To figure out your pack years, multiply how many packs a day on average (assuming 20 cigarettes per pack) you have smoked by how many years you have smoked. For example: If you smoked 1 pack a day for 20 years, that's 1 times 20. So you have a smoking history of 20 pack years. If you smoked 2 packs a day for 10 years, that's 2 times 10. So you have a smoking history of 20 pack years. Experts agree that screening is for people who have a high risk of lung cancer. But experts don't agree on what high risk means. Some say people age 48 or older with at least a 20-pack-year smoking history are high risk. Others say it's people age 54 or older with a 30-pack-year history. To see if you could benefit from screening, first find out if you are at high risk for lung cancer.  Your doctor can help you Referral placed in EPIC

## 2023-05-08 ENCOUNTER — OFFICE VISIT (OUTPATIENT)
Dept: PHYSICAL THERAPY | Age: 58
End: 2023-05-08
Attending: PHYSICIAN ASSISTANT
Payer: COMMERCIAL

## 2023-05-08 PROCEDURE — 97110 THERAPEUTIC EXERCISES: CPT

## 2023-05-10 PROCEDURE — 88305 TISSUE EXAM BY PATHOLOGIST: CPT | Performed by: PHYSICIAN ASSISTANT

## 2023-05-11 ENCOUNTER — LAB REQUISITION (OUTPATIENT)
Dept: LAB | Facility: HOSPITAL | Age: 58
End: 2023-05-11
Payer: COMMERCIAL

## 2023-05-11 DIAGNOSIS — D48.5 NEOPLASM OF UNCERTAIN BEHAVIOR OF SKIN: ICD-10-CM

## 2023-05-31 ENCOUNTER — OFFICE VISIT (OUTPATIENT)
Dept: PHYSICAL THERAPY | Age: 58
End: 2023-05-31
Attending: PHYSICIAN ASSISTANT
Payer: COMMERCIAL

## 2023-05-31 PROCEDURE — 97110 THERAPEUTIC EXERCISES: CPT

## 2023-06-08 ENCOUNTER — MED REC SCAN ONLY (OUTPATIENT)
Facility: CLINIC | Age: 58
End: 2023-06-08

## 2023-07-10 ENCOUNTER — APPOINTMENT (OUTPATIENT)
Dept: PHYSICAL THERAPY | Age: 58
End: 2023-07-10
Attending: PHYSICIAN ASSISTANT
Payer: COMMERCIAL

## 2023-07-17 ENCOUNTER — TELEPHONE (OUTPATIENT)
Dept: PHYSICAL THERAPY | Facility: HOSPITAL | Age: 58
End: 2023-07-17

## 2023-08-21 ENCOUNTER — OFFICE VISIT (OUTPATIENT)
Dept: PHYSICAL THERAPY | Age: 58
End: 2023-08-21
Attending: PHYSICIAN ASSISTANT
Payer: COMMERCIAL

## 2023-08-21 PROCEDURE — 97110 THERAPEUTIC EXERCISES: CPT

## 2023-08-21 NOTE — PROGRESS NOTES
Referring Diagnosis: Sacral pain (M53.3)   Referring Provider: Pako Young Date of Evaluation: 4/12/2023   Precautions: Sean Skaggs Next MD visit: none scheduled   Date of Surgery: n/a    Insurance Primary/Secondary: [employer] P[place] / N/A     # Auth Visits: 12 POC      Discharge Summary  Pt has attended 10 visits in Physical Therapy. Subjective: Was doing well since last session, was able to exercise 3-4 days/week up until July 4th. Since then was traveling and had a change in a routine. No siginficant flare ups or set back since last session. Not having as much pain with walking, but  to lay on left side. Current Pain: 0/10    Objective:   Lumbar AROM:  Flexion: fingers to distal shin  Extension: WNL  Right Rotation: WNL  Left Rotation: WNL  Right Lateral Flexion: WNL  Left Lateral Flexion: WNL    Strength/MMT:  Hip   Extension: R 5/5; L 5/5  Abduction: R 5/5; L 5/5     Assessment: Good progress since starting this episode of care. Able to perform ADLs/exercises without symptoms. She has met all established goals. There was a gap between today and prior session - there was an initial hold to allow time with independent HEP, but follow-up had to be delayed due to unexpected travel. Goals:   Pt will improve left hip ABD strength to 5/5 (5 visits) Met  Pt will be able to ambulate >10 minutes without worsening symptoms (8 visits) Met  Pt will be able to complete sit to stand x5 reps without worsening symptoms (8 visits) Met  Pt will be able to complete >10 minutes of yard work without worsening symptoms (8 visits) Met    Plan: Discharge to independent HEP. Patient was advised of these findings, precautions, and treatment options and has agreed to actively participate in planning and for this course of care. Thank you for your referral. If you have any questions, please contact me at Dept: [phone].     Sincerely,  Electronically signed by therapist: Grace Montana, PT, DPT     Date:  4/24/2023  TX#: 4 Date:  4/26/2023  TX#: 5 Date:  5/1/2023  TX#: 6 Date:  5/3/2023  TX#: 7 Date:  5/8/2023  TX#: 8 Date:  5/31/2023  TX#: 9 Date:  8/21/2023  TX#: 9   TherEx:  -Quadruped rock back x30 reps  -Sit to stand x5 reps  -Leg press L8 2x10 reps  -Resisted lateral stepping, red band, R/L 4x10 steps  -Hip hike on step, R/L 2x10 reps TherEx:  -Quadruped rock back x30 reps  -Sit to stand x5 reps  -Leg press L8 2x10 reps  -Resisted lateral stepping, red band, R/L 4x10 steps  -Hip hike on step, R/L 2x10 reps TherEx:  -Quadruped rock back x30 reps  -Sit to stand x5 reps  -Leg press L8 2x10 reps  -Resisted lateral stepping, red band, R/L 4x10 steps  -Mini squat on flat BOSU  -SLS balance disc, R/L 3x30 sec TherEx:  -Leg press L8 3x10 reps  -Resisted lateral stepping, red band, R/L 4x10 steps  -Mini squat on flat BOSU  -Round BOSU step up 2x10 reps  -BOSU push-up from knees 2x10 reps TherEx:  -Leg press L8 3x10 reps  -Resisted lateral stepping, red band, R/L 4x10 steps  -Mini squat on flat BOSU  -Round BOSU step up 2x10 reps  -BOSU push-up from knees 2x10 reps  -Pull down, grey band, 2x10 reps TherEx:  -Leg press L8 3x10 reps  -Resisted lateral stepping, red band, R/L 4x10 steps  -Mini squat on flat BOSU  -Round BOSU step up 2x10 reps  -BOSU push-up from knees 2x10 reps  -Pull down, grey band, 2x10 reps TherEx:  -Lumbar reassessment x10 min  -Patient education on proper exercise progress, expected course of independent HEP x5 min  -Squat x15 reps  -Lunge R/L x15 reps  -SLS R/L 2x30 sec   Manual:  -Left hip PA gr III mobilization x10 min Manual:  -Left hip PA gr III mobilization x10 min  -  -  -  -  -     HEP: walking routine, resisted lateral stepping, pull down, BOSU push-up, SLS on balance disc, BOSU step up    Charges:  TherEx x2 (30 min)       Total Timed Treatment: 30 min  Total Treatment Time: 30 min

## 2023-09-01 ENCOUNTER — HOSPITAL ENCOUNTER (OUTPATIENT)
Dept: MAMMOGRAPHY | Facility: HOSPITAL | Age: 58
Discharge: HOME | End: 2023-09-01
Payer: COMMERCIAL

## 2023-09-01 DIAGNOSIS — N64.4 BREAST PAIN, RIGHT: ICD-10-CM

## 2023-09-01 PROCEDURE — 77066 DX MAMMO INCL CAD BI: CPT

## 2023-09-01 PROCEDURE — 77062 BREAST TOMOSYNTHESIS BI: CPT

## 2023-09-01 NOTE — PROGRESS NOTES
Routine mammogram and clinical evaluation in 12 months  For continued breast pain follow up with Dr. Moi Rhodes for further evaluation

## 2023-12-04 PROCEDURE — 88175 CYTOPATH C/V AUTO FLUID REDO: CPT

## 2023-12-04 PROCEDURE — 87624 HPV HI-RISK TYP POOLED RSLT: CPT

## 2023-12-19 RX ORDER — FAMOTIDINE 20 MG/1
20 TABLET, FILM COATED ORAL 2 TIMES DAILY
COMMUNITY

## 2024-01-17 ENCOUNTER — ANESTHESIA EVENT (OUTPATIENT)
Dept: ENDOSCOPY | Facility: HOSPITAL | Age: 59
End: 2024-01-17
Payer: COMMERCIAL

## 2024-01-18 ENCOUNTER — HOSPITAL ENCOUNTER (OUTPATIENT)
Facility: HOSPITAL | Age: 59
Setting detail: HOSPITAL OUTPATIENT SURGERY
Discharge: HOME | End: 2024-01-18
Attending: STUDENT IN AN ORGANIZED HEALTH CARE EDUCATION/TRAINING PROGRAM | Admitting: STUDENT IN AN ORGANIZED HEALTH CARE EDUCATION/TRAINING PROGRAM
Payer: COMMERCIAL

## 2024-01-18 ENCOUNTER — ANESTHESIA (OUTPATIENT)
Dept: ENDOSCOPY | Facility: HOSPITAL | Age: 59
End: 2024-01-18
Payer: COMMERCIAL

## 2024-01-18 VITALS
HEART RATE: 65 BPM | BODY MASS INDEX: 35.47 KG/M2 | RESPIRATION RATE: 16 BRPM | WEIGHT: 226 LBS | DIASTOLIC BLOOD PRESSURE: 74 MMHG | OXYGEN SATURATION: 95 % | SYSTOLIC BLOOD PRESSURE: 114 MMHG | TEMPERATURE: 97 F | HEIGHT: 67 IN

## 2024-01-18 DIAGNOSIS — R10.31 RLQ ABDOMINAL PAIN: ICD-10-CM

## 2024-01-18 DIAGNOSIS — R13.10 DYSPHAGIA, UNSPECIFIED TYPE: ICD-10-CM

## 2024-01-18 PROCEDURE — 0DB68ZX EXCISION OF STOMACH, VIA NATURAL OR ARTIFICIAL OPENING ENDOSCOPIC, DIAGNOSTIC: ICD-10-PCS | Performed by: STUDENT IN AN ORGANIZED HEALTH CARE EDUCATION/TRAINING PROGRAM

## 2024-01-18 PROCEDURE — 0DJD8ZZ INSPECTION OF LOWER INTESTINAL TRACT, VIA NATURAL OR ARTIFICIAL OPENING ENDOSCOPIC: ICD-10-PCS | Performed by: STUDENT IN AN ORGANIZED HEALTH CARE EDUCATION/TRAINING PROGRAM

## 2024-01-18 PROCEDURE — 88305 TISSUE EXAM BY PATHOLOGIST: CPT | Performed by: STUDENT IN AN ORGANIZED HEALTH CARE EDUCATION/TRAINING PROGRAM

## 2024-01-18 RX ORDER — LIDOCAINE HYDROCHLORIDE 10 MG/ML
INJECTION, SOLUTION EPIDURAL; INFILTRATION; INTRACAUDAL; PERINEURAL AS NEEDED
Status: DISCONTINUED | OUTPATIENT
Start: 2024-01-18 | End: 2024-01-18 | Stop reason: SURG

## 2024-01-18 RX ORDER — SODIUM CHLORIDE, SODIUM LACTATE, POTASSIUM CHLORIDE, CALCIUM CHLORIDE 600; 310; 30; 20 MG/100ML; MG/100ML; MG/100ML; MG/100ML
INJECTION, SOLUTION INTRAVENOUS CONTINUOUS
Status: DISCONTINUED | OUTPATIENT
Start: 2024-01-18 | End: 2024-01-18

## 2024-01-18 RX ORDER — OMEPRAZOLE 20 MG/1
20 CAPSULE, DELAYED RELEASE ORAL
COMMUNITY

## 2024-01-18 RX ADMIN — SODIUM CHLORIDE, SODIUM LACTATE, POTASSIUM CHLORIDE, CALCIUM CHLORIDE: 600; 310; 30; 20 INJECTION, SOLUTION INTRAVENOUS at 08:20:00

## 2024-01-18 RX ADMIN — LIDOCAINE HYDROCHLORIDE 25 MG: 10 INJECTION, SOLUTION EPIDURAL; INFILTRATION; INTRACAUDAL; PERINEURAL at 08:23:00

## 2024-01-18 NOTE — ANESTHESIA PREPROCEDURE EVALUATION
PRE-OP EVALUATION    Patient Name: Marilin Merida    Admit Diagnosis: RLQ abdominal pain [R10.31]  Dysphagia, unspecified type [R13.10]    Pre-op Diagnosis: RLQ abdominal pain [R10.31]  Dysphagia, unspecified type [R13.10]    ESOPHAGOGASTRODUODENOSCOPY (EGD), COLONOSCOPY    Anesthesia Procedure: ESOPHAGOGASTRODUODENOSCOPY (EGD), COLONOSCOPY  COLONOSCOPY    Surgeon(s) and Role:     * Jesus Cuellar MD - Primary    Pre-op vitals reviewed.  Temp: 97.3 °F (36.3 °C)  Pulse: 90  Resp: 16  BP: 111/86  SpO2: 100 %  Body mass index is 35.4 kg/m².    Current medications reviewed.  Hospital Medications:   lactated ringers infusion   Intravenous Continuous       Outpatient Medications:     Medications Prior to Admission   Medication Sig Dispense Refill Last Dose    omeprazole 20 MG Oral Capsule Delayed Release Take 1 capsule (20 mg total) by mouth every morning before breakfast.   Past Week    famotidine 20 MG Oral Tab Take 1 tablet (20 mg total) by mouth 2 (two) times daily.   Past Month    DULoxetine (CYMBALTA) 60 MG Oral Cap DR Particles Take 1 capsule (60 mg total) by mouth daily. To be taken with a 30 mg capsule. 90 capsule 1 1/17/2024    DULoxetine 30 MG Oral Cap DR Particles TAKE ONE CAPSULE BY MOUTH DAILY. TO BE TAKEN WITH A 60MG CAPSULE. 90 capsule 1 1/17/2024    lisinopril 5 MG Oral Tab Take 1 tablet (5 mg total) by mouth daily.   1/17/2024    tiZANidine 4 MG Oral Tab Take 0.5 tablets (2 mg total) by mouth nightly.   1/17/2024    Amitriptyline HCl 25 MG Oral Tab Take 1 tablet (25 mg total) by mouth nightly. 30 tablet 2 1/17/2024    Triamterene-HCTZ 37.5-25 MG Oral Cap Take 1 capsule by mouth daily.  0 1/18/2024    atorvastatin 10 MG Oral Tab Take 1 tablet (10 mg total) by mouth daily.   1/17/2024    Fexofenadine HCl 180 MG Oral Tab Take 1 tablet (180 mg total) by mouth daily.   1/17/2024    PEG 3350-KCl-Na Bicarb-NaCl 420 g Oral Recon Soln Take 4,000 mL by mouth As Directed. (Patient not taking: Reported on  2023) 1 each 0        Allergies: Ceclor and Lexapro [escitalopram]      Anesthesia Evaluation    Patient summary reviewed.    Anesthetic Complications  (-) history of anesthetic complications         GI/Hepatic/Renal      (+) GERD                      (+) irritable bowel syndrome     Cardiovascular        Exercise tolerance: good     MET: >4    (+) obesity  (+) hypertension   (+) hyperlipidemia                                  Endo/Other    Negative endo/other ROS.                              Pulmonary                    (+) sleep apnea and CPAP      Neuro/Psych      (+) depression                                Past Surgical History:   Procedure Laterality Date    ADDL NECK SPINE FUSION  2017    CERVICAL 4-CERVICAL 5, CERVICAL 5-CERVICAL 6, CERVICAL 6-CERVICAL 7 ANTERIOR DISCECTOMY AND FUSION WITH INSTRUMENTATION AND MICROSCOPE DISSECTIONN/[facility]- Dr. Martinez    BACK SURGERY  2017, 2018    fusion; lumbar and cervical      X 1    CARPAL TUNNEL RELEASE Bilateral 2019,2019    CHOLECYSTECTOMY      COLONOSCOPY  2014    EXCIS LUMBAR DISK,ONE LEVEL      OTHER  2019    left carpal tunnel release    OTHER  2019    right carpal tunnel release    OTHER SURGICAL HISTORY      SPINE SURGERY PROCEDURE UNLISTED      CERVICLE    SPINE SURGERY PROCEDURE UNLISTED  2018    LUMBAR    STEREOTACT STIM SPINAL CORD      TONSILLECTOMY       Social History     Socioeconomic History    Marital status:    Tobacco Use    Smoking status: Every Day     Packs/day: 1.00     Years: 30.00     Additional pack years: 0.00     Total pack years: 30.00     Types: Cigarettes    Smokeless tobacco: Never   Vaping Use    Vaping Use: Never used   Substance and Sexual Activity    Alcohol use: Yes     Comment: rarely, a few times a year    Drug use: Yes     Frequency: 7.0 times per week     Types: Cannabis     Comment: daily 2021    Sexual activity: Not Currently   Other Topics Concern    Caffeine  Concern Yes     Comment: 1 cup daily coffee    Exercise No     Comment: PT  2 x per week    Reaction to local anesthetic No     History   Drug Use    Frequency: 7.0 times per week    Types: Cannabis     Comment: daily 08/2021     Available pre-op labs reviewed.               Airway      Mallampati: II  Mouth opening: >3 FB  TM distance: > 6 cm  Neck ROM: full Cardiovascular    Cardiovascular exam normal.  Rhythm: regular  Rate: normal     Dental    Dentition appears grossly intact         Pulmonary    Pulmonary exam normal.  Breath sounds clear to auscultation bilaterally.               Other findings              ASA: 2   Plan: MAC  NPO status verified and patient meets guidelines.  Patient has not taken beta blockers in last 24 hours.  Post-procedure pain management plan discussed with surgeon and patient.      Plan/risks discussed with: patient                Present on Admission:  **None**

## 2024-01-18 NOTE — OPERATIVE REPORT
Colon operative report  Patient Name: Marilin Merida  Procedure: Colonoscopy   Indication: history of colon polyps (2012 and 2014; neg exam in 2019)  Post-Op Dx: Diverticulosis without complications  Attending: Jesus Cuellar M.D.  Consent: The risks, benefits, and alternatives were discussed with the patient / POA.  Risks included, but were not limited to, bleeding, perforation, medication effects, cardiac arrhythmias, missed polyps, and aspiration.  After all questions were answered to their satisfaction, a signed, informed, and witnessed consent was obtained.  Sedation: Monitored Anesthesia Care  Monitoring: Pulsoximetry, pulse, respirations, and blood pressure were monitored throughout the entire procedure    Preparation Quality: fair.  [place] Bowel Prep Score: Right 2 / Transverse 2 / Left 3   Procedure: After achieving adequate sedation, and placing the patient in the left lateral decubitus position, a digital rectal examination was performed.  The lubricated tip of the pediatric colonoscope was then introduced into the rectum and advanced to the terminal ileum.  The appendiceal orifice and ileocecal valve were clearly and distinctly visualized, thus verifying the cecum.  The terminal ileum was intubated and found to be normal to the extent examined.  The endoscope was then carefully withdrawn from the patient with careful visualization of the colonic mucosa revealing no additional pathologic findings.  Air was suctioned to the best of my ability, during withdrawal of the endoscope.  When the endoscope reached the rectum, it was placed in a retroflexed position, and the rectal bulb was thus visualized.  The endoscope was righted, and then removed from the patient.  The patient tolerated the procedure without apparent procedural complications.  The patient left the procedure room in stable condition for recovery.  Findings:    Looping throughout the right colon  Copious amounts of solid fecal debris and seeds  in the cecum and the proximal ascending colon, lavaged to the best of my ability  Single diverticulum noted in the proximal ascending colon  Numerous diverticuli noted in the sigmoid colon  Moderate sized internal hemorrhoids and external hemorrhoidal skin tags   Impression: Findings as above.    Recommendations:   High fiber diet  Daily probiotics  Repeat colonoscopy in 7 years for cancer screening    Jesus Cuellar MD

## 2024-01-18 NOTE — OPERATIVE REPORT
EGD operative report  Patient Name: Marilin Merida  Procedure: Esophagogastroduodenoscopy with biopsy   Indication: reflux / GERD  Post-Op Dx: antral gastritis, hiatal hernia  Attending: Jesus Cuellar M.D.  Consent:  The risks, benefits, and alternatives were discussed with the patient / POA.  Risks included, but were not limited to, bleeding, perforation, medication effects, cardiac arrhythmias, and aspiration.  After all questions were answered to their satisfaction, a signed, informed, and witnessed consent was obtained.  Sedation: Monitored Anesthesia Care  Monitoring:  Pulsoximetry, pulse, respirations, and blood pressure were monitored throughout the entire procedure  Procedure: After achieving adequate sedation and placing the patient in the left lateral decubitus position, the lubricated upper endoscope was introduced into the mouth and advanced to the descending duodenum.  The endoscope was then withdrawn into the gastric antrum and placed in a retroflexed position.  The endoscope was then righted, and air was suctioned from the stomach.  The endoscope was then withdrawn from the patient, with careful visual inspection of the mucosa revealing no additional pathologic findings.  The patient tolerated the procedure without apparent procedural complications.  The patient left the procedure room in stable condition for recovery.  Findings:   Esophagus: The mucosa was normal.  There were no active reflux changes noted.  There are no luminal abnormalities.  GE junction: There is a 2 cm sliding type hiatal hernia.     Stomach:  There was patchy erythema in the gastric antrum.  The gastric body, fundus, cardia, and angularis were normal.  Biopsies were obtained from the antrum, body, and fundus, to evaluate for H.pylori.   Duodenum: The duodenal bulb, post-bulbar duodenum, and descending duodenum were normal.  Impression: Findings as above.  Recommendations:   Continue omeprazole at night  Weight loss through  diet and exercise  Repeat EGD at time of next colonoscopy    Jesus Cuellar MD
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
## 2024-01-18 NOTE — ANESTHESIA POSTPROCEDURE EVALUATION
[facility]    Marilin Merida Patient Status:  Hospital Outpatient Surgery   Age/Gender 58 year old female MRN [id number]   Location [facility] Attending Jesus Cuellar MD   Hosp Day # 0 PCP Viktor Palomares MD       Anesthesia Post-op Note    ESOPHAGOGASTRODUODENOSCOPY (EGD) with biopsies, COLONOSCOPY    Procedure Summary       Date: 01/18/24 Room / Location:  ENDOSCOPY 02 /  ENDOSCOPY    Anesthesia Start: 0820 Anesthesia Stop: 0901    Procedures:       ESOPHAGOGASTRODUODENOSCOPY (EGD) with biopsies, COLONOSCOPY      COLONOSCOPY Diagnosis:       RLQ abdominal pain      Dysphagia, unspecified type      (EGD: antral gastritis, hiatal hernia COLON: diverticulosis of large intestine without complications)    Surgeons: Jesus Cuellar MD Anesthesiologist: Jasper Maguire MD    Anesthesia Type: MAC ASA Status: 2            Anesthesia Type: MAC    Vitals Value Taken Time   /76 01/18/24 0904   Temp 98.0 01/18/24 0906   Pulse 75 01/18/24 0905   Resp 16 01/18/24 0906   SpO2 100 % 01/18/24 0905   Vitals shown include unfiled device data.    Patient Location: Endoscopy    Anesthesia Type: MAC    Airway Patency: patent    Postop Pain Control: adequate    Mental Status: mildly sedated but able to meaningfully participate in the post-anesthesia evaluation    Nausea/Vomiting: none    Cardiopulmonary/Hydration status: stable euvolemic    Complications: no apparent anesthesia related complications    Postop vital signs: stable    Dental Exam: Unchanged from Preop    Patient to be discharged home when criteria met.

## 2024-01-18 NOTE — DISCHARGE INSTRUCTIONS
Per Dr Cuellar  There is a small hiatal hernia.  This will cause acid reflux, and would explain your night time reflux symptoms.  Continue omeprazole 20 mg tablet once daily, 30 min prior to dinner (if you forget, take prior to bedtime).  Weight loss through diet and exercise will reduce frequency of reflux and dependence on medications.  Colonoscopy is notable for diverticulosis, but no active diverticulitis.    There are no polyps on this exam.  There were polyps in 2012 and 2014.  The prep quality was limited in the right colon.  Plan for repeat EGD and colonoscopy in 7 years.          Home Care Instructions for Colonoscopy and/or Gastroscopy with Sedation    Diet:  - Resume your regular diet as tolerated unless otherwise instructed.  - Start with light meals to minimize bloating.  - Do not drink alcohol today.    Medication:  - If you have questions about resuming your normal medications, please contact your Primary Care Physician.    Activities:  - Take it easy today. Do not return to work today.  - Do not drive today.  - Do not operate any machinery today (including kitchen equipment).    Colonoscopy:  - You may notice some rectal \"spotting\" (a little blood on the toilet tissue) for a day or two after the exam. This is normal.  - If you experience any rectal bleeding (not spotting), persistent tenderness or sharp severe abdominal pains, oral temperature over 100 degrees Fahrenheit, light-headedness or dizziness, or any other problems, contact your doctor.    Gastroscopy:  - You may have a sore throat for 2-3 days following the exam. This is normal. Gargling with warm salt water (1/2 tsp salt to 1 glass warm water) or using throat lozenges will help.  - If you experience any sharp pain in your neck, abdomen or chest, vomiting of blood, oral temperature over 100 degrees Fahrenheit, light-headedness or dizziness, or any other problems, contact your doctor.    **If unable to reach your doctor, please go to the  [facility] Emergency Room**    - Your referring physician will receive a full report of your examination.  - If you do not hear from your doctor's office within two weeks of your biopsy, please call them for your results.    You may be able to see your laboratory results in [facility] between 4 and 7 business days.  In some cases, your physician may not have viewed the results before they are released to [employer].  If you have questions regarding your results contact the physician who ordered the test/exam by phone or via [employer] by choosing \"Ask a Medical Question.\"

## 2024-01-22 NOTE — PROGRESS NOTES
Susan,  Patient needs repeat EGD and colonoscopy in 7 years.  Please enter recall and update health maintenance.    Thanks,  PM

## 2024-01-22 NOTE — PROGRESS NOTES
Marilin    No worrisome findings on your biopsies.  Follow-up in office with any issues or concerns.  Please call with any questions,    Jesus Cuellar MD

## 2024-01-23 NOTE — PROGRESS NOTES
Marilin Merida  2022 - 2022  Patient ID: [id number]  : 1965  Age: 58 years  [facility]  Compliance Report  Usage 2022 - 2022  Usage days 89/90 days (99%)  >= 4 hours 89 days (99%)  < 4 hours 0 days (0%)  Usage hours 818 hours 32 minutes  Average usage (total days) 9 hours 6 minutes  Average usage (days used) 9 hours 12 minutes  Median usage (days used) 9 hours 2 minutes  AirSense 10 AutoSet  Serial number [id number]  Mode AutoSet  Min Pressure 9 cmH2O  Max Pressure 18 cmH2O  EPR Fulltime  EPR level 3  Therapy  Pressure - cmH2O Median: 13.5 95th percentile: 17.0 Maximum: 17.8  Leaks - L/min Median: 0.0 95th percentile: 0.0 Maximum: 74.2  Events per hour AI: 1.4 HI: 0.3 AHI: 1.7  Apnea Index Central: 0.0 Obstructive: 1.2 Unknown: 0.1  RERA Index 0.6  Cheyne-Wilkinson respiration (average duration per night) 0 minutes (0%)

## 2024-01-24 ENCOUNTER — OFFICE VISIT (OUTPATIENT)
Facility: CLINIC | Age: 59
End: 2024-01-24
Payer: COMMERCIAL

## 2024-01-24 VITALS
WEIGHT: 233 LBS | HEART RATE: 94 BPM | BODY MASS INDEX: 36.57 KG/M2 | RESPIRATION RATE: 18 BRPM | OXYGEN SATURATION: 98 % | HEIGHT: 67 IN | SYSTOLIC BLOOD PRESSURE: 118 MMHG | DIASTOLIC BLOOD PRESSURE: 70 MMHG

## 2024-01-24 DIAGNOSIS — E66.09 CLASS 2 OBESITY DUE TO EXCESS CALORIES WITHOUT SERIOUS COMORBIDITY WITH BODY MASS INDEX (BMI) OF 36.0 TO 36.9 IN ADULT: ICD-10-CM

## 2024-01-24 DIAGNOSIS — E78.5 DYSLIPIDEMIA: ICD-10-CM

## 2024-01-24 DIAGNOSIS — I10 PRIMARY HYPERTENSION: ICD-10-CM

## 2024-01-24 DIAGNOSIS — G47.33 OSA ON CPAP: Primary | ICD-10-CM

## 2024-01-24 PROBLEM — E66.812 CLASS 2 OBESITY DUE TO EXCESS CALORIES WITHOUT SERIOUS COMORBIDITY WITH BODY MASS INDEX (BMI) OF 36.0 TO 36.9 IN ADULT: Status: ACTIVE | Noted: 2024-01-24

## 2024-01-24 PROCEDURE — 3078F DIAST BP <80 MM HG: CPT | Performed by: OTHER

## 2024-01-24 PROCEDURE — 99214 OFFICE O/P EST MOD 30 MIN: CPT | Performed by: OTHER

## 2024-01-24 PROCEDURE — 3074F SYST BP LT 130 MM HG: CPT | Performed by: OTHER

## 2024-01-24 PROCEDURE — 3008F BODY MASS INDEX DOCD: CPT | Performed by: OTHER
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
## 2024-02-16 ENCOUNTER — OFFICE VISIT (OUTPATIENT)
Dept: SURGERY | Facility: CLINIC | Age: 59
End: 2024-02-16
Payer: COMMERCIAL

## 2024-02-16 VITALS
BODY MASS INDEX: 35.47 KG/M2 | HEIGHT: 67 IN | SYSTOLIC BLOOD PRESSURE: 122 MMHG | WEIGHT: 226 LBS | DIASTOLIC BLOOD PRESSURE: 82 MMHG

## 2024-02-16 DIAGNOSIS — Z96.89 S/P INSERTION OF SPINAL CORD STIMULATOR: ICD-10-CM

## 2024-02-16 DIAGNOSIS — G56.03 BILATERAL CARPAL TUNNEL SYNDROME: ICD-10-CM

## 2024-02-16 DIAGNOSIS — M54.50 LUMBAR BACK PAIN: ICD-10-CM

## 2024-02-16 DIAGNOSIS — R20.2 LEFT HAND PARESTHESIA: Primary | ICD-10-CM

## 2024-02-16 DIAGNOSIS — Z98.1 S/P CERVICAL SPINAL FUSION: ICD-10-CM

## 2024-02-16 DIAGNOSIS — Z98.890 S/P CARPAL TUNNEL RELEASE: ICD-10-CM

## 2024-02-16 PROCEDURE — 3074F SYST BP LT 130 MM HG: CPT | Performed by: NURSE PRACTITIONER

## 2024-02-16 PROCEDURE — 3008F BODY MASS INDEX DOCD: CPT | Performed by: NURSE PRACTITIONER

## 2024-02-16 PROCEDURE — 99204 OFFICE O/P NEW MOD 45 MIN: CPT | Performed by: NURSE PRACTITIONER

## 2024-02-16 PROCEDURE — 3079F DIAST BP 80-89 MM HG: CPT | Performed by: NURSE PRACTITIONER

## 2024-02-16 NOTE — PROGRESS NOTES
New patient:  Reason for visit: neck pain     Estimated time of onset:  month(s)    Numeric Rating Scale: (No Pain) 0  to  10 (Worst Pain)        Pain at Present:  3/10                                                                                                                       Distribution of Pain:    left has a burning feeling in her left middle finger. Sometimes it will go up to her elbow, states her left foot locks up and has cramping in her leg. States she has SCS for her back was place in 2020.    States she has a cervical fusion in 2017  States she has a back fusion about 20 years ago and the second one in 2018.      Prior diagnostic testing related to this condition:  no recent imaging

## 2024-02-16 NOTE — PATIENT INSTRUCTIONS
Refill policies:    Allow 2-3 business days for refills; controlled substances may take longer.  Contact your pharmacy at least 5 days prior to running out of medication and have them send an electronic request or submit request through the “request refill” option in your [employer] account.  Refills are not addressed on weekends; covering physicians do not authorize routine medications on weekends.  No narcotics or controlled substances are refilled after noon on Fridays or by on call physicians.  By law, narcotics must be electronically prescribed.  A 30 day supply with no refills is the maximum allowed.  If your prescription is due for a refill, you may be due for a follow up appointment.  To best provide you care, patients receiving routine medications need to be seen at least once a year.  Patients receiving narcotic/controlled substance medications need to be seen at least once every 3 months.  In the event that your preferred pharmacy does not have the requested medication in stock (e.g. Backordered), it is your responsibility to find another pharmacy that has the requested medication available.  We will gladly send a new prescription to that pharmacy at your request.    Scheduling Tests:    If your physician has ordered radiology tests such as MRI or CT scans, please contact Central Scheduling at [phone] right away to schedule the test.  Once scheduled, the [facility] Centralized Referral Team will work with your insurance carrier to obtain pre-certification or prior authorization.  Depending on your insurance carrier, approval may take 3-10 days.  It is highly recommended patients assure they have received an authorization before having a test performed.  If test is done without insurance authorization, patient may be responsible for the entire amount billed.      Precertification and Prior Authorizations:  If your physician has recommended that you have a procedure or additional testing performed the [facility]  Centralized Referral Team will contact your insurance carrier to obtain pre-certification or prior authorization.    You are strongly encouraged to contact your insurance carrier to verify that your procedure/test has been approved and is a COVERED benefit.  Although the [facility] Centralized Referral Team does its due diligence, the insurance carrier gives the disclaimer that \"Although the procedure is authorized, this does not guarantee payment.\"    Ultimately the patient is responsible for payment.   Thank you for your understanding in this matter.  Paperwork Completion:  If you require FMLA or disability paperwork for your recovery, please make sure to either drop it off or have it faxed to our office at [phone]. Be sure the form has your name and date of birth on it.  The form will be faxed to our Forms Department and they will complete it for you.  There is a 25$ fee for all forms that need to be filled out.  Please be aware there is a 10-14 day turnaround time.  You will need to sign a release of information ([employer]) form if your paperwork does not come with one.  You may call the Forms Department with any questions at [phone].  Their fax number is [phone].
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
## 2024-02-20 NOTE — ED PROVIDER NOTES
Patient Seen in: [facility]      History   No chief complaint on file.     Stated Complaint: Breast pain, radiating to upper back    HPI    57-year-old female with a history of anxiety depression hypertension and high cholesterol who comes Stable • EXCIS LUMBAR DISK,ONE LEVEL     • INTRAOPERATIVE NEURO MONITORING N/A 7/11/2017    Performed by Vikram Hodges MD at [address] [facility]   • LUMBAR FACET INJECTION Bilateral 12/13/2018    Performed by Stanley Morillo MD at [address] radiating to upper back  Other systems are as noted in HPI. Constitutional and vital signs reviewed. All other systems reviewed and negative except as noted above.     Physical Exam     ED Triage Vitals [11/15/20 1405]   /83   Pulse 97   Resp 20 Judgment normal.             ED Course     Labs Reviewed   POCT ISTAT CHEM8 CARTRIDGE - Abnormal; Notable for the following components:       Result Value    ISTAT Potassium 3.4 (*)     All other components within normal limits   ISTAT TROPONIN - Normal 3        Risk of adverse cardiac event is 0.9-1.7%            The patient is in good condition throughout her visit today and remains so upon discharge.  I discuss the plan of care with the patient, who expresses understanding.  All questions and concerns

## 2024-02-25 ENCOUNTER — HOSPITAL ENCOUNTER (OUTPATIENT)
Dept: GENERAL RADIOLOGY | Age: 59
End: 2024-02-25
Attending: NURSE PRACTITIONER
Payer: COMMERCIAL

## 2024-02-25 ENCOUNTER — HOSPITAL ENCOUNTER (OUTPATIENT)
Dept: GENERAL RADIOLOGY | Age: 59
Discharge: HOME | End: 2024-02-25
Attending: NURSE PRACTITIONER
Payer: COMMERCIAL

## 2024-02-25 DIAGNOSIS — Z96.89 S/P INSERTION OF SPINAL CORD STIMULATOR: ICD-10-CM

## 2024-02-25 DIAGNOSIS — G56.03 BILATERAL CARPAL TUNNEL SYNDROME: ICD-10-CM

## 2024-02-25 DIAGNOSIS — M54.50 LUMBAR BACK PAIN: ICD-10-CM

## 2024-02-25 DIAGNOSIS — Z98.1 S/P CERVICAL SPINAL FUSION: ICD-10-CM

## 2024-02-25 DIAGNOSIS — R20.2 LEFT HAND PARESTHESIA: ICD-10-CM

## 2024-02-25 DIAGNOSIS — Z98.890 S/P CARPAL TUNNEL RELEASE: ICD-10-CM

## 2024-02-25 PROCEDURE — 72114 X-RAY EXAM L-S SPINE BENDING: CPT | Performed by: NURSE PRACTITIONER

## 2024-02-25 PROCEDURE — 72052 X-RAY EXAM NECK SPINE 6/>VWS: CPT | Performed by: NURSE PRACTITIONER

## 2024-03-30 ENCOUNTER — HOSPITAL ENCOUNTER (OUTPATIENT)
Dept: MRI IMAGING | Facility: HOSPITAL | Age: 59
Discharge: HOME | End: 2024-03-30
Attending: NURSE PRACTITIONER
Payer: COMMERCIAL

## 2024-03-30 DIAGNOSIS — Z98.1 S/P CERVICAL SPINAL FUSION: ICD-10-CM

## 2024-03-30 DIAGNOSIS — R20.2 LEFT HAND PARESTHESIA: ICD-10-CM

## 2024-03-30 DIAGNOSIS — Z98.890 S/P CARPAL TUNNEL RELEASE: ICD-10-CM

## 2024-03-30 DIAGNOSIS — G56.03 BILATERAL CARPAL TUNNEL SYNDROME: ICD-10-CM

## 2024-03-30 PROCEDURE — 72141 MRI NECK SPINE W/O DYE: CPT | Performed by: NURSE PRACTITIONER

## 2024-07-16 ENCOUNTER — OFFICE VISIT (OUTPATIENT)
Dept: PAIN CLINIC | Facility: CLINIC | Age: 59
End: 2024-07-16
Payer: COMMERCIAL

## 2024-07-16 ENCOUNTER — HOSPITAL ENCOUNTER (OUTPATIENT)
Dept: GENERAL RADIOLOGY | Facility: HOSPITAL | Age: 59
Discharge: HOME | End: 2024-07-16
Attending: PHYSICIAN ASSISTANT
Payer: COMMERCIAL

## 2024-07-16 VITALS — OXYGEN SATURATION: 96 % | DIASTOLIC BLOOD PRESSURE: 82 MMHG | HEART RATE: 80 BPM | SYSTOLIC BLOOD PRESSURE: 122 MMHG

## 2024-07-16 DIAGNOSIS — Z96.89 SPINAL CORD STIMULATOR STATUS: ICD-10-CM

## 2024-07-16 DIAGNOSIS — Z96.89 SPINAL CORD STIMULATOR STATUS: Primary | ICD-10-CM

## 2024-07-16 PROCEDURE — 3074F SYST BP LT 130 MM HG: CPT | Performed by: PHYSICIAN ASSISTANT

## 2024-07-16 PROCEDURE — 72072 X-RAY EXAM THORAC SPINE 3VWS: CPT | Performed by: PHYSICIAN ASSISTANT

## 2024-07-16 PROCEDURE — 99214 OFFICE O/P EST MOD 30 MIN: CPT | Performed by: PHYSICIAN ASSISTANT

## 2024-07-16 PROCEDURE — 3079F DIAST BP 80-89 MM HG: CPT | Performed by: PHYSICIAN ASSISTANT

## 2024-07-16 NOTE — PATIENT INSTRUCTIONS
Refill policies:    Allow 2-3 business days for refills; controlled substances may take longer.  Contact your pharmacy at least 5 days prior to running out of medication and have them send an electronic request or submit request through the “request refill” option in your [employer] account.  Refills are not addressed on weekends; covering physicians do not authorize routine medications on weekends.  No narcotics or controlled substances are refilled after noon on Fridays or by on call physicians.  By law, narcotics must be electronically prescribed.  A 30 day supply with no refills is the maximum allowed.  If your prescription is due for a refill, you may be due for a follow up appointment.  To best provide you care, patients receiving routine medications need to be seen at least once a year.  Patients receiving narcotic/controlled substance medications need to be seen at least once every 3 months.  In the event that your preferred pharmacy does not have the requested medication in stock (e.g. Backordered), it is your responsibility to find another pharmacy that has the requested medication available.  We will gladly send a new prescription to that pharmacy at your request.    Scheduling Tests:    If your physician has ordered radiology tests such as MRI or CT scans, please contact Central Scheduling at [phone] right away to schedule the test.  Once scheduled, the [facility] Centralized Referral Team will work with your insurance carrier to obtain pre-certification or prior authorization.  Depending on your insurance carrier, approval may take 3-10 days.  It is highly recommended patients assure they have received an authorization before having a test performed.  If test is done without insurance authorization, patient may be responsible for the entire amount billed.      Precertification and Prior Authorizations:  If your physician has recommended that you have a procedure or additional testing performed the [facility]  Centralized Referral Team will contact your insurance carrier to obtain pre-certification or prior authorization.    You are strongly encouraged to contact your insurance carrier to verify that your procedure/test has been approved and is a COVERED benefit.  Although the [facility] Centralized Referral Team does its due diligence, the insurance carrier gives the disclaimer that \"Although the procedure is authorized, this does not guarantee payment.\"    Ultimately the patient is responsible for payment.   Thank you for your understanding in this matter.  Paperwork Completion:  If you require FMLA or disability paperwork for your recovery, please make sure to either drop it off or have it faxed to our office at [phone]. Be sure the form has your name and date of birth on it.  The form will be faxed to our Forms Department and they will complete it for you.  There is a 25$ fee for all forms that need to be filled out.  Please be aware there is a 10-14 day turnaround time.  You will need to sign a release of information ([employer]OOSE[employer]ELT) form if your paperwork does not come with one.  You may call the Forms Department with any questions at [phone].  Their fax number is [phone].

## 2024-07-16 NOTE — PROGRESS NOTES
Patient presents in office today with reported pain in bilateral low back and glutes radiating down left leg    Current pain level reported = 5/10, worsens with walking    Last reported dose of n/a      Narcotic Contract renewal n/a    Urine Drug screen n/a

## 2024-07-16 NOTE — PROGRESS NOTES
HPI:   Marilin Merida presents with complaints of low back and B gluteal pain .    The pain is described as severe stabbing, grabbing that is intermittent.  The patient’s activity level has remained the same since last visit.  The pain is worst unrelated to time of day.    Changes in condition/history since last visit: patient is here today having not been seen since 8/19/2022.  At that time, had reported brief flares of pain in the area of the right SI joint, inferior and medial to her spinal cord stimulator battery.  Had been sent for x-ray, which revealed no structural changes, no issues with the hardware.  Had been sent to physical therapy, and states that this was helpful.    She has cotninued with B gluteal pain that, with time, have become more significant, and has had return of radiating lower extremity pain, initially improved with the stimulator.  States that she has not had any recent injuries, though has also not had any device analysis and programming in approximately 4 years (by her estimation).  Wishes to discuss options.    Last procedure: SCS implant    Date: 8/11/20    Percentage of relief experienced from the procedure: moderate    Duration of the relief: sustained     The following activities will increase the patient’s pain: transitioning from one position to another     The following activities decrease the patient’s pain: limiting activity level    Functional Assessment: Patient reports that they are able to complete all of their ADL's such as eating, bathing, using the toilet, dressing and getting up from a bed or a chair independently.    Current Medications:  Current Outpatient Medications   Medication Sig Dispense Refill    DULoxetine (CYMBALTA) 60 MG Oral Cap DR Particles Take 1 capsule (60 mg total) by mouth daily. To be taken with a 30 mg capsule. 90 capsule 1    DULoxetine 30 MG Oral Cap DR Particles TAKE ONE CAPSULE BY MOUTH DAILY. TO BE TAKEN WITH A 60MG CAPSULE. 90 capsule 1     omeprazole 20 MG Oral Capsule Delayed Release Take 1 capsule (20 mg total) by mouth every morning before breakfast.      famotidine 20 MG Oral Tab Take 1 tablet (20 mg total) by mouth 2 (two) times daily.      tiZANidine 4 MG Oral Tab Take 0.5 tablets (2 mg total) by mouth nightly.      Amitriptyline HCl 25 MG Oral Tab Take 1 tablet (25 mg total) by mouth nightly. 30 tablet 2    Triamterene-HCTZ 37.5-25 MG Oral Cap Take 1 capsule by mouth daily.  0    atorvastatin 10 MG Oral Tab Take 1 tablet (10 mg total) by mouth daily.      Fexofenadine HCl 180 MG Oral Tab Take 1 tablet (180 mg total) by mouth daily.        Patient requires assistance with: No assistance required    Reviewed Patient History Dated: 6/2/20 the following changes are noted: as above    Physical Exam:   /82 (BP Location: Left arm, Patient Position: Sitting, Cuff Size: large)   Pulse 80   LMP 06/11/2019 (Exact Date)   SpO2 96%   VAS Pain Score:  5/10  General Appearance: Well developed, well nourished, normal build, independent body habitus, no apparent physical disabilities, well groomed    Neurological Exam: WNL-Orientation to time, place and person, normal mood & effect, normal concentration & attention span  Inspection: non-antalgic, no acute distress  No focal sensory/motor deficits  No focal tenderness  Radiology/Lab Test Reviewed: no recent imaging   Lab Results   Component Value Date    WBC 10.0 07/30/2020    WBC 7.1 03/23/2019    WBC 11.2 05/05/2018     Lab Results   Component Value Date    HEMOGLOBIN 14.2 11/15/2020     Lab Results   Component Value Date    .0 07/30/2020     03/23/2019    .0 05/05/2018     Do you have any known blood/bleeding disorders?  No  Does patient currently take blood thinners?   None  Does patient currently take any antibiotics?   No  Patient educated and verbalized understanding.  Medical Decision Making:   Diagnosis:    Encounter Diagnosis   Name Primary?    Spinal cord stimulator  status Yes        Impression: Increasing lumbosacral pain with radiating left lower extremity pain, initially improved with spinal cord stimulator implant in August 2020 with Dr. Perales.  States that she has not had any recent device analysis or reprogramming, and informed her I would reach out to [employer] rep to have them contact her for analysis.  While she has not had any recent falls or injuries, will also check x-ray of the thoracic spine to assess for lead placement.    Plan: XR T-spine to assess for spinal cord stimulator lead placement.  Will have [employer] contact patient for device analysis and potential additional programming..      No orders of the defined types were placed in this encounter.      Meds & Refills for this Visit:  Requested Prescriptions      No prescriptions requested or ordered in this encounter       Imaging & Consults:  None    The patient indicates understanding of these issues and agrees to the plan.    CHRISS Carrasco

## 2024-08-01 ENCOUNTER — OFFICE VISIT (OUTPATIENT)
Dept: PAIN CLINIC | Facility: CLINIC | Age: 59
End: 2024-08-01
Payer: COMMERCIAL

## 2024-08-01 VITALS
WEIGHT: 226 LBS | OXYGEN SATURATION: 98 % | HEART RATE: 76 BPM | BODY MASS INDEX: 35 KG/M2 | SYSTOLIC BLOOD PRESSURE: 128 MMHG | DIASTOLIC BLOOD PRESSURE: 68 MMHG

## 2024-08-01 DIAGNOSIS — Z96.89 SPINAL CORD STIMULATOR STATUS: Primary | ICD-10-CM

## 2024-08-01 PROCEDURE — 3078F DIAST BP <80 MM HG: CPT | Performed by: PHYSICIAN ASSISTANT

## 2024-08-01 PROCEDURE — 3074F SYST BP LT 130 MM HG: CPT | Performed by: PHYSICIAN ASSISTANT

## 2024-08-01 PROCEDURE — 99214 OFFICE O/P EST MOD 30 MIN: CPT | Performed by: PHYSICIAN ASSISTANT

## 2024-08-01 NOTE — PROGRESS NOTES
Patient presents in office today with reported pain in back    Current pain level reported = 7/10    Last reported dose of nothing      Narcotic Contract renewal none    Urine Drug screen none

## 2024-08-01 NOTE — PROGRESS NOTES
HPI:   Marilin Merida presents with complaints of low back L gluteal pain to thigh.    The pain is described as severe stabbing, grabbing that is intermittent.  The patient’s activity level has remained the same since last visit.  The pain is worst unrelated to time of day.    Changes in condition/history since last visit: Here today for follow-up, having been seen on 7/16/2024.  At that time, had been sent for x-ray of the thoracic spine to check for spinal cord stimulator lead placement.  No significant shift in SCS leads were identified.  Did meet with [employer] [facility] the following day on 7/17/2024 (Rafael).  She was given 3 new programs, which have been exceptionally effective for R sided pain, though is not getting coverage on L side.  As such, her sleep was disrupted, and missed work 7/19/24.  She was written up for this, and is here to request [facility] paperwork.      Last procedure: SCS implant    Date: 8/11/20    Percentage of relief experienced from the procedure: moderate    Duration of the relief: sustained     The following activities will increase the patient’s pain: transitioning from one position to another     The following activities decrease the patient’s pain: limiting activity level    Functional Assessment: Patient reports that they are able to complete all of their ADL's such as eating, bathing, using the toilet, dressing and getting up from a bed or a chair independently.    Current Medications:  Current Outpatient Medications   Medication Sig Dispense Refill    DULoxetine (CYMBALTA) 60 MG Oral Cap DR Particles Take 1 capsule (60 mg total) by mouth daily. To be taken with a 30 mg capsule. 90 capsule 1    DULoxetine 30 MG Oral Cap DR Particles TAKE ONE CAPSULE BY MOUTH DAILY. TO BE TAKEN WITH A 60MG CAPSULE. 90 capsule 1    omeprazole 20 MG Oral Capsule Delayed Release Take 1 capsule (20 mg total) by mouth every morning before breakfast.      famotidine 20 MG Oral Tab Take 1 tablet (20 mg total) by  mouth 2 (two) times daily.      tiZANidine 4 MG Oral Tab Take 0.5 tablets (2 mg total) by mouth nightly.      Amitriptyline HCl 25 MG Oral Tab Take 1 tablet (25 mg total) by mouth nightly. 30 tablet 2    Triamterene-HCTZ 37.5-25 MG Oral Cap Take 1 capsule by mouth daily.  0    atorvastatin 10 MG Oral Tab Take 1 tablet (10 mg total) by mouth daily.      Fexofenadine HCl 180 MG Oral Tab Take 1 tablet (180 mg total) by mouth daily.        Patient requires assistance with: No assistance required    Reviewed Patient History Dated: 6/2/20 the following changes are noted: as above    Physical Exam:   /68   Pulse 76   Wt 226 lb (102.5 kg)   LMP 06/11/2019 (Exact Date)   SpO2 98%   BMI 35.40 kg/m²   VAS Pain Score:  5/10  General Appearance: Well developed, well nourished, normal build, independent body habitus, no apparent physical disabilities, well groomed    Neurological Exam: WNL-Orientation to time, place and person, normal mood & effect, normal concentration & attention span  Inspection: non-antalgic, no acute distress  No focal sensory/motor deficits  No focal tenderness  Radiology/Lab Test Reviewed: no recent imaging   Lab Results   Component Value Date    WBC 10.0 07/30/2020    WBC 7.1 03/23/2019    WBC 11.2 05/05/2018     Lab Results   Component Value Date    HEMOGLOBIN 14.2 11/15/2020     Lab Results   Component Value Date    .0 07/30/2020     03/23/2019    .0 05/05/2018     Do you have any known blood/bleeding disorders?  No  Does patient currently take blood thinners?   None  Does patient currently take any antibiotics?   No  Patient educated and verbalized understanding.  Medical Decision Making:   Diagnosis:    Encounter Diagnosis   Name Primary?    Spinal cord stimulator status Yes          Impression: was having increasing lumbosacral pain with radiating left lower extremity pain, initially improved with spinal cord stimulator implant in August 2020 with Dr. Perales.  She had  not had any recent device analysis or reprogramming, and did have her meet with [employer] rep 7/17/24 for anaylsis of device and additional programmming.  This been very effective for her right-sided complaints, though has done little for the left.  Here today to meet with [employer] rep for additional programming to try and capture her left-sided pain.    In addition, states that she missed work on 7/19/2024, citing difficulty sleeping after the programming.  As such, she was written up at work, and now wants us to fill out FMLA paperwork.  Will is not something I have done before for spinal cord stimulator programming, can certainly check with staff to see if this is something that is routinely done, and if so, would be happy to help.  If not, would defer to her surgeon or primary care.    Plan: Met with [employer] representative [place] for additional programming to try and capture left-sided pain.  Will discuss with staff about possible FMLA paperwork.    No orders of the defined types were placed in this encounter.      Meds & Refills for this Visit:  Requested Prescriptions      No prescriptions requested or ordered in this encounter       Imaging & Consults:  None    The patient indicates understanding of these issues and agrees to the plan.    CHRISS Carrasco

## 2024-08-01 NOTE — PATIENT INSTRUCTIONS
Refill policies:    Allow 2-3 business days for refills; controlled substances may take longer.  Contact your pharmacy at least 5 days prior to running out of medication and have them send an electronic request or submit request through the “request refill” option in your [facility] account.  Refills are not addressed on weekends; covering physicians do not authorize routine medications on weekends.  No narcotics or controlled substances are refilled after noon on Fridays or by on call physicians.  By law, narcotics must be electronically prescribed.  A 30 day supply with no refills is the maximum allowed.  If your prescription is due for a refill, you may be due for a follow up appointment.  To best provide you care, patients receiving routine medications need to be seen at least once a year.  Patients receiving narcotic/controlled substance medications need to be seen at least once every 3 months.  In the event that your preferred pharmacy does not have the requested medication in stock (e.g. Backordered), it is your responsibility to find another pharmacy that has the requested medication available.  We will gladly send a new prescription to that pharmacy at your request.    Scheduling Tests:    If your physician has ordered radiology tests such as MRI or CT scans, please contact Central Scheduling at [phone] right away to schedule the test.  Once scheduled, the [facility] Centralized Referral Team will work with your insurance carrier to obtain pre-certification or prior authorization.  Depending on your insurance carrier, approval may take 3-10 days.  It is highly recommended patients assure they have received an authorization before having a test performed.  If test is done without insurance authorization, patient may be responsible for the entire amount billed.      Precertification and Prior Authorizations:  If your physician has recommended that you have a procedure or additional testing performed the [facility]  Centralized Referral Team will contact your insurance carrier to obtain pre-certification or prior authorization.    You are strongly encouraged to contact your insurance carrier to verify that your procedure/test has been approved and is a COVERED benefit.  Although the [facility] Centralized Referral Team does its due diligence, the insurance carrier gives the disclaimer that \"Although the procedure is authorized, this does not guarantee payment.\"    Ultimately the patient is responsible for payment.   Thank you for your understanding in this matter.  Paperwork Completion:  If you require FMLA or disability paperwork for your recovery, please make sure to either drop it off or have it faxed to our office at [phone]. Be sure the form has your name and date of birth on it.  The form will be faxed to our Forms Department and they will complete it for you.  There is a 25$ fee for all forms that need to be filled out.  Please be aware there is a 10-14 day turnaround time.  You will need to sign a release of information ([employer]T) form if your paperwork does not come with one.  You may call the Forms Department with any questions at [phone].  Their fax number is [phone].

## 2024-08-05 ENCOUNTER — TELEPHONE (OUTPATIENT)
Dept: PAIN CLINIC | Facility: CLINIC | Age: 59
End: 2024-08-05

## 2024-08-05 NOTE — TELEPHONE ENCOUNTER
D/W  Rodrigue who states FMLA paperwork has not been completed through this office in the past. Rodrigue also states that we are not involved w/ stimulator adjustments, that is arranged btwn the pt and the [employer] rep. Furthermore, pt should speak to the [employer] rep about scheduling any further programming adjustments outside of work hours, if possible. Rodrigue states he can provide a letter for work for the [employer] on 8/1/24, however he cannot complete FMLA paperwork.     Contacted pt to relay info above. Pt VU. No further needs.

## 2024-09-10 ENCOUNTER — OFFICE VISIT (OUTPATIENT)
Dept: SURGERY | Facility: CLINIC | Age: 59
End: 2024-09-10
Payer: COMMERCIAL

## 2024-09-10 VITALS
SYSTOLIC BLOOD PRESSURE: 122 MMHG | BODY MASS INDEX: 35.57 KG/M2 | WEIGHT: 224 LBS | DIASTOLIC BLOOD PRESSURE: 86 MMHG | HEART RATE: 76 BPM | HEIGHT: 66.5 IN

## 2024-09-10 DIAGNOSIS — M54.50 LUMBAR PAIN: ICD-10-CM

## 2024-09-10 DIAGNOSIS — M79.605 LEFT LEG PAIN: Primary | ICD-10-CM

## 2024-09-10 DIAGNOSIS — M70.62 GREATER TROCHANTERIC BURSITIS OF LEFT HIP: ICD-10-CM

## 2024-09-10 DIAGNOSIS — R20.0 NUMBNESS OF RIGHT ANTERIOR THIGH: ICD-10-CM

## 2024-09-10 DIAGNOSIS — Z96.89 S/P INSERTION OF SPINAL CORD STIMULATOR: ICD-10-CM

## 2024-09-10 DIAGNOSIS — M76.32 IT BAND SYNDROME, LEFT: ICD-10-CM

## 2024-09-10 DIAGNOSIS — M54.16 LEFT LUMBAR RADICULOPATHY: ICD-10-CM

## 2024-09-10 DIAGNOSIS — M53.3 PAIN OF LEFT SACROILIAC JOINT: ICD-10-CM

## 2024-09-10 DIAGNOSIS — Z98.1 HISTORY OF LUMBAR FUSION: ICD-10-CM

## 2024-09-10 PROCEDURE — 3008F BODY MASS INDEX DOCD: CPT | Performed by: PHYSICIAN ASSISTANT

## 2024-09-10 PROCEDURE — 3079F DIAST BP 80-89 MM HG: CPT | Performed by: PHYSICIAN ASSISTANT

## 2024-09-10 PROCEDURE — 99213 OFFICE O/P EST LOW 20 MIN: CPT | Performed by: PHYSICIAN ASSISTANT

## 2024-09-10 PROCEDURE — 3074F SYST BP LT 130 MM HG: CPT | Performed by: PHYSICIAN ASSISTANT

## 2024-09-10 NOTE — PATIENT INSTRUCTIONS
Refill policies:    Allow 2-3 business days for refills; controlled substances may take longer.  Contact your pharmacy at least 5 days prior to running out of medication and have them send an electronic request or submit request through the “request refill” option in your [employer] account.  Refills are not addressed on weekends; covering physicians do not authorize routine medications on weekends.  No narcotics or controlled substances are refilled after noon on Fridays or by on call physicians.  By law, narcotics must be electronically prescribed.  A 30 day supply with no refills is the maximum allowed.  If your prescription is due for a refill, you may be due for a follow up appointment.  To best provide you care, patients receiving routine medications need to be seen at least once a year.  Patients receiving narcotic/controlled substance medications need to be seen at least once every 3 months.  In the event that your preferred pharmacy does not have the requested medication in stock (e.g. Backordered), it is your responsibility to find another pharmacy that has the requested medication available.  We will gladly send a new prescription to that pharmacy at your request.    Scheduling Tests:    If your physician has ordered radiology tests such as MRI or CT scans, please contact Central Scheduling at [phone] right away to schedule the test.  Once scheduled, the [facility] Centralized Referral Team will work with your insurance carrier to obtain pre-certification or prior authorization.  Depending on your insurance carrier, approval may take 3-10 days.  It is highly recommended patients assure they have received an authorization before having a test performed.  If test is done without insurance authorization, patient may be responsible for the entire amount billed.      Precertification and Prior Authorizations:  If your physician has recommended that you have a procedure or additional testing performed the [facility]  Centralized Referral Team will contact your insurance carrier to obtain pre-certification or prior authorization.    You are strongly encouraged to contact your insurance carrier to verify that your procedure/test has been approved and is a COVERED benefit.  Although the [facility] Centralized Referral Team does its due diligence, the insurance carrier gives the disclaimer that \"Although the procedure is authorized, this does not guarantee payment.\"    Ultimately the patient is responsible for payment.   Thank you for your understanding in this matter.  Paperwork Completion:  If you require FMLA or disability paperwork for your recovery, please make sure to either drop it off or have it faxed to our office at [phone]. Be sure the form has your name and date of birth on it.  The form will be faxed to our Forms Department and they will complete it for you.  There is a 25$ fee for all forms that need to be filled out.  Please be aware there is a 10-14 day turnaround time.  You will need to sign a release of information (ROOSE[employer]ELT) form if your paperwork does not come with one.  You may call the Forms Department with any questions at [phone].  Their fax number is [phone].
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
## 2024-09-10 NOTE — PROGRESS NOTES
New patient with hx of L4-S1 lumbar fusion by Dr. Perales and Medtronic spinal cord stimulator placed in 2020    Imaging:  - 2/25/2024 XR Lumbar Spine     Pain Scale:  6/10  low left back pain with glueteal pain and radiation down to foot, sometimes N/T    Treatments: Tylenol and Tizanidine with some relief

## 2024-09-13 ENCOUNTER — HOSPITAL ENCOUNTER (OUTPATIENT)
Dept: GENERAL RADIOLOGY | Age: 59
Discharge: HOME | End: 2024-09-13
Attending: PHYSICIAN ASSISTANT
Payer: COMMERCIAL

## 2024-09-13 DIAGNOSIS — M54.16 LEFT LUMBAR RADICULOPATHY: ICD-10-CM

## 2024-09-13 DIAGNOSIS — M53.3 PAIN OF LEFT SACROILIAC JOINT: ICD-10-CM

## 2024-09-13 DIAGNOSIS — M70.62 GREATER TROCHANTERIC BURSITIS OF LEFT HIP: ICD-10-CM

## 2024-09-13 DIAGNOSIS — M76.32 IT BAND SYNDROME, LEFT: ICD-10-CM

## 2024-09-13 DIAGNOSIS — M54.50 LUMBAR PAIN: ICD-10-CM

## 2024-09-13 DIAGNOSIS — R20.0 NUMBNESS OF RIGHT ANTERIOR THIGH: ICD-10-CM

## 2024-09-13 DIAGNOSIS — Z98.1 HISTORY OF LUMBAR FUSION: ICD-10-CM

## 2024-09-13 DIAGNOSIS — M79.605 LEFT LEG PAIN: ICD-10-CM

## 2024-09-13 DIAGNOSIS — Z96.89 S/P INSERTION OF SPINAL CORD STIMULATOR: ICD-10-CM

## 2024-09-13 PROCEDURE — 72070 X-RAY EXAM THORAC SPINE 2VWS: CPT | Performed by: PHYSICIAN ASSISTANT

## 2024-09-17 ENCOUNTER — PATIENT MESSAGE (OUTPATIENT)
Dept: SURGERY | Facility: CLINIC | Age: 59
End: 2024-09-17

## 2024-09-17 NOTE — TELEPHONE ENCOUNTER
Message below noted.    MRI scheduled for 10.10. X-ray DOS 9.13.    Pt stated that during her visit with Rafael when he tried to reprogram her stimulator on the left side it was hitting on the right side.     LOV 9.10.24  \"PLAN:   1. Medication: Non prescribed  2. Imaging:                 - Reviewed today:                              - MRI cervical, 3/2024:                                            - Cervical DDD with post op fusion changes                 - Ordered today:                              - MRI lumbar w + wo:                                            - Further assess chronic low back and LLE complaints with progressing RLE numbness.                               - XR thoracic spine:                                            - Monitor SCS leads  3. Activity:                 - PT ordered  4. Follow up after MRI for review or call or follow up sooner or go to the ED for any new, worsening or concerning signs or symptoms\"    Routed to Provider.

## 2024-09-17 NOTE — TELEPHONE ENCOUNTER
From: Marilin Merida  To: Michelle Moses  Sent: 9/17/2024 2:24 PM CDT  Subject: Marilinbill sonaime     Arturo.    I had the xray done, and will have the mri 10/10.    I forgot to mention one thing during my visit, with Rafael from [facility]. When he tried reprogramming my stimulator, whenever he tried doing the left side, it was hitting on the right. We have to see why the left side isn't being covered by the stimulator.

## 2024-09-18 NOTE — TELEPHONE ENCOUNTER
Promon message sent.     XR thoracic reviewed with Dr. Martin, compared to July, appears stable. No concern with the leads

## 2024-09-20 ENCOUNTER — TELEPHONE (OUTPATIENT)
Dept: ADMINISTRATIVE | Age: 59
End: 2024-09-20

## 2024-09-20 NOTE — TELEPHONE ENCOUNTER
Patient has appointment for Lumbar spine MRI on 10/10/24. MRI has been denied. Reason for denial was not provided. [facility] does not require an appointment for peer to peer, you just need to call to perform peer to peer. Please call number below and provide reference number.     To discuss this case with a  reviewer, contact [facility] at [phone]  . Use Reference Case Number: [id number]

## 2024-09-20 NOTE — TELEPHONE ENCOUNTER
MRI SPINE LUMBAR (W+WO) (CPT=72158)        Referral #: [id number]      Scheduled For: 10/10/2024    Status: P2P Request >     To discuss this case with a  reviewer, contact [facility] at [phone]  . Use Reference Case Number: [id number]                 Notified clinical staff for follow-up

## 2024-09-23 NOTE — TELEPHONE ENCOUNTER
[facility] team, please confirm that this MRI is now approved. We originally got a denial last week? Thanks.

## 2024-10-03 NOTE — IMAGING NOTE
Call placed to patient reminding them to bring the remote control fully charged with them to the MRI appointment. Left VM and CB number for questions.

## 2024-10-10 ENCOUNTER — HOSPITAL ENCOUNTER (OUTPATIENT)
Dept: MRI IMAGING | Facility: HOSPITAL | Age: 59
Discharge: HOME | End: 2024-10-10
Attending: PHYSICIAN ASSISTANT
Payer: COMMERCIAL

## 2024-10-10 ENCOUNTER — MED REC SCAN ONLY (OUTPATIENT)
Facility: CLINIC | Age: 59
End: 2024-10-10

## 2024-10-10 DIAGNOSIS — R20.0 NUMBNESS OF RIGHT ANTERIOR THIGH: ICD-10-CM

## 2024-10-10 DIAGNOSIS — M53.3 PAIN OF LEFT SACROILIAC JOINT: ICD-10-CM

## 2024-10-10 DIAGNOSIS — M54.16 LEFT LUMBAR RADICULOPATHY: ICD-10-CM

## 2024-10-10 DIAGNOSIS — M76.32 IT BAND SYNDROME, LEFT: ICD-10-CM

## 2024-10-10 DIAGNOSIS — Z96.89 S/P INSERTION OF SPINAL CORD STIMULATOR: ICD-10-CM

## 2024-10-10 DIAGNOSIS — M79.605 LEFT LEG PAIN: ICD-10-CM

## 2024-10-10 DIAGNOSIS — M70.62 GREATER TROCHANTERIC BURSITIS OF LEFT HIP: ICD-10-CM

## 2024-10-10 DIAGNOSIS — M54.50 LUMBAR PAIN: ICD-10-CM

## 2024-10-10 DIAGNOSIS — Z98.1 HISTORY OF LUMBAR FUSION: ICD-10-CM

## 2024-10-10 PROCEDURE — 72158 MRI LUMBAR SPINE W/O & W/DYE: CPT | Performed by: PHYSICIAN ASSISTANT

## 2024-10-10 PROCEDURE — A9575 INJ GADOTERATE MEGLUMI 0.1ML: HCPCS | Performed by: PHYSICIAN ASSISTANT

## 2024-10-10 RX ORDER — GADOTERATE MEGLUMINE 376.9 MG/ML
15 INJECTION INTRAVENOUS
Status: COMPLETED | OUTPATIENT
Start: 2024-10-10 | End: 2024-10-10

## 2024-10-10 RX ADMIN — GADOTERATE MEGLUMINE 15 ML: 376.9 INJECTION INTRAVENOUS at 18:41:00

## 2024-10-17 ENCOUNTER — OFFICE VISIT (OUTPATIENT)
Dept: SURGERY | Facility: CLINIC | Age: 59
End: 2024-10-17
Payer: COMMERCIAL

## 2024-10-17 VITALS — SYSTOLIC BLOOD PRESSURE: 124 MMHG | DIASTOLIC BLOOD PRESSURE: 76 MMHG | OXYGEN SATURATION: 96 % | HEART RATE: 73 BPM

## 2024-10-17 DIAGNOSIS — M48.061 SPINAL STENOSIS OF LUMBAR REGION WITHOUT NEUROGENIC CLAUDICATION: ICD-10-CM

## 2024-10-17 DIAGNOSIS — M71.30 SYNOVIAL CYST: ICD-10-CM

## 2024-10-17 DIAGNOSIS — M54.16 LEFT LUMBAR RADICULOPATHY: Primary | ICD-10-CM

## 2024-10-17 DIAGNOSIS — Z98.1 HISTORY OF LUMBAR FUSION: ICD-10-CM

## 2024-10-17 DIAGNOSIS — G56.22 ULNAR NEUROPATHY OF LEFT UPPER EXTREMITY: ICD-10-CM

## 2024-10-17 DIAGNOSIS — M51.362 DEGENERATION OF INTERVERTEBRAL DISC OF LUMBAR REGION WITH DISCOGENIC BACK PAIN AND LOWER EXTREMITY PAIN: ICD-10-CM

## 2024-10-17 DIAGNOSIS — R20.0 LEFT ARM NUMBNESS: ICD-10-CM

## 2024-10-17 DIAGNOSIS — M79.605 LEFT LEG PAIN: ICD-10-CM

## 2024-10-17 DIAGNOSIS — Z96.89 S/P INSERTION OF SPINAL CORD STIMULATOR: ICD-10-CM

## 2024-10-17 NOTE — PATIENT INSTRUCTIONS
Refill policies:    Allow 2-3 business days for refills; controlled substances may take longer.  Contact your pharmacy at least 5 days prior to running out of medication and have them send an electronic request or submit request through the “request refill” option in your [employer] account.  Refills are not addressed on weekends; covering physicians do not authorize routine medications on weekends.  No narcotics or controlled substances are refilled after noon on Fridays or by on call physicians.  By law, narcotics must be electronically prescribed.  A 30 day supply with no refills is the maximum allowed.  If your prescription is due for a refill, you may be due for a follow up appointment.  To best provide you care, patients receiving routine medications need to be seen at least once a year.  Patients receiving narcotic/controlled substance medications need to be seen at least once every 3 months.  In the event that your preferred pharmacy does not have the requested medication in stock (e.g. Backordered), it is your responsibility to find another pharmacy that has the requested medication available.  We will gladly send a new prescription to that pharmacy at your request.    Scheduling Tests:    If your physician has ordered radiology tests such as MRI or CT scans, please contact Central Scheduling at [phone] right away to schedule the test.  Once scheduled, the [facility] Centralized Referral Team will work with your insurance carrier to obtain pre-certification or prior authorization.  Depending on your insurance carrier, approval may take 3-10 days.  It is highly recommended patients assure they have received an authorization before having a test performed.  If test is done without insurance authorization, patient may be responsible for the entire amount billed.      Precertification and Prior Authorizations:  If your physician has recommended that you have a procedure or additional testing performed the [facility]  Centralized Referral Team will contact your insurance carrier to obtain pre-certification or prior authorization.    You are strongly encouraged to contact your insurance carrier to verify that your procedure/test has been approved and is a COVERED benefit.  Although the [facility] Centralized Referral Team does its due diligence, the insurance carrier gives the disclaimer that \"Although the procedure is authorized, this does not guarantee payment.\"    Ultimately the patient is responsible for payment.   Thank you for your understanding in this matter.  Paperwork Completion:  If you require FMLA or disability paperwork for your recovery, please make sure to either drop it off or have it faxed to our office at [phone]. Be sure the form has your name and date of birth on it.  The form will be faxed to our Forms Department and they will complete it for you.  There is a 25$ fee for all forms that need to be filled out.  Please be aware there is a 10-14 day turnaround time.  You will need to sign a release of information ([employer]) form if your paperwork does not come with one.  You may call the Forms Department with any questions at [phone].  Their fax number is [phone].

## 2024-10-17 NOTE — PROGRESS NOTES
Patient is here today for follow up on chronic low back and LLE complaints with progressing RLE numbness-- review of recent imaging    IMAGING:  -10/10/24-- MRI Spine Lumbar  -9/13/24-- XR Thoracic Spine    Pain Scale:  7/10 some relief with lying on stomach    Treatments:  - Physical Therapy- no  - Tizanidine with no relief
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
## 2024-10-30 ENCOUNTER — TELEPHONE (OUTPATIENT)
Dept: PHYSICAL MEDICINE AND REHAB | Facility: CLINIC | Age: 59
End: 2024-10-30

## 2024-10-30 ENCOUNTER — OFFICE VISIT (OUTPATIENT)
Dept: PHYSICAL MEDICINE AND REHAB | Facility: CLINIC | Age: 59
End: 2024-10-30
Payer: COMMERCIAL

## 2024-10-30 DIAGNOSIS — M25.559 GREATER TROCHANTERIC PAIN SYNDROME: Primary | ICD-10-CM

## 2024-10-30 DIAGNOSIS — M71.38 CYST OF LUMBAR FACET JOINT: ICD-10-CM
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
## 2024-10-30 NOTE — PROCEDURES
PROCEDURE NOTE    DIAGNOSIS  Left greater trochanteric bursitis/pain syndrome    PROCEDURE  Ultrasound guided Left greater trochanteric bursa injection    PERFORMING PHYSICIAN  Herve Morel DO    PROCEDURE NOTE  Informed consent was obtained. Risks and benefits of the procedure were explained. The patient was placed in a sidelying position and the Left greater trochanter was identified under ultrasound using the curvilinear transducer. The area of maximal tenderness was identified. The area was prepped with Betadine x 3 and alcohol swab. Sterile ultrasound gel was applied. A 27-gauge 1.5 inch needle was inserted into this area and 1-2 mL of 1% lidocaine was infused subcutaneously to anesthetize the region. Then, a 22-gauge 3.5 inch needle was advanced under ultrasound guidance to the target, using an in-plane approach. Then, 1 mL of 40 mg/mL Triamcinolone, 3 mL of 1% Lidocaine was infused. The patient tolerated the procedure without complications. Post procedure instructions were provided.        Herve Morel DO  Physical Medicine and Rehabilitation / Sports Medicine   [facility]

## 2024-10-30 NOTE — TELEPHONE ENCOUNTER
Initiated authorization for Left L3/4 facet joint injection with facet cyst aspiration and rupture under fluoroscopic guidance. CPT/HCPCS 95934, 41186 dx:M71.38 to be done at [facility] with Rosita BRICEÑO with [employer] for CPT code 73840 as [facility] cannot process.    Clinicals for CPT code 92965 has been faxed to [employer]  Status: pending  Reference # [id number]      Clinicals faxed/uploaded to [facility] for PCT code 12726   Status: Pending  Case # [id number]  Anticipated Determination Date:11/01/2024            AND  Initiated authorization for Left greater trochanteric bursa injection, ultrasound guidance. CPT/HCPCS 17882,  dx:M25.559 with [employer]  Completed in the office today    Status: Approved - no action required  Reference/Authorization # [id number]  Authorization is not required based on medical necessity when being performed, however is not a guarantee of payment and may be subject to review once claim is submitted-Covered Benefit

## 2024-11-04 NOTE — TELEPHONE ENCOUNTER
Approval has been received from [facility] for the CPT code: 38218.    CPT code for 52578 via [employer] is still pending. Ref# [id number]

## 2024-11-07 ENCOUNTER — TELEPHONE (OUTPATIENT)
Facility: LOCATION | Age: 59
End: 2024-11-07

## 2024-11-07 ENCOUNTER — OFFICE VISIT (OUTPATIENT)
Facility: LOCATION | Age: 59
End: 2024-11-07
Payer: COMMERCIAL

## 2024-11-07 VITALS
DIASTOLIC BLOOD PRESSURE: 79 MMHG | SYSTOLIC BLOOD PRESSURE: 122 MMHG | OXYGEN SATURATION: 98 % | TEMPERATURE: 97 F | HEART RATE: 77 BPM

## 2024-11-07 DIAGNOSIS — N60.01 BREAST CYST, RIGHT: Primary | ICD-10-CM

## 2024-11-07 PROCEDURE — 99205 OFFICE O/P NEW HI 60 MIN: CPT | Performed by: STUDENT IN AN ORGANIZED HEALTH CARE EDUCATION/TRAINING PROGRAM

## 2024-11-07 PROCEDURE — 3078F DIAST BP <80 MM HG: CPT | Performed by: STUDENT IN AN ORGANIZED HEALTH CARE EDUCATION/TRAINING PROGRAM

## 2024-11-07 PROCEDURE — 3074F SYST BP LT 130 MM HG: CPT | Performed by: STUDENT IN AN ORGANIZED HEALTH CARE EDUCATION/TRAINING PROGRAM
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
## 2024-11-07 NOTE — TELEPHONE ENCOUNTER
DANA LEWIS Patient  Member ID  [id number]    Date of Birth  1965-06-13    Gender  Female    Transaction Type  Outpatient Authorization    Organization  [facility]    Payer  [facility] logo     Certificate Information  Reference Number  [id number]    Status  NO ACTION REQUIRED    Message  Requested Service does not require preauthorization. We would strongly encourage you to check benefits for this service.    Member Information  Patient Name  DANA LEWIS    Patient Date of Birth  1965-06-13    Patient Gender  Female    Member ID  [id number]    Relationship to Subscriber  Self    Subscriber Name  DANA LEWIS    Requesting Provider     Name  MALINALAELIZABETH ANABELAMOISE    NPI  [id number]    Tax Id  [id number]    Specialty  [id number]    Provider Role  Provider    Address  [address] [place] [address]    Phone  [phone]    Fax  [phone]    Contact Name  ELIE IVERSON    Service Information  Service Type  2 - Surgical    Place of Service  22 - On [facility]    Service From - To Date  2024-11-11 - 2024-12-31    Level of Service  Elective    Diagnosis Code 1   - Solitary cyst of right breast    Procedure Code 1 (CPT/HCPCS)  13371 - REMOVAL OF BREAST LESION    Quantity  1 Units    Status  NO ACTION REQUIRED

## 2024-11-08 ENCOUNTER — TELEPHONE (OUTPATIENT)
Facility: LOCATION | Age: 59
End: 2024-11-08

## 2024-11-08 ENCOUNTER — LABORATORY ENCOUNTER (OUTPATIENT)
Dept: LAB | Age: 59
End: 2024-11-08
Attending: STUDENT IN AN ORGANIZED HEALTH CARE EDUCATION/TRAINING PROGRAM
Payer: COMMERCIAL

## 2024-11-08 DIAGNOSIS — I10 HYPERTENSION: ICD-10-CM

## 2024-11-08 DIAGNOSIS — N60.01 BREAST CYST, RIGHT: ICD-10-CM

## 2024-11-08 DIAGNOSIS — Z01.818 PRE-OP TESTING: ICD-10-CM

## 2024-11-08 LAB
ANION GAP SERPL CALC-SCNC: 3 MMOL/L (ref 0–18)
ATRIAL RATE: 73 BPM
BUN BLD-MCNC: 15 MG/DL (ref 9–23)
CALCIUM BLD-MCNC: 9.4 MG/DL (ref 8.7–10.4)
CHLORIDE SERPL-SCNC: 107 MMOL/L (ref 98–112)
CO2 SERPL-SCNC: 31 MMOL/L (ref 21–32)
CREAT BLD-MCNC: 1.01 MG/DL
EGFRCR SERPLBLD CKD-EPI 2021: 65 ML/MIN/1.73M2 (ref 60–?)
FASTING STATUS PATIENT QL REPORTED: YES
GLUCOSE BLD-MCNC: 81 MG/DL (ref 70–99)
OSMOLALITY SERPL CALC.SUM OF ELEC: 292 MOSM/KG (ref 275–295)
P AXIS: 87 DEGREES
P-R INTERVAL: 108 MS
POTASSIUM SERPL-SCNC: 4.3 MMOL/L (ref 3.5–5.1)
Q-T INTERVAL: 402 MS
QRS DURATION: 60 MS
QTC CALCULATION (BEZET): 442 MS
R AXIS: 57 DEGREES
SODIUM SERPL-SCNC: 141 MMOL/L (ref 136–145)
T AXIS: 79 DEGREES
VENTRICULAR RATE: 73 BPM

## 2024-11-08 PROCEDURE — 80048 BASIC METABOLIC PNL TOTAL CA: CPT

## 2024-11-08 PROCEDURE — 93010 ELECTROCARDIOGRAM REPORT: CPT | Performed by: INTERNAL MEDICINE

## 2024-11-08 PROCEDURE — 36415 COLL VENOUS BLD VENIPUNCTURE: CPT

## 2024-11-08 PROCEDURE — 93005 ELECTROCARDIOGRAM TRACING: CPT

## 2024-11-08 NOTE — TELEPHONE ENCOUNTER
The patient have a upcoming surgery and calling to let us know that she broke out in hives on her right leg. The patient was wondering should the be concerned.    Call back # [phone]

## 2024-11-08 NOTE — TELEPHONE ENCOUNTER
Returned patient call but no answer. LVM for patient to call office back with more details about the hives.  Patient also provided with number to preadmission testing to see if they have any objections to upcoming procedure.

## 2024-11-09 ENCOUNTER — HOSPITAL ENCOUNTER (OUTPATIENT)
Age: 59
Discharge: HOME | End: 2024-11-09
Payer: COMMERCIAL

## 2024-11-09 VITALS
SYSTOLIC BLOOD PRESSURE: 140 MMHG | OXYGEN SATURATION: 98 % | TEMPERATURE: 97 F | HEART RATE: 68 BPM | DIASTOLIC BLOOD PRESSURE: 90 MMHG | WEIGHT: 224 LBS | RESPIRATION RATE: 20 BRPM | BODY MASS INDEX: 36 KG/M2 | HEIGHT: 66 IN

## 2024-11-09 DIAGNOSIS — L50.9 HIVES: Primary | ICD-10-CM

## 2024-11-09 PROCEDURE — 99213 OFFICE O/P EST LOW 20 MIN: CPT

## 2024-11-09 PROCEDURE — 99214 OFFICE O/P EST MOD 30 MIN: CPT

## 2024-11-09 RX ORDER — DEXAMETHASONE 4 MG/1
4 TABLET ORAL ONCE
Status: COMPLETED | OUTPATIENT
Start: 2024-11-09 | End: 2024-11-09

## 2024-11-09 RX ORDER — PREDNISONE 20 MG/1
40 TABLET ORAL DAILY
Qty: 6 TABLET | Refills: 0 | Status: SHIPPED | OUTPATIENT
Start: 2024-11-09 | End: 2024-11-12

## 2024-11-09 RX ORDER — FAMOTIDINE 20 MG/1
20 TABLET, FILM COATED ORAL ONCE
Status: COMPLETED | OUTPATIENT
Start: 2024-11-09 | End: 2024-11-09

## 2024-11-09 RX ORDER — DIPHENHYDRAMINE HCL 25 MG
25 CAPSULE ORAL ONCE
Status: COMPLETED | OUTPATIENT
Start: 2024-11-09 | End: 2024-11-09
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
## 2024-11-09 NOTE — ED INITIAL ASSESSMENT (HPI)
C/o hives slightly itchy started Wednesday to right lower leg, spreading. Yesterday to left elbow and right arm. Head feels itchy. Started new supplements Magnesium. Throat feels dry. No shortness of brreath

## 2024-11-09 NOTE — DISCHARGE INSTRUCTIONS
Please return to the ER/clinic if symptoms worsen. Follow-up with your PCP in 24-48 hours as needed.    Stop the magnesium supplement.  Recommend tepid showers.  The The decadron will work in your system the next several days.  Will give some additional prednisone on day 2 or 3 if symptoms persist that she would mention that spreading. Recommend zyrtec daily. Also log of any new lotions detergents or products etc.  If anything changes i.e. lip swelling throat itching wheezing etc. dial 911 or go directly to the emergency room.  Otherwise close follow-up with your PCP for further evaluation and treatment.

## 2024-11-10 ENCOUNTER — PATIENT MESSAGE (OUTPATIENT)
Facility: LOCATION | Age: 59
End: 2024-11-10

## 2024-11-11 ENCOUNTER — PATIENT MESSAGE (OUTPATIENT)
Dept: PHYSICAL MEDICINE AND REHAB | Facility: CLINIC | Age: 59
End: 2024-11-11

## 2024-11-11 ENCOUNTER — HOSPITAL ENCOUNTER (OUTPATIENT)
Facility: HOSPITAL | Age: 59
Setting detail: HOSPITAL OUTPATIENT SURGERY
Discharge: HOME | End: 2024-11-11
Attending: STUDENT IN AN ORGANIZED HEALTH CARE EDUCATION/TRAINING PROGRAM | Admitting: STUDENT IN AN ORGANIZED HEALTH CARE EDUCATION/TRAINING PROGRAM
Payer: COMMERCIAL

## 2024-11-11 ENCOUNTER — ANESTHESIA EVENT (OUTPATIENT)
Dept: SURGERY | Facility: HOSPITAL | Age: 59
End: 2024-11-11
Payer: COMMERCIAL

## 2024-11-11 ENCOUNTER — ANESTHESIA (OUTPATIENT)
Dept: SURGERY | Facility: HOSPITAL | Age: 59
End: 2024-11-11
Payer: COMMERCIAL

## 2024-11-11 VITALS
DIASTOLIC BLOOD PRESSURE: 78 MMHG | OXYGEN SATURATION: 98 % | SYSTOLIC BLOOD PRESSURE: 113 MMHG | WEIGHT: 228 LBS | BODY MASS INDEX: 36.64 KG/M2 | RESPIRATION RATE: 16 BRPM | TEMPERATURE: 97 F | HEIGHT: 66 IN | HEART RATE: 55 BPM

## 2024-11-11 DIAGNOSIS — N60.01 BREAST CYST, RIGHT: Primary | ICD-10-CM

## 2024-11-11 DIAGNOSIS — Z01.818 PRE-OP TESTING: ICD-10-CM

## 2024-11-11 DIAGNOSIS — L72.0 EPIDERMOID CYST: ICD-10-CM

## 2024-11-11 DIAGNOSIS — I10 HYPERTENSION: ICD-10-CM

## 2024-11-11 PROCEDURE — 88304 TISSUE EXAM BY PATHOLOGIST: CPT | Performed by: STUDENT IN AN ORGANIZED HEALTH CARE EDUCATION/TRAINING PROGRAM

## 2024-11-11 PROCEDURE — 0HB5XZZ EXCISION OF CHEST SKIN, EXTERNAL APPROACH: ICD-10-PCS | Performed by: STUDENT IN AN ORGANIZED HEALTH CARE EDUCATION/TRAINING PROGRAM

## 2024-11-11 RX ORDER — HYDROMORPHONE HYDROCHLORIDE 1 MG/ML
0.4 INJECTION, SOLUTION INTRAMUSCULAR; INTRAVENOUS; SUBCUTANEOUS EVERY 5 MIN PRN
Status: DISCONTINUED | OUTPATIENT
Start: 2024-11-11 | End: 2024-11-11

## 2024-11-11 RX ORDER — NALOXONE HYDROCHLORIDE 0.4 MG/ML
0.08 INJECTION, SOLUTION INTRAMUSCULAR; INTRAVENOUS; SUBCUTANEOUS AS NEEDED
Status: DISCONTINUED | OUTPATIENT
Start: 2024-11-11 | End: 2024-11-11

## 2024-11-11 RX ORDER — ACETAMINOPHEN 500 MG
1000 TABLET ORAL ONCE AS NEEDED
Status: DISCONTINUED | OUTPATIENT
Start: 2024-11-11 | End: 2024-11-11

## 2024-11-11 RX ORDER — PROCHLORPERAZINE EDISYLATE 5 MG/ML
5 INJECTION INTRAMUSCULAR; INTRAVENOUS EVERY 8 HOURS PRN
Status: DISCONTINUED | OUTPATIENT
Start: 2024-11-11 | End: 2024-11-11

## 2024-11-11 RX ORDER — HYDROMORPHONE HYDROCHLORIDE 1 MG/ML
0.2 INJECTION, SOLUTION INTRAMUSCULAR; INTRAVENOUS; SUBCUTANEOUS EVERY 5 MIN PRN
Status: DISCONTINUED | OUTPATIENT
Start: 2024-11-11 | End: 2024-11-11

## 2024-11-11 RX ORDER — ACETAMINOPHEN 500 MG
1000 TABLET ORAL ONCE
Status: DISCONTINUED | OUTPATIENT
Start: 2024-11-11 | End: 2024-11-11 | Stop reason: HOSPADM

## 2024-11-11 RX ORDER — MIDAZOLAM HYDROCHLORIDE 1 MG/ML
1 INJECTION INTRAMUSCULAR; INTRAVENOUS EVERY 5 MIN PRN
Status: DISCONTINUED | OUTPATIENT
Start: 2024-11-11 | End: 2024-11-11

## 2024-11-11 RX ORDER — HYDROMORPHONE HYDROCHLORIDE 1 MG/ML
0.6 INJECTION, SOLUTION INTRAMUSCULAR; INTRAVENOUS; SUBCUTANEOUS EVERY 5 MIN PRN
Status: DISCONTINUED | OUTPATIENT
Start: 2024-11-11 | End: 2024-11-11

## 2024-11-11 RX ORDER — SODIUM CHLORIDE, SODIUM LACTATE, POTASSIUM CHLORIDE, CALCIUM CHLORIDE 600; 310; 30; 20 MG/100ML; MG/100ML; MG/100ML; MG/100ML
INJECTION, SOLUTION INTRAVENOUS CONTINUOUS
Status: DISCONTINUED | OUTPATIENT
Start: 2024-11-11 | End: 2024-11-11

## 2024-11-11 RX ORDER — HYDROCODONE BITARTRATE AND ACETAMINOPHEN 5; 325 MG/1; MG/1
1 TABLET ORAL ONCE AS NEEDED
Status: DISCONTINUED | OUTPATIENT
Start: 2024-11-11 | End: 2024-11-11

## 2024-11-11 RX ORDER — SCOLOPAMINE TRANSDERMAL SYSTEM 1 MG/1
1 PATCH, EXTENDED RELEASE TRANSDERMAL ONCE
Status: DISCONTINUED | OUTPATIENT
Start: 2024-11-11 | End: 2024-11-11 | Stop reason: HOSPADM

## 2024-11-11 RX ORDER — HEPARIN SODIUM 5000 [USP'U]/ML
5000 INJECTION, SOLUTION INTRAVENOUS; SUBCUTANEOUS ONCE
Status: COMPLETED | OUTPATIENT
Start: 2024-11-11 | End: 2024-11-11

## 2024-11-11 RX ORDER — HEPARIN SODIUM 5000 [USP'U]/ML
INJECTION, SOLUTION INTRAVENOUS; SUBCUTANEOUS
Status: DISCONTINUED
Start: 2024-11-11 | End: 2024-11-11

## 2024-11-11 RX ORDER — LIDOCAINE HYDROCHLORIDE 10 MG/ML
INJECTION, SOLUTION EPIDURAL; INFILTRATION; INTRACAUDAL; PERINEURAL AS NEEDED
Status: DISCONTINUED | OUTPATIENT
Start: 2024-11-11 | End: 2024-11-11 | Stop reason: SURG

## 2024-11-11 RX ORDER — HYDROCODONE BITARTRATE AND ACETAMINOPHEN 5; 325 MG/1; MG/1
2 TABLET ORAL ONCE AS NEEDED
Status: DISCONTINUED | OUTPATIENT
Start: 2024-11-11 | End: 2024-11-11

## 2024-11-11 RX ORDER — TRAMADOL HYDROCHLORIDE 50 MG/1
50 TABLET ORAL EVERY 6 HOURS PRN
Qty: 7 TABLET | Refills: 0 | Status: SHIPPED | OUTPATIENT
Start: 2024-11-11

## 2024-11-11 RX ORDER — VANCOMYCIN HYDROCHLORIDE
15 ONCE
Status: COMPLETED | OUTPATIENT
Start: 2024-11-11 | End: 2024-11-11

## 2024-11-11 RX ORDER — LIDOCAINE HYDROCHLORIDE 10 MG/ML
INJECTION, SOLUTION INFILTRATION; PERINEURAL AS NEEDED
Status: DISCONTINUED | OUTPATIENT
Start: 2024-11-11 | End: 2024-11-11 | Stop reason: HOSPADM

## 2024-11-11 RX ORDER — BUPIVACAINE HYDROCHLORIDE 2.5 MG/ML
INJECTION, SOLUTION EPIDURAL; INFILTRATION; INTRACAUDAL AS NEEDED
Status: DISCONTINUED | OUTPATIENT
Start: 2024-11-11 | End: 2024-11-11 | Stop reason: HOSPADM

## 2024-11-11 RX ORDER — ONDANSETRON 2 MG/ML
4 INJECTION INTRAMUSCULAR; INTRAVENOUS EVERY 6 HOURS PRN
Status: DISCONTINUED | OUTPATIENT
Start: 2024-11-11 | End: 2024-11-11

## 2024-11-11 RX ADMIN — VANCOMYCIN HYDROCHLORIDE 1500 MG: at 12:00:00

## 2024-11-11 RX ADMIN — LIDOCAINE HYDROCHLORIDE 80 MG: 10 INJECTION, SOLUTION EPIDURAL; INFILTRATION; INTRACAUDAL; PERINEURAL at 11:21:00

## 2024-11-11 NOTE — ANESTHESIA PREPROCEDURE EVALUATION
PRE-OP EVALUATION    Patient Name: Marilin Merida    Admit Diagnosis: Breast cyst, right [N60.01]    Pre-op Diagnosis: Breast cyst, right [N60.01]    RIGHT CHEST CYST EXCISION X TWO    Anesthesia Procedure: RIGHT CHEST CYST EXCISION X TWO (Right)    Surgeons and Role:     * Reddy Clemens MD - Primary    Pre-op vitals reviewed.  Temp: 98.3 °F (36.8 °C)  Pulse: 60  Resp: 16  SpO2: 100 %  Body mass index is 36.8 kg/m².    Current medications reviewed.  Hospital Medications:   [Transfer Hold] acetaminophen (Tylenol Extra Strength) tab 1,000 mg  1,000 mg Oral Once    [Transfer Hold] scopolamine (Transderm-Scop) 1 MG/3DAYS patch 1 patch  1 patch Transdermal Once    lactated ringers infusion   Intravenous Continuous    vancomycin (Vancocin) 1.5 g in sodium chloride 0.9% 250mL IVPB premix  15 mg/kg Intravenous Once    And    gentamicin (Garamycin) 380 mg in sodium chloride 0.9% 100 mL IVPB  5 mg/kg (Adjusted) Intravenous Once    [COMPLETED] heparin (Porcine) 5000 UNIT/ML injection 5,000 Units  5,000 Units Subcutaneous Once    heparin (Porcine) 5000 UNIT/ML injection           Outpatient Medications:   Prescriptions Prior to Admission[1]    Allergies: Ceclor, Lexapro [escitalopram], and Lisinopril      Anesthesia Evaluation    Patient summary reviewed.    Anesthetic Complications  (-) history of anesthetic complications         GI/Hepatic/Renal      (+) GERD                           Cardiovascular    Negative cardiovascular ROS.        MET: >4    (+) obesity  (+) hypertension                                     Endo/Other    Negative endo/other ROS.                              Pulmonary                    (+) sleep apnea       Neuro/Psych      (+) depression           (+) neuromuscular disease                     Past Surgical History:   Procedure Laterality Date    Addl neck spine fusion  07/11/2017    CERVICAL 4-CERVICAL 5, CERVICAL 5-CERVICAL 6, CERVICAL 6-CERVICAL 7 ANTERIOR DISCECTOMY AND FUSION WITH  INSTRUMENTATION AND MICROSCOPE DISSECTIONN/AGeneral- Dr. Martinez    Back surgery  2017, 2018    fusion; lumbar and cervical      X 1    Carpal tunnel release Bilateral 2019,2019    Cholecystectomy      Colonoscopy      Colonoscopy N/A 2024    Procedure: COLONOSCOPY;  Surgeon: Jesus Cuellar MD;  Location:  ENDOSCOPY    Excis lumbar disk,one level      Other  2019    left carpal tunnel release    Other  2019    right carpal tunnel release    Other surgical history      Spine surgery procedure unlisted      CERVICLE    Spine surgery procedure unlisted      LUMBAR    Stereotact stim spinal cord      Tonsillectomy       Social History     Socioeconomic History    Marital status:    Tobacco Use    Smoking status: Every Day     Current packs/day: 1.00     Average packs/day: 1 pack/day for 30.0 years (30.0 ttl pk-yrs)     Types: Cigarettes    Smokeless tobacco: Never   Vaping Use    Vaping status: Never Used   Substance and Sexual Activity    Alcohol use: Not Currently     Comment: rarely, a few times a year    Drug use: Yes     Frequency: 7.0 times per week     Types: Cannabis     Comment: daily    Sexual activity: Not Currently   Other Topics Concern    Caffeine Concern Yes     Comment: 1 cup daily coffee    Exercise No     Comment: PT  2 x per week    Reaction to local anesthetic No     History   Drug Use    Frequency: 7.0 times per week    Types: Cannabis     Comment: daily     Available pre-op labs reviewed.     Lab Results   Component Value Date     2024    K 4.3 2024     2024    CO2 31.0 2024    BUN 15 2024    CREATSERUM 1.01 2024    GLU 81 2024    CA 9.4 2024            Airway      Mallampati: II  Mouth opening: >3 FB  TM distance: > 6 cm  Neck ROM: full Cardiovascular    Cardiovascular exam normal.  Rhythm: regular  Rate: normal     Dental  Comment: Denies chipped or loose teeth            Pulmonary    Pulmonary exam normal.  Breath sounds clear to auscultation bilaterally.               Other findings              ASA: 2   Plan: MAC  NPO status verified and patient meets guidelines.        Comment: Plan for MAC with GA back up. Risks include but limited to awareness, oral and dental injury, nausea/vomiting, anaphylaxis, prolonged ventilation and myocardial infarction. All questions were answered to the patient's satisfaction. Patient expressed understanding and wishes to proceed.   Plan/risks discussed with: patient                Present on Admission:  **None**             [1]   Medications Prior to Admission   Medication Sig Dispense Refill Last Dose/Taking    DULoxetine (CYMBALTA) 60 MG Oral Cap DR Particles Take 1 capsule (60 mg total) by mouth daily. To be taken with a 30 mg capsule. 90 capsule 1 11/11/2024 at  7:00 AM    DULoxetine 30 MG Oral Cap DR Particles TAKE ONE CAPSULE BY MOUTH DAILY. TO BE TAKEN WITH A 60MG CAPSULE. 90 capsule 1 11/11/2024 at  7:00 AM    omeprazole 20 MG Oral Capsule Delayed Release Take 1 capsule (20 mg total) by mouth nightly.   11/10/2024 Bedtime    tiZANidine 4 MG Oral Tab Take 0.5 tablets (2 mg total) by mouth nightly.   11/10/2024 Bedtime    Amitriptyline HCl 25 MG Oral Tab Take 1 tablet (25 mg total) by mouth nightly. 30 tablet 2 11/10/2024 Bedtime    Triamterene-HCTZ 37.5-25 MG Oral Cap Take 1 capsule by mouth daily.  0 11/11/2024 Morning    atorvastatin 10 MG Oral Tab Take 1 tablet (10 mg total) by mouth daily.   11/11/2024 at  7:00 AM    Fexofenadine HCl 180 MG Oral Tab Take 1 tablet (180 mg total) by mouth daily.   11/11/2024 at  7:00 AM    predniSONE 20 MG Oral Tab Take 2 tablets (40 mg total) by mouth daily for 3 days. Start on day 2-3 if symptoms persist 6 tablet 0 Unknown    Magnesium 100 MG Oral Tab Take 1 tablet (100 mg total) by mouth daily. (Patient not taking: Reported on 11/9/2024)   Unknown

## 2024-11-11 NOTE — OPERATIVE REPORT
[facility]  Operative Note    Marilin Merida Location: OR   CSN [id number] MRN [id number]    1965 Age 59 year old   Admission Date 2024 Operation Date 2024   Attending Physician Reddy Clemens MD Operating Physician Reddy Clemens MD   PCP Viktor Palomares MD          Patient Name: Marilin Merida    Preoperative Diagnosis: Breast cyst, right [N60.01]    Postoperative Diagnosis: Same as pre-op diagnosis.    Primary Surgeon: Reddy Clemens MD    Anesthesia: MAC    Anesthesiologist: Anesthesiologist.: Jennifer Khalil MD    Procedures: RIGHT CHEST CYST EXCISION X TWO     Specimen: Right chest cyst x 2     Drains: none     Estimated Blood Loss: Blood Output: 5 mL (2024 12:10 PM)       Complications: none    Condition: stable    Indications for Surgery:   Marilin Merida is a 59 year old female who presented with two cysts on her right chest that cause her discomfort and pain. The risks of lesion excision were discussed with the patient and include but are not limited to bleeding, hematoma, infection, incision separation, incomplete excision, recurrence, injury to surrounding structures, need for additional surgery. The patient voiced understanding and agreed to proceed.       Surgical Findings:   2 cm cyst in the right upper inner quadrant of the right breast. Incision 2 cms  1 cm cyst in the right infraclavicular skin, incision 1.5 cm     Description of Procedure:   The patient was transported to the operating room and placed on the operating table in supine position.MAC anesthesia was administered. Preoperative antibiotics were given. The right chest was prepped and draped in sterile fashion. A time-out was performed.    The skin was infiltrated with local anesthetic. An elliptical incision was made around the cyst. The cyst was dissected sharply from all surrounding subcutaneous tissue. The cyst was passed off the field. The resulting incision was irrigated and hemostasis  achieved. The skin was closed in 2 layers , 3-0 vicryl for the deep layer and a 4-0 Monocryl for the superficial layer. The skin incisions were cleansed, irrigated, and injected with local anesthetic. Skin glue was used to seal the incisions.    The same steps were repeated to excise the infraclavicular cyst.     The patient was awakened from anesthesia and brought to the recovery room in good condition. The patient tolerated the procedure well without apparent complication. All sponge, needle, and instrument counts were correct at the end of the case.    Reddy Clemens MD  11/11/2024  12:23 PM

## 2024-11-11 NOTE — ANESTHESIA POSTPROCEDURE EVALUATION
[facility]    Marilin Merida Patient Status:  Hospital Outpatient Surgery   Age/Gender 59 year old female MRN [id number]   Location [facility] PERIOPERATIVE SERVICE Attending Reddy Clemens MD   Hosp Day # 0 PCP iVktor Palomares MD       Anesthesia Post-op Note    RIGHT CHEST CYST EXCISION X TWO    Procedure Summary       Date: 11/11/24 Room / Location:  MAIN OR 10 / [facility] MAIN OR    Anesthesia Start: 1117 Anesthesia Stop: 1225    Procedure: RIGHT CHEST CYST EXCISION X TWO (Right: Chest) Diagnosis:       Breast cyst, right      (Breast cyst, right [N60.01])    Surgeons: Reddy Clemens MD Anesthesiologist: Jennifer Khalil MD    Anesthesia Type: MAC ASA Status: 2            Anesthesia Type: MAC    Vitals Value Taken Time   /82 11/11/24 1247   Temp 96.9 °F (36.1 °C) 11/11/24 1222   Pulse 65 11/11/24 1258   Resp 16 11/11/24 1245   SpO2 95 % 11/11/24 1258   Vitals shown include unfiled device data.    Patient Location: Same Day Surgery    Anesthesia Type: MAC    Airway Patency: patent    Postop Pain Control: adequate    Mental Status: mildly sedated but able to meaningfully participate in the post-anesthesia evaluation    Nausea/Vomiting: none    Cardiopulmonary/Hydration status: stable euvolemic    Complications: no apparent anesthesia related complications    Postop vital signs: stable    Dental Exam: Unchanged from Preop    Patient to be discharged from PACU when criteria met.

## 2024-11-14 ENCOUNTER — PATIENT MESSAGE (OUTPATIENT)
Dept: PHYSICAL MEDICINE AND REHAB | Facility: CLINIC | Age: 59
End: 2024-11-14

## 2024-11-14 ENCOUNTER — TELEPHONE (OUTPATIENT)
Dept: PHYSICAL MEDICINE AND REHAB | Facility: CLINIC | Age: 59
End: 2024-11-14

## 2024-11-14 NOTE — TELEPHONE ENCOUNTER
Incoming call from patient who states that on 10/31 pt notes hives that started on: RIGHT calf; RIGHT posterior knee and a bit to the LEFT leg. Pt reports the rash/hives have now full blown spread to B/L legs and ankles. Pt saw PCP who states that they believe it is a food allergy. Pt went to  on 11/09/2024 which the RN documented: \"  C/o hives slightly itchy started Wednesday to right lower leg, spreading. Yesterday to left elbow and right arm. Head feels itchy. Started new supplements Magnesium. Throat feels dry. No shortness of breath\"      Pt had: Ultrasound guided Left greater trochanteric bursa injection on 10/30/2024 with Dr. Morel. Pt is scheduled for: Left L3/4 facet joint injection with Dr. Morel on 11/25/2024. Due to this upcoming injection, this RN asked pt to send a picture via [facility] so that any allergies can be ruled out prior to upcoming injection. Pt verbalized agreement and understanding and will send [facility] with pictures. RN to await images

## 2024-11-20 ENCOUNTER — PATIENT MESSAGE (OUTPATIENT)
Dept: SURGERY | Facility: CLINIC | Age: 59
End: 2024-11-20

## 2024-11-22 ENCOUNTER — PATIENT MESSAGE (OUTPATIENT)
Dept: PHYSICAL MEDICINE AND REHAB | Facility: CLINIC | Age: 59
End: 2024-11-22

## 2024-11-22 NOTE — TELEPHONE ENCOUNTER
Bobo from patient received      Patient was seen by RAISA Martinez PA on 10/17/24  Patient was referred to physiatry for further conservative management.    \"3. Referral:                 - Physiatry:                              -Referred for further conservative treatment.  Patient with likely left lumbar radiculopathy secondary to degenerative progressing changes L3-4 with synovial cyst.  Consider cyst aspiration and transforaminal epidural steroid injection, will defer to physiatry recommendations.  Patient also with left upper extremity numbness, aggravated with bending at the elbow, suspect left ulnar neuropathy, although negative Tinel's on exam.  Will defer to physiatry for further treatment.  4.  [employer]:                -Patient advised to follow-up with [employer] given her spinal cord stimulator and adjusting her settings.  5. Follow up in 6-8 weeks or call or follow up sooner or go to the ED for any new, worsening or concerning signs or symptoms\"    Patient was seen by Dr. Morel on 10/30/24 who did place order for facet cyst aspiration/rupture under fluoroscopic guidance

## 2024-12-11 ENCOUNTER — TELEPHONE (OUTPATIENT)
Dept: PHYSICAL MEDICINE AND REHAB | Facility: CLINIC | Age: 59
End: 2024-12-11

## 2024-12-11 DIAGNOSIS — M25.559 GREATER TROCHANTERIC PAIN SYNDROME: ICD-10-CM

## 2024-12-11 DIAGNOSIS — M71.38 CYST OF LUMBAR FACET JOINT: Primary | ICD-10-CM

## 2024-12-11 RX ORDER — TRAMADOL HYDROCHLORIDE 50 MG/1
50 TABLET ORAL EVERY 12 HOURS PRN
Qty: 28 TABLET | Refills: 0 | Status: SHIPPED | OUTPATIENT
Start: 2024-12-11 | End: 2024-12-25

## 2024-12-11 NOTE — TELEPHONE ENCOUNTER
Pt had procedure with Dr. Morel on 11/25/24, is still in extreme pain. Pt is wondering if there is anything Dr. Morel could prescribe until her appointment on 12/19/24. Please advise.

## 2024-12-11 NOTE — TELEPHONE ENCOUNTER
Condition update after Procedure.    - Patient had Left L3/4 facet joint injection  (specific procedure) on 11/25/24 (date) with .  - 0% relief.    If pain is worse:  - Pain level prior to procedure:   5  - Current pain level:  8-9    - Pain location:Left lower back and hip and travels down the front of the thigh and also the back of the thigh and the groin. Extreme pain with movement. Sitting, standing, walking aggravates it,.  - Pain description: aching and throbbing  - Current pain treatment: Tylenol and had left over Tramadol but did not help. Unable to take NSAIDS due to GI issues.     - LOV: 10/30/2024 Herve Morel DO    - NOV: 12/19/2024 Herve Morel DO   - Plan from LOV: Asking for pain medication until her follow up appointment on 12/19/24.

## 2024-12-11 NOTE — TELEPHONE ENCOUNTER
Dr Morel responded with \"Will send short course of tramadol for her until our appointment, we may consider repeat intervention to the transforaminal space prior to sending her back to surgical services.\"    Left a voice mail to call back if she has any questions and also sent a [employer] message

## 2024-12-19 ENCOUNTER — TELEPHONE (OUTPATIENT)
Dept: PHYSICAL MEDICINE AND REHAB | Facility: CLINIC | Age: 59
End: 2024-12-19

## 2024-12-19 ENCOUNTER — OFFICE VISIT (OUTPATIENT)
Dept: PHYSICAL MEDICINE AND REHAB | Facility: CLINIC | Age: 59
End: 2024-12-19
Payer: COMMERCIAL

## 2024-12-19 VITALS — BODY MASS INDEX: 36.64 KG/M2 | HEIGHT: 66 IN | WEIGHT: 228 LBS

## 2024-12-19 DIAGNOSIS — M54.16 LUMBAR RADICULOPATHY: Primary | ICD-10-CM

## 2024-12-19 PROCEDURE — 3008F BODY MASS INDEX DOCD: CPT | Performed by: PHYSICAL MEDICINE & REHABILITATION

## 2024-12-19 PROCEDURE — 99214 OFFICE O/P EST MOD 30 MIN: CPT | Performed by: PHYSICAL MEDICINE & REHABILITATION

## 2024-12-19 RX ORDER — CYCLOBENZAPRINE HCL 10 MG
10 TABLET ORAL NIGHTLY
Qty: 30 TABLET | Refills: 0 | Status: SHIPPED | OUTPATIENT
Start: 2024-12-19

## 2024-12-19 NOTE — PROGRESS NOTES
RETURN PATIENT VISIT    CHIEF COMPLAINT  Low back pain, left buttock pain, left radicular pain    INTERVAL HISTORY  Marilin Merida is a 59 year old who was last seen in clinic on 11/25/2020 for following up on left-sided L3-4 facet joint injection with attempted aspiration of facet cyst, denies improvement unfortunately.  States that her back has been more bothersome since the injection.  We have discussed previously potential trial of a transforaminal injection prior to returning her to to the neurosurgical services where she was originally sent from.    She continues to endorse bilateral cramping in the calves with intermittent numbness and tingling in the feet.  Denies weakness and currently rates her level pain 5/10.  She uses tramadol as needed.    Denies red flags.  REVIEW OF SYSTEMS  Review of systems was completed with the patient today as pertinent to today's visit    PHYSICAL EXAMINATION  CONSTITUTIONAL: Well-appearing, in no apparent distress  EYES: No scleral icterus or conjunctival hemorrhage  CARDIOVASCULAR: Skin warm and well-perfused, no peripheral edema  RESPIRATORY: Breathing unlabored without accessory muscle use  PSYCHIATRIC: Alert, cooperative, appropriate mood and affect  SKIN: No lesions or rashes on exposed skin  MUSCULOSKELETAL: Largely stable from previous visits, decent range of motion lumbar spine in flexion and extension, sidebending and rotation, positive facet loading on the left side.  NEUROLOGIC: Neural tension signs are largely negative today, strength is generally intact.  Able to ambulate with a nonantalgic gait.    REVIEW OF PRIOR X-RAYS/STUDIES  No new imaging to review, facet degenerative change with facet cyst at L3-4 with central canal narrowing at that level.    IMPRESSION/DIAGNOSIS  1.    Encounter Diagnosis   Name Primary?    Lumbar radiculopathy Yes         TREATMENT/PLAN  Left L3/4 TFESI with fluorosocpic guidance, local anesthesia.   Refill cyclobnezaprine.      Education was provided regarding the above impression/diagnosis and treatment options/plan were discussed.  All questions were answered during today's visit.  Patient will contact clinic if any other questions or concerns.          Herve Morel DO  Interventional Spine and Sports Medicine Specialist   Physical Medicine and Rehabilitation  [address] [address]    [address] [facility] [place] [address]

## 2024-12-19 NOTE — TELEPHONE ENCOUNTER
Initiated authorization for Left L3/4 TFESI with fluoroscopic guidance. CPT/HCPCS 81441, dx:M54.16 to be done at [facility] with [facility]    Status: Approved  Reference/Authorization # [id number]  Valid: 12/19/2024 - 01/17/2025

## 2024-12-20 NOTE — TELEPHONE ENCOUNTER
Patient has been scheduled for Left L3 transforaminal epidural steroid injections on 1/7/25 at the [facility] with Dr. Morel.   Anesthesia type:  Local  Please note: The [facility] will call the business day prior to discuss the exact time/arrival and additional instructions for your appointment.  Patient was advised that if he/she does receive the covid vaccine it needs to be at least 2 weeks before or after the injection.  Medications and allergies reviewed.  Educated to hold NSAIDS (Aleve, Ibuprofen, Motrin, Advil) and anti-inflammatories (Meloxicam, Naproxen, Diclofenac, Celebrex) and for cervical injections must hold Multi-Vitamins, Vitamin E, Fish Oil/Omega-3.  If patient is receiving IVCS, weight loss oral/injectable medications will need to be held for 7 days prior, ARBs/ACE Inhibitors day of procedure, SGLT2 Inhibitors 4 days prior to procedure and for males: ED Rx for 3 days prior to procedure.   Patient informed to fast 8 hours prior to procedure and 10-12 hours prior to procedure with IVCS if patient is on a weight loss medication.   If on blood thinner, clearance has been received and approved to hold this medication by provider.   Patient informed of [facility]'s  policy:  The patient will require transportation arrangements to and from the procedure, with the  present on site for the entire visit.  Without a , the appointment is subject to cancellation.    [facility] is located in the [facility] [address] [address].   may park in the yellow/purple parking lot.  Patient verbalized understanding and agrees with plan.  Scheduled in Epic: Yes  Scheduled in Surgical Case: Yes  Follow up appointment made: NOV: Visit date not found

## 2024-12-26 ENCOUNTER — OFFICE VISIT (OUTPATIENT)
Facility: CLINIC | Age: 59
End: 2024-12-26
Payer: COMMERCIAL

## 2024-12-26 VITALS
SYSTOLIC BLOOD PRESSURE: 118 MMHG | WEIGHT: 226 LBS | BODY MASS INDEX: 36.32 KG/M2 | DIASTOLIC BLOOD PRESSURE: 82 MMHG | HEIGHT: 66 IN

## 2024-12-26 DIAGNOSIS — N95.1 VASOMOTOR SYMPTOMS DUE TO MENOPAUSE: ICD-10-CM

## 2024-12-26 DIAGNOSIS — Z00.00 ANNUAL PHYSICAL EXAM: ICD-10-CM

## 2024-12-26 DIAGNOSIS — Z12.6 BLADDER CANCER SCREENING: ICD-10-CM

## 2024-12-26 DIAGNOSIS — Z01.419 ENCOUNTER FOR WELL WOMAN EXAM WITH ROUTINE GYNECOLOGICAL EXAM: Primary | ICD-10-CM

## 2024-12-26 DIAGNOSIS — Z12.31 SCREENING MAMMOGRAM FOR BREAST CANCER: ICD-10-CM

## 2024-12-26 LAB
APPEARANCE: CLEAR
BILIRUBIN: NEGATIVE
GLUCOSE (URINE DIPSTICK): NEGATIVE MG/DL
KETONES (URINE DIPSTICK): NEGATIVE MG/DL
LEUKOCYTES: NEGATIVE
NITRITE, URINE: NEGATIVE
OCCULT BLOOD: NEGATIVE
PH, URINE: 7 (ref 4.5–8)
PROTEIN (URINE DIPSTICK): NEGATIVE MG/DL
SPECIFIC GRAVITY: 1.01 (ref 1–1.03)
URINE-COLOR: YELLOW
UROBILINOGEN,SEMI-QN: 0.2 MG/DL (ref 0–1.9)

## 2024-12-26 PROCEDURE — 3079F DIAST BP 80-89 MM HG: CPT | Performed by: OBSTETRICS & GYNECOLOGY

## 2024-12-26 PROCEDURE — 81003 URINALYSIS AUTO W/O SCOPE: CPT | Performed by: OBSTETRICS & GYNECOLOGY

## 2024-12-26 PROCEDURE — 3008F BODY MASS INDEX DOCD: CPT | Performed by: OBSTETRICS & GYNECOLOGY

## 2024-12-26 PROCEDURE — 99396 PREV VISIT EST AGE 40-64: CPT | Performed by: OBSTETRICS & GYNECOLOGY

## 2024-12-26 PROCEDURE — 3074F SYST BP LT 130 MM HG: CPT | Performed by: OBSTETRICS & GYNECOLOGY

## 2024-12-26 RX ORDER — ESTRADIOL 0.5 MG/1
0.5 TABLET ORAL DAILY
Qty: 90 TABLET | Refills: 1 | Status: SHIPPED | OUTPATIENT
Start: 2024-12-26

## 2024-12-26 RX ORDER — MEDROXYPROGESTERONE ACETATE 2.5 MG/1
2.5 TABLET ORAL DAILY
Qty: 90 TABLET | Refills: 3 | Status: SHIPPED | OUTPATIENT
Start: 2024-12-26
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
## 2024-12-27 ENCOUNTER — HOSPITAL ENCOUNTER (OUTPATIENT)
Age: 59
Discharge: HOME | End: 2024-12-27
Attending: EMERGENCY MEDICINE
Payer: COMMERCIAL

## 2024-12-27 VITALS
HEART RATE: 81 BPM | OXYGEN SATURATION: 98 % | RESPIRATION RATE: 20 BRPM | TEMPERATURE: 98 F | SYSTOLIC BLOOD PRESSURE: 139 MMHG | DIASTOLIC BLOOD PRESSURE: 99 MMHG

## 2024-12-27 DIAGNOSIS — J40 SINOBRONCHITIS: Primary | ICD-10-CM

## 2024-12-27 DIAGNOSIS — J32.9 SINOBRONCHITIS: Primary | ICD-10-CM

## 2024-12-27 PROCEDURE — 99214 OFFICE O/P EST MOD 30 MIN: CPT

## 2024-12-27 PROCEDURE — 94640 AIRWAY INHALATION TREATMENT: CPT

## 2024-12-27 RX ORDER — ALBUTEROL SULFATE 90 UG/1
2 INHALANT RESPIRATORY (INHALATION) ONCE
Status: COMPLETED | OUTPATIENT
Start: 2024-12-27 | End: 2024-12-27

## 2024-12-28 RX ORDER — AZITHROMYCIN 250 MG/1
TABLET, FILM COATED ORAL
Qty: 6 TABLET | Refills: 0 | Status: SHIPPED | OUTPATIENT
Start: 2024-12-28 | End: 2025-01-02

## 2024-12-28 RX ORDER — PREDNISONE 20 MG/1
40 TABLET ORAL DAILY
Qty: 10 TABLET | Refills: 0 | Status: SHIPPED | OUTPATIENT
Start: 2024-12-28 | End: 2025-01-02

## 2024-12-28 RX ORDER — BENZONATATE 200 MG/1
200 CAPSULE ORAL 3 TIMES DAILY PRN
Qty: 30 CAPSULE | Refills: 0 | Status: SHIPPED | OUTPATIENT
Start: 2024-12-28 | End: 2025-01-07
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
## 2024-12-28 NOTE — DISCHARGE INSTRUCTIONS
Follow-up with your primary care doctor  Use albuterol inhaler 2 puffs every 4 hours  Take prednisone 40 mg a day for 5 days  Take Zithromax for 5 days  Take benzonatate cough medicine 3 times a day as needed  Return if any worsening symptoms or new concerns

## 2025-01-07 ENCOUNTER — OFFICE VISIT (OUTPATIENT)
Dept: SURGERY | Facility: CLINIC | Age: 60
End: 2025-01-07
Payer: COMMERCIAL

## 2025-01-07 DIAGNOSIS — M51.362 DEGENERATION OF INTERVERTEBRAL DISC OF LUMBAR REGION WITH DISCOGENIC BACK PAIN AND LOWER EXTREMITY PAIN: Primary | ICD-10-CM

## 2025-01-07 PROBLEM — M54.16 LUMBAR RADICULOPATHY: Status: ACTIVE | Noted: 2025-01-07

## 2025-01-07 RX ORDER — ACETAMINOPHEN AND CODEINE PHOSPHATE 300; 30 MG/1; MG/1
1 TABLET ORAL EVERY 12 HOURS PRN
Qty: 24 TABLET | Refills: 0 | Status: SHIPPED | OUTPATIENT
Start: 2025-01-07

## 2025-01-15 RX ORDER — CYCLOBENZAPRINE HCL 10 MG
10 TABLET ORAL NIGHTLY
Qty: 30 TABLET | Refills: 0 | Status: SHIPPED | OUTPATIENT
Start: 2025-01-15

## 2025-01-15 NOTE — TELEPHONE ENCOUNTER
Refill Request    Medication request: cyclobenzaprine 10 MG Oral Tab. Take 1 tablet (10 mg total) by mouth nightly.      LOV:12/19/2024 Herve Morel DO   Due back to clinic per last office note:  injection  NOV: 1/22/2025 Herve Morel DO      [place] 12/19/24 #30 (30 days)    Urine drug screen (if applicable): N/A  Pain contract: N/A    LOV plan (if weaning or changing medications): Refill cyclobnezaprine.        Medication warning screen showed. Routing to Dr Morel.

## 2025-01-20 RX ORDER — CYCLOBENZAPRINE HCL 10 MG
10 TABLET ORAL NIGHTLY
Qty: 30 TABLET | Refills: 0 | OUTPATIENT
Start: 2025-01-20

## 2025-01-22 ENCOUNTER — OFFICE VISIT (OUTPATIENT)
Dept: PHYSICAL MEDICINE AND REHAB | Facility: CLINIC | Age: 60
End: 2025-01-22
Payer: COMMERCIAL

## 2025-01-22 VITALS — BODY MASS INDEX: 36.32 KG/M2 | WEIGHT: 226 LBS | HEIGHT: 66 IN

## 2025-01-22 DIAGNOSIS — M54.16 LUMBAR RADICULOPATHY: Primary | ICD-10-CM

## 2025-01-22 DIAGNOSIS — M71.38 CYST OF LUMBAR FACET JOINT: ICD-10-CM

## 2025-01-22 PROCEDURE — 99214 OFFICE O/P EST MOD 30 MIN: CPT | Performed by: PHYSICAL MEDICINE & REHABILITATION

## 2025-01-22 PROCEDURE — 3008F BODY MASS INDEX DOCD: CPT | Performed by: PHYSICAL MEDICINE & REHABILITATION

## 2025-01-22 NOTE — PROGRESS NOTES
RETURN PATIENT VISIT    CHIEF COMPLAINT  Left sided radicular pain     INTERVAL HISTORY  Marilin Merida is a 59 year old who was last seen in clinic on 1/7/25, at that visit she underwent left L3 TFESI, this provided her with robust relief of about 75 % with improvement in the radicular symptoms. This however returned shortly, and her pain has fully returned at this point with pain down the leg to the foot.     She has tried two separate injections, with diagnostic responses, but no lasting therapeutic response.     She remains on as needed Flexeril as well as Tylenol.    REVIEW OF SYSTEMS  Review of systems was completed with the patient today as pertinent to today's visit    PHYSICAL EXAMINATION  CONSTITUTIONAL: Well-appearing, in no apparent distress  EYES: No scleral icterus or conjunctival hemorrhage  CARDIOVASCULAR: Skin warm and well-perfused, no peripheral edema  RESPIRATORY: Breathing unlabored without accessory muscle use  PSYCHIATRIC: Alert, cooperative, appropriate mood and affect  SKIN: No lesions or rashes on exposed skin  MUSCULOSKELETAL: These range of motion of the lumbar spine.  Positive facet loading on the left mildly with positive neural tension on the left with reproductive pain traveling down into the thigh.  NEUROLOGIC: Stable strength in the upper and lower extremities.    REVIEW OF PRIOR X-RAYS/STUDIES  facet degenerative change with facet cyst at L3-4 with central canal narrowing at that level.    IMPRESSION/DIAGNOSIS  1.    Encounter Diagnoses   Name Primary?    Lumbar radiculopathy Yes    Cyst of lumbar facet joint          TREATMENT/PLAN  Patient will return back to the neurosurgical services, she has had diagnostic injections with relief without lasting benefit, at this point further interventions would not be indicated.  Okay to continue with cyclobenzaprine as needed.    Education was provided regarding the above impression/diagnosis and treatment options/plan were discussed.  All  questions were answered during today's visit.  Patient will contact clinic if any other questions or concerns.          Herve Morel DO  Interventional Spine and Sports Medicine Specialist   Physical Medicine and Rehabilitation  [facility]  [address] [place] [id number]    [facility]  [address]. [address] [place] [address]

## 2025-01-23 ENCOUNTER — PATIENT MESSAGE (OUTPATIENT)
Dept: SURGERY | Facility: CLINIC | Age: 60
End: 2025-01-23

## 2025-01-23 NOTE — TELEPHONE ENCOUNTER
Message below noted.    Patient was sent back to NSGY by Dr. Morel as they couldn't get rid of cyst. Patient follow-up appointment is for 2/21, but is wanting something sooner. Provider has no sooner appointments available. Patient has been added to cancellation/wait-list.     Patient saw Dr. Morel 1/22/25  \"TREATMENT/PLAN  Patient will return back to the neurosurgical services, she has had diagnostic injections with relief without lasting benefit, at this point further interventions would not be indicated.  Okay to continue with cyclobenzaprine as needed.\"    LOV 10/17/24  \"PLAN:   1. Medication: None prescribed  2. Imaging:                 - Reviewed today:                              - MRI cervical 3/2024:                                            - Cervical DDD with post operative changes noted                              - MRI lumbar:                                            - Lumbar DDD with prior fusion. L3-4 with progressing degenerative changes since 2020, now with spinal stenosis and synovial cyst                              - XR thoracic:                                            - Agree with radiology. Leads appear stable. Reviewed with Dr. Martin                - Ordered today:                              - None  3. Referral:                 - Physiatry:                              -Referred for further conservative treatment.  Patient with likely left lumbar radiculopathy secondary to degenerative progressing changes L3-4 with synovial cyst.  Consider cyst aspiration and transforaminal epidural steroid injection, will defer to physiatry recommendations.  Patient also with left upper extremity numbness, aggravated with bending at the elbow, suspect left ulnar neuropathy, although negative Tinel's on exam.  Will defer to physiatry for further treatment.  4.  [employer]:                -Patient advised to follow-up with [employer] given her spinal cord stimulator and adjusting her settings.  5.  Follow up in 6-8 weeks or call or follow up sooner or go to the ED for any new, worsening or concerning signs or symptoms\"    Routed to Provider.

## 2025-01-23 NOTE — TELEPHONE ENCOUNTER
Message below noted.    Advised patient of message and to reach back out with any other questions or concerns.

## 2025-01-23 NOTE — TELEPHONE ENCOUNTER
Okay to see me if sooner availability to see Dr. Martin as well.     Continue with waitlist/cancellation list. Will add to list if Dr. Martin opens a clinic in the future.
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
## 2025-01-28 ENCOUNTER — OFFICE VISIT (OUTPATIENT)
Facility: CLINIC | Age: 60
End: 2025-01-28
Payer: COMMERCIAL

## 2025-01-28 ENCOUNTER — TELEPHONE (OUTPATIENT)
Dept: SURGERY | Facility: CLINIC | Age: 60
End: 2025-01-28

## 2025-01-28 VITALS
OXYGEN SATURATION: 98 % | HEIGHT: 66 IN | RESPIRATION RATE: 16 BRPM | WEIGHT: 232 LBS | BODY MASS INDEX: 37.28 KG/M2 | HEART RATE: 76 BPM | SYSTOLIC BLOOD PRESSURE: 120 MMHG | DIASTOLIC BLOOD PRESSURE: 70 MMHG

## 2025-01-28 DIAGNOSIS — E66.09 CLASS 2 OBESITY DUE TO EXCESS CALORIES WITHOUT SERIOUS COMORBIDITY WITH BODY MASS INDEX (BMI) OF 36.0 TO 36.9 IN ADULT: ICD-10-CM

## 2025-01-28 DIAGNOSIS — E66.812 CLASS 2 OBESITY DUE TO EXCESS CALORIES WITHOUT SERIOUS COMORBIDITY WITH BODY MASS INDEX (BMI) OF 36.0 TO 36.9 IN ADULT: ICD-10-CM

## 2025-01-28 DIAGNOSIS — I10 PRIMARY HYPERTENSION: ICD-10-CM

## 2025-01-28 DIAGNOSIS — G47.33 OSA ON CPAP: Primary | ICD-10-CM

## 2025-01-28 PROCEDURE — 3074F SYST BP LT 130 MM HG: CPT | Performed by: PHYSICIAN ASSISTANT

## 2025-01-28 PROCEDURE — 99214 OFFICE O/P EST MOD 30 MIN: CPT | Performed by: PHYSICIAN ASSISTANT

## 2025-01-28 PROCEDURE — 3078F DIAST BP <80 MM HG: CPT | Performed by: PHYSICIAN ASSISTANT

## 2025-01-28 PROCEDURE — 3008F BODY MASS INDEX DOCD: CPT | Performed by: PHYSICIAN ASSISTANT

## 2025-01-28 NOTE — PATIENT INSTRUCTIONS
Discussed the addition of a mask liner. Options reviewed including REMzzz, Snugz, or permanent mask liners ([email or web address]).  This should help address leak and skin irritation.      Airtouch F20

## 2025-01-28 NOTE — TELEPHONE ENCOUNTER
Called and spoke to patient and notified her that Dr. Martin has opened up a clinic tomorrow. We have booked her an appointment for failed spinal cyst aspiration at 11:30. Asked for patient to return call to let us know either way if appointment works for her.

## 2025-01-28 NOTE — PROGRESS NOTES
This is a 59 year old female who presents with the following symptoms, risk factors, behaviors or other items associated with sleep problems.    Sleep Apnea:   (Patient-Rptd) nasal congestion; restless sleep; overweight; high blood pressure; family member with sleep apnea; previous sleep study; current CPAP use  Insomnia:  (Patient-Rptd) restless sleep; pain or discomfort; depresssion; anxiety  Restless Leg:  (Patient-Rptd) urge to move legs when trying to sleep; tingling or crawly feeling in the legs; urge to move is worse when seated or lying; symptoms are worse in the evening or at bedtime  Parasomnias:   (Patient-Rptd) no symptoms of parasomnias  Daytime Problems:  (Patient-Rptd) no symptoms of daytime problems    The patient's [place] Sleepiness score is (Patient-Rptd) 1/24.

## 2025-01-29 ENCOUNTER — OFFICE VISIT (OUTPATIENT)
Dept: SURGERY | Facility: CLINIC | Age: 60
End: 2025-01-29
Payer: COMMERCIAL

## 2025-01-29 ENCOUNTER — PATIENT MESSAGE (OUTPATIENT)
Dept: SURGERY | Facility: CLINIC | Age: 60
End: 2025-01-29

## 2025-01-29 VITALS
DIASTOLIC BLOOD PRESSURE: 90 MMHG | BODY MASS INDEX: 36.96 KG/M2 | WEIGHT: 230 LBS | HEART RATE: 78 BPM | HEIGHT: 66 IN | SYSTOLIC BLOOD PRESSURE: 132 MMHG

## 2025-01-29 DIAGNOSIS — Z98.1 S/P LUMBAR FUSION: ICD-10-CM

## 2025-01-29 DIAGNOSIS — M54.16 LEFT LUMBAR RADICULOPATHY: ICD-10-CM

## 2025-01-29 DIAGNOSIS — M25.552 LEFT HIP PAIN: Primary | ICD-10-CM

## 2025-01-29 DIAGNOSIS — Z98.1 HISTORY OF LUMBAR FUSION: ICD-10-CM

## 2025-01-29 PROCEDURE — 3008F BODY MASS INDEX DOCD: CPT | Performed by: NEUROLOGICAL SURGERY

## 2025-01-29 PROCEDURE — 3080F DIAST BP >= 90 MM HG: CPT | Performed by: NEUROLOGICAL SURGERY

## 2025-01-29 PROCEDURE — 99214 OFFICE O/P EST MOD 30 MIN: CPT | Performed by: NEUROLOGICAL SURGERY

## 2025-01-29 PROCEDURE — 3075F SYST BP GE 130 - 139MM HG: CPT | Performed by: NEUROLOGICAL SURGERY

## 2025-01-29 NOTE — PATIENT INSTRUCTIONS
Refill policies:    Allow 2-3 business days for refills; controlled substances may take longer.  Contact your pharmacy at least 5 days prior to running out of medication and have them send an electronic request or submit request through the “request refill” option in your [employer] account.  Refills are not addressed on weekends; covering physicians do not authorize routine medications on weekends.  No narcotics or controlled substances are refilled after noon on Fridays or by on call physicians.  By law, narcotics must be electronically prescribed.  A 30 day supply with no refills is the maximum allowed.  If your prescription is due for a refill, you may be due for a follow up appointment.  To best provide you care, patients receiving routine medications need to be seen at least once a year.  Patients receiving narcotic/controlled substance medications need to be seen at least once every 3 months.  In the event that your preferred pharmacy does not have the requested medication in stock (e.g. Backordered), it is your responsibility to find another pharmacy that has the requested medication available.  We will gladly send a new prescription to that pharmacy at your request.    Scheduling Tests:    If your physician has ordered radiology tests such as MRI or CT scans, please contact Central Scheduling at [phone] right away to schedule the test.  Once scheduled, the [facility] Centralized Referral Team will work with your insurance carrier to obtain pre-certification or prior authorization.  Depending on your insurance carrier, approval may take 3-10 days.  It is highly recommended patients assure they have received an authorization before having a test performed.  If test is done without insurance authorization, patient may be responsible for the entire amount billed.      Precertification and Prior Authorizations:  If your physician has recommended that you have a procedure or additional testing performed the [facility]  Centralized Referral Team will contact your insurance carrier to obtain pre-certification or prior authorization.    You are strongly encouraged to contact your insurance carrier to verify that your procedure/test has been approved and is a COVERED benefit.  Although the [facility] Centralized Referral Team does its due diligence, the insurance carrier gives the disclaimer that \"Although the procedure is authorized, this does not guarantee payment.\"    Ultimately the patient is responsible for payment.   Thank you for your understanding in this matter.  Paperwork Completion:  If you require FMLA or disability paperwork for your recovery, please make sure to either drop it off or have it faxed to our office at [phone]. Be sure the form has your name and date of birth on it.  The form will be faxed to our Forms Department and they will complete it for you.  There is a 25$ fee for all forms that need to be filled out.  Please be aware there is a 10-14 day turnaround time.  You will need to sign a release of information (ROOSEVELT) form if your paperwork does not come with one.  You may call the Forms Department with any questions at [phone].  Their fax number is [phone].

## 2025-01-29 NOTE — PROGRESS NOTES
Established patient:  Reason for follow up: Follow up after physiatry     Injections helped for only a few hours, they tried to aspirate cyst and were unable. Pain is significantly worse.     Numeric Rating Scale:    Pain at Present:  7/10   nothing seems to improve the pain   Pain is worse upon moving the left leg, standing worsens pain, lifting her arms above her head sends shooting pain down the legs. Starting to have pain in the back of the right thigh.   Stimulator is not helping the pain in either leg. Was placed in August of 2020.

## 2025-01-29 NOTE — PROGRESS NOTES
Neurosurgery Clinic Visit  2025    Marilin Merida PCP:  Viktor Palomares MD    1965 MRN [id number]     HISTORY OF PRESENT ILLNESS:  Marilin Merida is a(n) 59 year old female here for follow-up  She got a facet cyst aspiration in November which did not give her any relief  She had a left transforaminal L3-4 which gave her relief for about a day.  She is complaining of some pain in the back of her right leg  This is more recent  She is having pain down her left leg to her groin to her lateral thigh to the back of her leg down her lateral shin to the outer ankle sometimes the top of her foot.  They have adjusted her stimulator in the past without getting much relief  She is here for further evaluation.      PHYSICAL EXAMINATION:  Vital Signs:  /90 (BP Location: Left arm, Patient Position: Sitting, Cuff Size: adult)   Pulse 78   Ht 66\"   Wt 230 lb (104.3 kg)   LMP 2019 (Exact Date)   BMI 37.12 kg/m²   Awake alert, orient x 3  Follows pain/4  Strength 5 5 bilateral lower extremities  Sensation of light touch  Tender to palpation both SI joints  Elieser's positive on the left  SLR is negative      REVIEW OF STUDIES:    MRI prior to aspiration shows stenosis at L3-4 adjacent to her previous fusion as well as a facet cyst  X-rays showed no abnormal movement      ASSESSMENT and PLAN:  59-year-old female with left-sided leg pain greater than right  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  She does not describe L3 radiculopathy but got relief from an L3 injection  She has more stenosis L3-4 causing stenosis on the L4 nerve roots  Will get new MRI as she has had a cyst aspiration since the last 1  Will get a CT scan to look at her bony anatomy  Will get a left hip x-ray just to look at her anatomy as she is having some pain there as well  I will see her back on few weeks  Date given  Reviewed films in detail  All questions were answered  Patient to discuss appointment today with  track to be here as well  Patient appreciative          Time spent on counseling/coordination of care:  30 Minutes    Total time spent with patient:  30 Minutes      George Martin MD   [facility] [place]  1/29/2025  12:39 PM    Dictated but not proofread

## 2025-01-30 NOTE — TELEPHONE ENCOUNTER
Pt would like to have MRI at [facility], if appropriate please call pt once orders reordered under [facility] , pt has ph# to [facility]. Pt will keep all appts for now.

## 2025-01-30 NOTE — TELEPHONE ENCOUNTER
Noted that patient has contacted the office via phone and [facility] asking about placing an imaging order for MRI to be done at [facility].     New order placed and patient has been notified of above update via MyChart message.

## 2025-01-31 ENCOUNTER — HOSPITAL ENCOUNTER (OUTPATIENT)
Dept: GENERAL RADIOLOGY | Age: 60
Discharge: HOME | End: 2025-01-31
Attending: NEUROLOGICAL SURGERY
Payer: COMMERCIAL

## 2025-01-31 ENCOUNTER — TELEPHONE (OUTPATIENT)
Dept: SURGERY | Facility: CLINIC | Age: 60
End: 2025-01-31

## 2025-01-31 DIAGNOSIS — M25.552 LEFT HIP PAIN: ICD-10-CM

## 2025-01-31 PROCEDURE — 73503 X-RAY EXAM HIP UNI 4/> VIEWS: CPT | Performed by: NEUROLOGICAL SURGERY

## 2025-01-31 NOTE — TELEPHONE ENCOUNTER
Noted that patient is now planning on obtaining MRI at [facility].  Messaged patient, informing her that the [facility] MRI orders remain active and she may ask to be placed on a wait list with Central Scheduling.      2/20/2025 4:00 PM George Martin MD      2/26/2025 5:15 PM  MR RM2 (1.5T WIDE)       [facility] MRI orders canceled.

## 2025-01-31 NOTE — TELEPHONE ENCOUNTER
Noted that patient is now planning on obtaining MRI at [facility].  Messaged patient, informing her that the [facility] MRI orders remain active and she may ask to be placed on a wait list with Central Scheduling.     2/20/2025 4:00 PM George Martin MD     2/26/2025 5:15 PM  MR RM2 (1.5T WIDE)

## 2025-01-31 NOTE — TELEPHONE ENCOUNTER
Patient not able to get MRI @ [facility] due to spinal cord stimulator. Facility weill need to be updated  back to [facility].

## 2025-02-06 ENCOUNTER — HOSPITAL ENCOUNTER (OUTPATIENT)
Dept: CT IMAGING | Facility: HOSPITAL | Age: 60
Discharge: HOME | End: 2025-02-06
Attending: NEUROLOGICAL SURGERY
Payer: COMMERCIAL

## 2025-02-06 DIAGNOSIS — Z98.1 HISTORY OF LUMBAR FUSION: ICD-10-CM

## 2025-02-06 DIAGNOSIS — M25.552 LEFT HIP PAIN: ICD-10-CM

## 2025-02-06 DIAGNOSIS — M54.16 LEFT LUMBAR RADICULOPATHY: ICD-10-CM

## 2025-02-06 PROCEDURE — 72131 CT LUMBAR SPINE W/O DYE: CPT | Performed by: NEUROLOGICAL SURGERY

## 2025-02-13 RX ORDER — CYCLOBENZAPRINE HCL 10 MG
10 TABLET ORAL NIGHTLY
Qty: 30 TABLET | Refills: 0 | Status: SHIPPED | OUTPATIENT
Start: 2025-02-13

## 2025-02-13 NOTE — TELEPHONE ENCOUNTER
Refill Request    Medication request: CYCLOBENZAPRINE 10 MG Oral Tab  Take 1 tablet by mouth nightly     LOV:1/22/2025 Herve Morel,    Due back to clinic per last office note:  not mentioned, patient to follow back up with neurosurgery  NOV: Visit date not found      [place]: 1/16/25 #30 - 30 day supply    Urine drug screen (if applicable): n/a  Pain contract: n/a    LOV plan (if weaning or changing medications): Per  at last office visit: \"Okay to continue with cyclobenzaprine as needed. \"      Due to pop up message, rx forwarded on to  for review/approval.

## 2025-02-19 ENCOUNTER — PATIENT MESSAGE (OUTPATIENT)
Dept: SURGERY | Facility: CLINIC | Age: 60
End: 2025-02-19

## 2025-02-19 NOTE — TELEPHONE ENCOUNTER
Notified patient of feedback regarding appointment and response given via [employer] message.     Routed to PSR to cancel/reschedule appointment.

## 2025-02-19 NOTE — TELEPHONE ENCOUNTER
This writer attempted to reach patient with no answer. Left voicemail to return office call and reschedule visit after her MRI is completed, due to complete 2/26/25.

## 2025-02-19 NOTE — TELEPHONE ENCOUNTER
Patient will need to complete MRI prior to visit. Please cancel appt. And have patient reschedule when MRI is complete.     Please block 4pm slot as well, as this was only opened for the patient    Thank you

## 2025-02-19 NOTE — TELEPHONE ENCOUNTER
Noted that patient has messaged, asking if she should come in for her appointment despite not having completed ordered MRI.  Patient states she has completed CT and X-rays, however.     Per Dr. 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 Martin:  [facility] Future Appointments    Encounter Information   Provider Department Center   2/20/2025 4:00 PM George Martin MD       Routed to CHRISS Flores.

## 2025-02-24 ENCOUNTER — HOSPITAL ENCOUNTER (OUTPATIENT)
Dept: MAMMOGRAPHY | Age: 60
Discharge: HOME | End: 2025-02-24
Attending: OBSTETRICS & GYNECOLOGY
Payer: COMMERCIAL

## 2025-02-24 DIAGNOSIS — Z12.31 SCREENING MAMMOGRAM FOR BREAST CANCER: ICD-10-CM

## 2025-02-24 PROCEDURE — 77063 BREAST TOMOSYNTHESIS BI: CPT | Performed by: OBSTETRICS & GYNECOLOGY

## 2025-02-24 PROCEDURE — 77067 SCR MAMMO BI INCL CAD: CPT | Performed by: OBSTETRICS & GYNECOLOGY

## 2025-02-26 ENCOUNTER — HOSPITAL ENCOUNTER (OUTPATIENT)
Dept: MRI IMAGING | Facility: HOSPITAL | Age: 60
Discharge: HOME | End: 2025-02-26
Attending: NEUROLOGICAL SURGERY
Payer: COMMERCIAL

## 2025-02-26 DIAGNOSIS — Z98.1 HISTORY OF LUMBAR FUSION: ICD-10-CM

## 2025-02-26 DIAGNOSIS — M25.552 LEFT HIP PAIN: ICD-10-CM

## 2025-02-26 DIAGNOSIS — M54.16 LEFT LUMBAR RADICULOPATHY: ICD-10-CM

## 2025-02-26 PROCEDURE — 72158 MRI LUMBAR SPINE W/O & W/DYE: CPT | Performed by: NEUROLOGICAL SURGERY

## 2025-02-26 PROCEDURE — A9575 INJ GADOTERATE MEGLUMI 0.1ML: HCPCS | Performed by: NEUROLOGICAL SURGERY

## 2025-02-26 RX ORDER — GADOTERATE MEGLUMINE 376.9 MG/ML
20 INJECTION INTRAVENOUS
Status: COMPLETED | OUTPATIENT
Start: 2025-02-26 | End: 2025-02-26

## 2025-02-26 RX ADMIN — GADOTERATE MEGLUMINE 20 ML: 376.9 INJECTION INTRAVENOUS at 18:10:00

## 2025-03-06 ENCOUNTER — OFFICE VISIT (OUTPATIENT)
Dept: SURGERY | Facility: CLINIC | Age: 60
End: 2025-03-06
Payer: COMMERCIAL

## 2025-03-06 VITALS — HEART RATE: 83 BPM | SYSTOLIC BLOOD PRESSURE: 120 MMHG | DIASTOLIC BLOOD PRESSURE: 64 MMHG

## 2025-03-06 DIAGNOSIS — Z98.1 S/P LUMBAR FUSION: Primary | ICD-10-CM

## 2025-03-06 DIAGNOSIS — M54.16 LEFT LUMBAR RADICULOPATHY: ICD-10-CM

## 2025-03-06 DIAGNOSIS — M71.30 SYNOVIAL CYST: ICD-10-CM

## 2025-03-06 PROCEDURE — 99215 OFFICE O/P EST HI 40 MIN: CPT | Performed by: NEUROLOGICAL SURGERY

## 2025-03-06 PROCEDURE — 3074F SYST BP LT 130 MM HG: CPT | Performed by: NEUROLOGICAL SURGERY

## 2025-03-06 PROCEDURE — 3078F DIAST BP <80 MM HG: CPT | Performed by: NEUROLOGICAL SURGERY

## 2025-03-06 NOTE — PATIENT INSTRUCTIONS
Refill policies:    Allow 2-3 business days for refills; controlled substances may take longer.  Contact your pharmacy at least 5 days prior to running out of medication and have them send an electronic request or submit request through the “request refill” option in your [employer] account.  Refills are not addressed on weekends; covering physicians do not authorize routine medications on weekends.  No narcotics or controlled substances are refilled after noon on Fridays or by on call physicians.  By law, narcotics must be electronically prescribed.  A 30 day supply with no refills is the maximum allowed.  If your prescription is due for a refill, you may be due for a follow up appointment.  To best provide you care, patients receiving routine medications need to be seen at least once a year.  Patients receiving narcotic/controlled substance medications need to be seen at least once every 3 months.  In the event that your preferred pharmacy does not have the requested medication in stock (e.g. Backordered), it is your responsibility to find another pharmacy that has the requested medication available.  We will gladly send a new prescription to that pharmacy at your request.    Scheduling Tests:    If your physician has ordered radiology tests such as MRI or CT scans, please contact Central Scheduling at [phone] right away to schedule the test.  Once scheduled, the [facility] Centralized Referral Team will work with your insurance carrier to obtain pre-certification or prior authorization.  Depending on your insurance carrier, approval may take 3-10 days.  It is highly recommended patients assure they have received an authorization before having a test performed.  If test is done without insurance authorization, patient may be responsible for the entire amount billed.      Precertification and Prior Authorizations:  If your physician has recommended that you have a procedure or additional testing performed the [facility]  Centralized Referral Team will contact your insurance carrier to obtain pre-certification or prior authorization.    You are strongly encouraged to contact your insurance carrier to verify that your procedure/test has been approved and is a COVERED benefit.  Although the [facility] Centralized Referral Team does its due diligence, the insurance carrier gives the disclaimer that \"Although the procedure is authorized, this does not guarantee payment.\"    Ultimately the patient is responsible for payment.   Thank you for your understanding in this matter.  Paperwork Completion:  If you require FMLA or disability paperwork for your recovery, please make sure to either drop it off or have it faxed to our office at [phone]. Be sure the form has your name and date of birth on it.  The form will be faxed to our Forms Department and they will complete it for you.  There is a 25$ fee for all forms that need to be filled out.  Please be aware there is a 10-14 day turnaround time.  You will need to sign a release of information ([employer]T) form if your paperwork does not come with one.  You may call the Forms Department with any questions at [phone].  Their fax number is [phone].

## 2025-03-06 NOTE — PROGRESS NOTES
Established patient:  Reason for follow up:   Review imaging results-left lumbar radiculopathy     Numeric Rating Scale:       Pain at Present:  7/10       New imaging or testing since your last office visit:    MRI spine lumbar DOS 02/26/25  CT spine lumbar DOS 02/06/25  XR hip+pelvis DOS 01/31/25

## 2025-03-06 NOTE — PROGRESS NOTES
Neurosurgery Clinic Visit  3/6/2025    Marilin Merida PCP:  Viktor Palomares MD    1965 MRN [id number]     HISTORY OF PRESENT ILLNESS:  Marilin Merida is a(n) 59 year old female here for follow-up  She got her MRI and x-rays and CT  She is of the same issues  She complain of pain down her left side  The stimulator is not covering it  [employer] will be here to attempt to cover her pain  She complains of pain in the front of the thighs  Pain down the left side  Her last injection shot shinsplints through her body from her waist down  She did get relief of pain for short period time  She is here for follow-up      PHYSICAL EXAMINATION:  Vital Signs:  /64   Pulse 83   LMP 2019 (Exact Date)   Awake alert, orient x 3  Follows commands x 4      REVIEW OF STUDIES:    MRI reviewed which shows the cyst is gone she still has stenosis L3-4  CT scan shows bony overgrowth at her joints at 3 4  X-rays of her thigh spine show good placement of her stimulator      ASSESSMENT and PLAN:  59-year-old female with lumbar stenosis of the spinal cord stimulator with left-sided pain  Her stimulator during trials today would cover her arms her abdomen at certain point parts of his legs He does not cover all of her pain  She does have stenosis and had some relief of the left L3 with injection  I think be prudent to try a interlaminar L3 4 injection  I sent a note to Dr. Morel to see if we get that done  She will follow-up me after that injection  She would like to turn her stimulator off for few weeks and see how she does with that  That is reasonable to me  I will see her back after injection how she is doing after her stimulator turned off  She is somewhat attempted to have the stimulator removed  All questions were answered  Patient appreciative        Time spent on counseling/coordination of care:  45 Minutes    Total time spent with patient:  45 Minutes      George Martin MD   [facility]  [place]  3/6/2025  5:38 PM   Dictated but not proofread

## 2025-03-07 ENCOUNTER — PATIENT MESSAGE (OUTPATIENT)
Dept: PHYSICAL MEDICINE AND REHAB | Facility: CLINIC | Age: 60
End: 2025-03-07

## 2025-03-07 DIAGNOSIS — M71.38 CYST OF LUMBAR FACET JOINT: ICD-10-CM

## 2025-03-07 DIAGNOSIS — G89.29 CHRONIC BILATERAL LOW BACK PAIN WITH LEFT-SIDED SCIATICA: ICD-10-CM

## 2025-03-07 DIAGNOSIS — M54.16 LUMBAR RADICULOPATHY: Primary | ICD-10-CM

## 2025-03-07 DIAGNOSIS — M54.42 CHRONIC BILATERAL LOW BACK PAIN WITH LEFT-SIDED SCIATICA: ICD-10-CM

## 2025-03-12 NOTE — TELEPHONE ENCOUNTER
Patient has been scheduled for L3-4 Interlaminar epidural steroid injection on 3/17/25 at the [facility] with Dr. Morel.   Anesthesia type:  Local  Please note: The [facility] will call the business day prior to discuss the exact time/arrival and additional instructions for your appointment.  Patient was advised that if he/she does receive the covid vaccine it needs to be at least 2 weeks before or after the injection.  Medications and allergies reviewed.  Educated to hold NSAIDS (Aleve, Ibuprofen, Motrin, Advil) and anti-inflammatories (Meloxicam, Naproxen, Diclofenac, Celebrex) and for cervical injections must hold Multi-Vitamins, Vitamin E, Fish Oil/Omega-3.  Patient informed of [facility]'s  policy:  The patient will require transportation arrangements to and from the procedure, with the  present on site for the entire visit.  Without a , the appointment is subject to cancellation.    [facility] is located in the [facility] 1st floor [address], [place] [address].   may park in the yellow/purple parking lot.  Patient verbalized understanding and agrees with plan.  Scheduled in Epic: Yes  Scheduled in Surgical Case: Yes  Follow up appointment made: NOV: Visit date not found

## 2025-03-12 NOTE — TELEPHONE ENCOUNTER
Initiated authorization for L3-4 Interlaminar Injection under fluoroscopic guidance. CPT/HCPCS 77034 dx:M54.16 to be done at [facility] with [employer] portal.    Status: Approved  Reference/Authorization #  [id number]  Valid: 03/12/2025 - 04/10/2025  Authorization is not a guarantee of payment and may be subject to review once claim is submitted.

## 2025-03-12 NOTE — TELEPHONE ENCOUNTER
Per Dr. Martin's 03/06/2025 OV note:   \"She does have stenosis and had some relief of the left L3 with injection  I think be prudent to try a interlaminar L3 4 injection  I sent a note to Dr. Morel to see if we get that done  She will follow-up me after that injection\"      No order was placed at that time. Order pended and routed to Dr. Morel for his review/signature if appropriate.

## 2025-03-17 RX ORDER — CYCLOBENZAPRINE HCL 10 MG
10 TABLET ORAL NIGHTLY
Qty: 30 TABLET | Refills: 0 | Status: SHIPPED | OUTPATIENT
Start: 2025-03-17

## 2025-03-17 NOTE — TELEPHONE ENCOUNTER
Refill Request    Medication request: CYCLOBENZAPRINE 10 MG Oral Tab  Take 1 tablet by mouth nightly     LOV:1/22/2025 Herve Morel DO   Due back to clinic per last office note:  not mentioned  NOV: Visit date not found      [place]: 2/13/25 #30 - 30 day supply    Urine drug screen (if applicable): n/a  Pain contract: n/a    LOV plan (if weaning or changing medications): Per  at last office visit: \"Patient will return back to the neurosurgical services, she has had diagnostic injections with relief without lasting benefit, at this point further interventions would not be indicated. Okay to continue with cyclobenzaprine as needed. \"

## 2025-04-10 ENCOUNTER — OFFICE VISIT (OUTPATIENT)
Dept: PHYSICAL MEDICINE AND REHAB | Facility: CLINIC | Age: 60
End: 2025-04-10
Payer: COMMERCIAL

## 2025-04-10 VITALS — BODY MASS INDEX: 36.96 KG/M2 | WEIGHT: 230 LBS | HEIGHT: 66 IN

## 2025-04-10 DIAGNOSIS — M79.605 PAIN OF LEFT LOWER EXTREMITY: Primary | ICD-10-CM

## 2025-04-10 DIAGNOSIS — M54.16 LUMBAR RADICULOPATHY: ICD-10-CM

## 2025-04-10 PROCEDURE — 99214 OFFICE O/P EST MOD 30 MIN: CPT | Performed by: PHYSICAL MEDICINE & REHABILITATION

## 2025-04-10 PROCEDURE — 3008F BODY MASS INDEX DOCD: CPT | Performed by: PHYSICAL MEDICINE & REHABILITATION

## 2025-04-10 NOTE — PROGRESS NOTES
RETURN PATIENT VISIT    CHIEF COMPLAINT  Left lumbar radiculopathy    INTERVAL HISTORY  Marilin Merida is a 59 year old who was last seen in clinic on 1/22/2025 following up on L3-4 interlaminar epidural steroid injection which was performed on 3/17/2024.    History of Present Illness  The patient, with a history of chronic back pain who is following up after an L3-4 interlaminar epidural steroid injection, endorses improvement in her pain complaints initially however this again like the other injections wore off pretty quickly.  The patient describes the pain as persistent and unrelieved by the stimulator. The patient also reports additional pain in the knee, hip, and ankle, which is particularly noticeable when lying on her side or walking. The patient has previously received multiple injections for pain relief, which have only provided temporary relief. The patient is scheduled Corey  in May, who has suggested a possible decompression procedure. The patient also mentions a burning sensation in the knee and difficulty walking due to pain.      REVIEW OF SYSTEMS  Review of systems was completed with the patient today as pertinent to today's visit    PHYSICAL EXAMINATION  CONSTITUTIONAL: Well-appearing, in no apparent distress  EYES: No scleral icterus or conjunctival hemorrhage  CARDIOVASCULAR: Skin warm and well-perfused, no peripheral edema  RESPIRATORY: Breathing unlabored without accessory muscle use  PSYCHIATRIC: Alert, cooperative, appropriate mood and affect  SKIN: No lesions or rashes on exposed skin  MUSCULOSKELETAL:   Physical Exam  MUSCULOSKELETAL: Knee with pulling sensation, no significant pain.  Positive slump sit on the left side with reproduction of posterior lateral tension and pain.  Stable strength in the lower extremities.    REVIEW OF PRIOR X-RAYS/STUDIES  No new imaging to review    IMPRESSION/DIAGNOSIS  1.   Encounter Diagnoses   Name Primary?    Pain of left lower extremity Yes    Lumbar  radiculopathy          TREATMENT/PLAN  Assessment & Plan  Chronic back pain with radiculopathy  Persistent radicular symptoms despite spinal cord stimulator and injections.   - Order EMG for leg to assess nerve function.  - Advise contact with Dr. Nicole regarding injection response and earlier appointment.  - Schedule EMG for May 1st at 3:00 PM.  - Finally continue as needed muscle relaxer.      Education was provided regarding the above impression/diagnosis and treatment options/plan were discussed.  All questions were answered during today's visit.  Patient will contact clinic if any other questions or concerns.          Herve Morel DO  Interventional Spine and Sports Medicine Specialist   Physical Medicine and Rehabilitation  [address] [address]    [address] [place] [address]

## 2025-04-18 RX ORDER — CYCLOBENZAPRINE HCL 10 MG
10 TABLET ORAL NIGHTLY
Qty: 30 TABLET | Refills: 0 | Status: SHIPPED | OUTPATIENT
Start: 2025-04-18

## 2025-04-18 NOTE — TELEPHONE ENCOUNTER
Refill Request    Medication request:   CYCLOBENZAPRINE 10 MG Oral Tab       LOV: 4/10/2025 Herve Morel DO   RTC: N/A  NOV: 5/1/2025 Herve Morel DO      [place] 3/17/2025 #30 days    UDS: (if applicable): N/A  Pain contract: N/A    LOV plan (if weaning or changing medications): - Finally continue as needed muscle relaxer.

## 2025-04-30 ENCOUNTER — OFFICE VISIT (OUTPATIENT)
Facility: CLINIC | Age: 60
End: 2025-04-30
Payer: COMMERCIAL

## 2025-04-30 VITALS
SYSTOLIC BLOOD PRESSURE: 120 MMHG | DIASTOLIC BLOOD PRESSURE: 78 MMHG | HEIGHT: 66 IN | BODY MASS INDEX: 37.26 KG/M2 | WEIGHT: 231.81 LBS

## 2025-04-30 DIAGNOSIS — Z79.890 HORMONE REPLACEMENT THERAPY (HRT): Primary | ICD-10-CM

## 2025-04-30 PROCEDURE — 99212 OFFICE O/P EST SF 10 MIN: CPT | Performed by: OBSTETRICS & GYNECOLOGY

## 2025-04-30 PROCEDURE — 3008F BODY MASS INDEX DOCD: CPT | Performed by: OBSTETRICS & GYNECOLOGY

## 2025-04-30 PROCEDURE — 3074F SYST BP LT 130 MM HG: CPT | Performed by: OBSTETRICS & GYNECOLOGY

## 2025-04-30 PROCEDURE — 3078F DIAST BP <80 MM HG: CPT | Performed by: OBSTETRICS & GYNECOLOGY

## 2025-04-30 RX ORDER — PROGESTERONE 200 MG/1
200 CAPSULE ORAL AS DIRECTED
Qty: 90 CAPSULE | Refills: 2 | Status: SHIPPED | OUTPATIENT
Start: 2025-04-30 | End: 2025-04-30 | Stop reason: DRUGHIGH

## 2025-04-30 RX ORDER — ESTRADIOL 1 MG/1
0.5 TABLET ORAL DAILY
Qty: 45 TABLET | Refills: 2 | Status: SHIPPED | OUTPATIENT
Start: 2025-04-30

## 2025-04-30 RX ORDER — PROGESTERONE 200 MG/1
200 CAPSULE ORAL DAILY
Qty: 90 CAPSULE | Refills: 2 | Status: SHIPPED | OUTPATIENT
Start: 2025-04-30
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
## 2025-05-01 ENCOUNTER — PROCEDURE VISIT (OUTPATIENT)
Dept: PHYSICAL MEDICINE AND REHAB | Facility: CLINIC | Age: 60
End: 2025-05-01
Payer: COMMERCIAL

## 2025-05-01 DIAGNOSIS — M79.605 PAIN OF LEFT LOWER EXTREMITY: Primary | ICD-10-CM

## 2025-05-01 PROCEDURE — 95886 MUSC TEST DONE W/N TEST COMP: CPT | Performed by: PHYSICAL MEDICINE & REHABILITATION

## 2025-05-01 PROCEDURE — 95908 NRV CNDJ TST 3-4 STUDIES: CPT | Performed by: PHYSICAL MEDICINE & REHABILITATION

## 2025-05-02 NOTE — PROCEDURES
[facility]  [address] [place] [id number]        Full Name: DANA LEWIS Gender: Female  Patient ID: [id number] YOB: 1965      Visit Date: 5/1/2025 3:28 PM  Age: 59 Years  Examining Physician: DO MELVIN  Referring Physician: DO MELVIN  Height: 5 feet 6 inch  Weight: 231 lbs  History: EMG LLE  R DOMINANT HAND  DENIES HX OF DMII, THYROID DISEASE, ALCOHOL ABUSE  ADMITS N/T OR WEAKNESS  CPL: 5/10      Sensory NCS      Nerve / Sites Rec. Site Onset Lat Peak Lat NP Amp PP Amp Segments Distance Velocity Comment     ms ms µV µV  cm m/s    L Sural - (Antidromic)      Calf Ankle 2.40 3.07 6.1 7.6 Calf - Ankle 14 58       2 Ankle 2.50 3.07 5.5 15.0           Motor NCS      Nerve / Sites Muscle Latency Amplitude Segments Dist. Lat Diff Velocity Comments     ms mV  cm ms m/s    L Peroneal - EDB      Ankle EDB 4.40 2.5 Ankle - EDB 8         B. Fib Head EDB 11.52 2.3 B. Fib Head - Ankle 30 7.13 42.1       A. Fib Head EDB 13.67 2.2 A. Fib Head - B. Fib Head 10 2.15 46.6    L Tibial - AH      Ankle AH 4.29 5.8 Ankle - AH 8         Knee AH 11.96 4.1 Knee - Ankle 38 7.67 49.6        EMG Summary Table     Spontaneous MUAP Recruitment   Muscle IA Fib PSW Fasc H.F. Amp Dur. PPP Pattern   L. Tibialis anterior 1+ 1+ 1+ None None N N N Reduced   L. Tibialis posterior 1+ 1+ 2+ None None N N N Reduced   L. Gastrocnemius (Medial head) N None None None None N N N N   L. Peroneus longus  2+ 1+ None None N N N Reduced   L. Vastus medialis N None None None None N N N N       Summary    The motor conduction test was normal in all 2 of the tested nerves: L Peroneal - EDB, L Tibial - AH.    The sensory conduction test was normal in all 1 of the tested nerves: L Sural - (Antidromic).    The needle EMG examination was performed in 5 muscles. It was normal in 2 muscle(s): L. Gastrocnemius (Medial head), L. Vastus medialis. The study was abnormal in 3 muscle(s), with the following distribution:  Abnormal  spontaneous/insertional activity was found in L. Tibialis anterior, L. Tibialis posterior, L. Peroneus longus.  Abnormal interference pattern was found in L. Tibialis anterior, L. Tibialis posterior, L. Peroneus longus.        DANA WEBBMIMI [id number] 5/1/2025 3:28 PM     3 of 3    Conclusion:         This is an abnormal study.    1.  There is electrodiagnostic evidence to support a left-sided acute L5 radiculopathy with ongoing denervation changes noted in the L5 musculature of the left lower extremity.  2.  There is no evidence to support a left-sided peroneal or tibial mononeuropathy.  3.  There is no evidence to support a length-dependent peripheral polyneuropathy.    Herve Morel DO  Interventional Spine and Sports Medicine Specialist   Physical Medicine and Rehabilitation  [address] [address]    [facility] [address] [place] [address]      ________________________  DO DANA MOREL [id number] 5/1/2025 3:28 PM     3 of 3

## 2025-05-08 ENCOUNTER — TELEPHONE (OUTPATIENT)
Dept: PHYSICAL MEDICINE AND REHAB | Facility: CLINIC | Age: 60
End: 2025-05-08

## 2025-05-08 ENCOUNTER — OFFICE VISIT (OUTPATIENT)
Dept: SURGERY | Facility: CLINIC | Age: 60
End: 2025-05-08
Payer: COMMERCIAL

## 2025-05-08 VITALS — OXYGEN SATURATION: 95 % | SYSTOLIC BLOOD PRESSURE: 126 MMHG | DIASTOLIC BLOOD PRESSURE: 84 MMHG | HEART RATE: 82 BPM

## 2025-05-08 DIAGNOSIS — Z98.1 S/P LUMBAR FUSION: Primary | ICD-10-CM

## 2025-05-08 DIAGNOSIS — M54.16 LUMBAR RADICULOPATHY: Primary | ICD-10-CM

## 2025-05-08 DIAGNOSIS — M54.16 LEFT LUMBAR RADICULOPATHY: ICD-10-CM

## 2025-05-08 DIAGNOSIS — M79.605 PAIN OF LEFT LOWER EXTREMITY: ICD-10-CM

## 2025-05-08 PROCEDURE — 3074F SYST BP LT 130 MM HG: CPT | Performed by: NEUROLOGICAL SURGERY

## 2025-05-08 PROCEDURE — 99215 OFFICE O/P EST HI 40 MIN: CPT | Performed by: NEUROLOGICAL SURGERY

## 2025-05-08 PROCEDURE — 3079F DIAST BP 80-89 MM HG: CPT | Performed by: NEUROLOGICAL SURGERY

## 2025-05-08 RX ORDER — METHYLPREDNISOLONE 4 MG/1
4 TABLET ORAL AS DIRECTED
COMMUNITY
Start: 2025-03-20

## 2025-05-08 NOTE — PATIENT INSTRUCTIONS
Refill policies:    Allow 2-3 business days for refills; controlled substances may take longer.  Contact your pharmacy at least 5 days prior to running out of medication and have them send an electronic request or submit request through the “request refill” option in your [employer] account.  Refills are not addressed on weekends; covering physicians do not authorize routine medications on weekends.  No narcotics or controlled substances are refilled after noon on Fridays or by on call physicians.  By law, narcotics must be electronically prescribed.  A 30 day supply with no refills is the maximum allowed.  If your prescription is due for a refill, you may be due for a follow up appointment.  To best provide you care, patients receiving routine medications need to be seen at least once a year.  Patients receiving narcotic/controlled substance medications need to be seen at least once every 3 months.  In the event that your preferred pharmacy does not have the requested medication in stock (e.g. Backordered), it is your responsibility to find another pharmacy that has the requested medication available.  We will gladly send a new prescription to that pharmacy at your request.    Scheduling Tests:    If your physician has ordered radiology tests such as MRI or CT scans, please contact Central Scheduling at [phone] right away to schedule the test.  Once scheduled, the [facility] Centralized Referral Team will work with your insurance carrier to obtain pre-certification or prior authorization.  Depending on your insurance carrier, approval may take 3-10 days.  It is highly recommended patients assure they have received an authorization before having a test performed.  If test is done without insurance authorization, patient may be responsible for the entire amount billed.      Precertification and Prior Authorizations:  If your physician has recommended that you have a procedure or additional testing performed the [facility]  Centralized Referral Team will contact your insurance carrier to obtain pre-certification or prior authorization.    You are strongly encouraged to contact your insurance carrier to verify that your procedure/test has been approved and is a COVERED benefit.  Although the [facility] Centralized Referral Team does its due diligence, the insurance carrier gives the disclaimer that \"Although the procedure is authorized, this does not guarantee payment.\"    Ultimately the patient is responsible for payment.   Thank you for your understanding in this matter.  Paperwork Completion:  If you require FMLA or disability paperwork for your recovery, please make sure to either drop it off or have it faxed to our office at [phone]. Be sure the form has your name and date of birth on it.  The form will be faxed to our Forms Department and they will complete it for you.  There is a 25$ fee for all forms that need to be filled out.  Please be aware there is a 10-14 day turnaround time.  You will need to sign a release of information ([employer]) form if your paperwork does not come with one.  You may call the Forms Department with any questions at [phone].  Their fax number is [phone].

## 2025-05-08 NOTE — TELEPHONE ENCOUNTER
Initiated authorization for Left L5 TFESI with fluoroscopic guidance. CPT/HCPCS 52558 dx:M54.16 to be done at [facility] with [facility] portal.    Status: Approved  Reference/Authorization # [id number]  Valid: 05/08/2025 - 06/06/2025  Authorization is not a guarantee of payment and may be subject to review once claim is submitted.

## 2025-05-08 NOTE — PROGRESS NOTES
Patient is here today for Follow up after 3/17/2025 L3-4 Interlaminar epidural steroid Injection w/ Herve Morel DO---10% relief     TESTING:   - 5/1/25 EMG completed    New since last OV: Pt states \"feels like whole back is sore\" having some tingling in left UE

## 2025-05-08 NOTE — PROGRESS NOTES
Neurosurgery Clinic Visit  2025    Marilin Merida PCP:  Viktor Palomares MD    1965 MRN [id number]     HISTORY OF PRESENT ILLNESS:  Marilin Merida is a(n) 59 year old female presents for follow-up  Her main complaint is left leg pain.  Pain goes down the back of her leg down to her toes and more when she is up and around  Her stimulators been off and she only gets a little bit of shocks and pain in her foot and upper shin  This is manageable for her  She like to leave her stimulator off  Her L3 4 injection did not give her much relief  She presents for follow-up  She did get an EMG      PHYSICAL EXAMINATION:  Vital Signs:  /84   Pulse 82   LMP 2019 (Exact Date)   SpO2 95%   Awake alert, orient x 3  Follows commands x 4  Tender over left greater trochanter bursa      REVIEW OF STUDIES:    MRI and CT scans reviewed  EMG showed left-sided L5 radiculopathy      ASSESSMENT and PLAN:  59-year-old female with left hip/greater trochanter and leg pain  Regarding her greater trochanteric pain  I think is reasonable to try another injection  Her last was in October  She doesn't remember how much it really helped  Is was not really discussed  It would be reasonable to reinject that  We offered physical therapy for  She would like to hold off for now  Regarding the pain down her leg  I do think she does have some L5 radiculopathy  I do not think she is symptomatic from her L3-4 stenosis  At this point she has not had injection down there  I recommend either a L5 selective nerve root block or a left L5 transforaminal  She would like to proceed with the transforaminal injection  I will send a note to Dr. Morel  I will see her back after these injections  All questions were answered  Patient appreciative        Time spent on counseling/coordination of care:  30 minutes    Total time spent with patient:  40 minutes    George Martin MD   [facility]  2025  10:28 AM   Dictated  but not proofread

## 2025-05-09 NOTE — TELEPHONE ENCOUNTER
Per Spinal Intervention Reference:  No restrictions for any transforaminal epidural steroid injection with levels of L1-L5.  Patient has been scheduled for Left L5 Transforaminal Epidural Steroid Injection on 5/20/2025 at the [facility] with Dr. Morel.   Anesthesia type:  Local  Please note: The [facility] will call the business day prior to discuss the exact time/arrival and additional instructions for your appointment.  Patient was advised that if he/she does receive the covid vaccine it needs to be at least 2 weeks before or after the injection.  Medications and allergies reviewed.  Patient informed of [facility]'s  policy:  The patient will require transportation arrangements to and from the procedure, with the  present on site for the entire visit.  Without a , the appointment is subject to cancellation.    [facility] is located in the [facility] 1st floor [address] [place] [address].   may park in the yellow/purple parking lot.  Patient verbalized understanding and agrees with plan.  Scheduled in Epic: Yes  Scheduled in Surgical Case: Yes  Follow up appointment made: NOV: Visit date not found  Authorization date valid until 6/6/2025 at [facility].

## 2025-05-19 ENCOUNTER — HOSPITAL ENCOUNTER (OUTPATIENT)
Dept: CT IMAGING | Age: 60
Discharge: HOME | End: 2025-05-19
Attending: FAMILY MEDICINE
Payer: COMMERCIAL

## 2025-05-19 ENCOUNTER — TELEPHONE (OUTPATIENT)
Facility: CLINIC | Age: 60
End: 2025-05-19

## 2025-05-19 DIAGNOSIS — R10.10 PAIN OF UPPER ABDOMEN: ICD-10-CM

## 2025-05-19 LAB
CREAT BLD-MCNC: 1 MG/DL (ref 0.55–1.02)
EGFRCR SERPLBLD CKD-EPI 2021: 66 ML/MIN/1.73M2 (ref 60–?)

## 2025-05-19 PROCEDURE — 74177 CT ABD & PELVIS W/CONTRAST: CPT | Performed by: FAMILY MEDICINE

## 2025-05-19 PROCEDURE — 82565 ASSAY OF CREATININE: CPT

## 2025-05-20 RX ORDER — CYCLOBENZAPRINE HCL 10 MG
10 TABLET ORAL NIGHTLY
Qty: 30 TABLET | Refills: 0 | Status: SHIPPED | OUTPATIENT
Start: 2025-05-20

## 2025-05-20 NOTE — TELEPHONE ENCOUNTER
Refill Request    Medication request:   CYCLOBENZAPRINE 10 MG Oral Tab       LOV: 4/10/2025 Herve Morel DO   RTC: N/A  NOV: Visit date not found      [place]: 4/18/2025 #30 days    UDS: (if applicable): N/A  Pain contract: N/A    LOV plan (if weaning or changing medications): Finally continue as needed muscle relaxer.

## 2025-05-21 ENCOUNTER — OFFICE VISIT (OUTPATIENT)
Dept: PODIATRY CLINIC | Facility: CLINIC | Age: 60
End: 2025-05-21
Payer: COMMERCIAL

## 2025-05-21 DIAGNOSIS — M25.572 ARTHRALGIA OF LEFT ANKLE: ICD-10-CM

## 2025-05-21 DIAGNOSIS — B35.1 ONYCHOMYCOSIS: Primary | ICD-10-CM

## 2025-05-21 PROCEDURE — 99213 OFFICE O/P EST LOW 20 MIN: CPT | Performed by: PODIATRIST
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
## 2025-05-22 ENCOUNTER — OFFICE VISIT (OUTPATIENT)
Facility: CLINIC | Age: 60
End: 2025-05-22
Payer: COMMERCIAL

## 2025-05-22 VITALS — BODY MASS INDEX: 37 KG/M2 | DIASTOLIC BLOOD PRESSURE: 76 MMHG | WEIGHT: 230 LBS | SYSTOLIC BLOOD PRESSURE: 140 MMHG

## 2025-05-22 DIAGNOSIS — T50.905A MEDICATION SIDE EFFECT, INITIAL ENCOUNTER: ICD-10-CM

## 2025-05-22 DIAGNOSIS — Z79.890 HORMONE REPLACEMENT THERAPY (HRT): Primary | ICD-10-CM

## 2025-05-22 PROCEDURE — 99212 OFFICE O/P EST SF 10 MIN: CPT | Performed by: OBSTETRICS & GYNECOLOGY

## 2025-05-22 PROCEDURE — 3078F DIAST BP <80 MM HG: CPT | Performed by: OBSTETRICS & GYNECOLOGY

## 2025-05-22 PROCEDURE — 3077F SYST BP >= 140 MM HG: CPT | Performed by: OBSTETRICS & GYNECOLOGY
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
## 2025-06-02 ENCOUNTER — OFFICE VISIT (OUTPATIENT)
Facility: LOCATION | Age: 60
End: 2025-06-02
Payer: COMMERCIAL

## 2025-06-02 DIAGNOSIS — R10.84 DIFFUSE ABDOMINAL PAIN: ICD-10-CM

## 2025-06-02 DIAGNOSIS — N20.0 KIDNEY STONE: ICD-10-CM

## 2025-06-02 DIAGNOSIS — K59.00 CONSTIPATION, UNSPECIFIED CONSTIPATION TYPE: ICD-10-CM

## 2025-06-02 DIAGNOSIS — N28.89 KIDNEY SCARRING: ICD-10-CM

## 2025-06-02 DIAGNOSIS — R82.90 URINE FINDING: ICD-10-CM

## 2025-06-02 DIAGNOSIS — R10.9 BILATERAL FLANK PAIN: ICD-10-CM

## 2025-06-02 DIAGNOSIS — N32.89 BLADDER WALL THICKENING: Primary | ICD-10-CM

## 2025-06-02 LAB
APPEARANCE: CLEAR
BILIRUBIN: NEGATIVE
GLUCOSE (URINE DIPSTICK): NEGATIVE MG/DL
KETONES (URINE DIPSTICK): NEGATIVE MG/DL
LEUKOCYTES: NEGATIVE
MULTISTIX LOT#: NORMAL NUMERIC
NITRITE, URINE: NEGATIVE
OCCULT BLOOD: NEGATIVE
PH, URINE: 7 (ref 4.5–8)
PROTEIN (URINE DIPSTICK): NEGATIVE MG/DL
SPECIFIC GRAVITY: 1.01 (ref 1–1.03)
URINE-COLOR: YELLOW
UROBILINOGEN,SEMI-QN: 0.2 MG/DL (ref 0–1.9)

## 2025-06-02 PROCEDURE — 81003 URINALYSIS AUTO W/O SCOPE: CPT

## 2025-06-02 PROCEDURE — 99205 OFFICE O/P NEW HI 60 MIN: CPT

## 2025-06-02 RX ORDER — CLINDAMYCIN PHOSPHATE 10 UG/ML
LOTION TOPICAL
COMMUNITY
Start: 2025-05-24
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
## 2025-06-04 ENCOUNTER — TELEPHONE (OUTPATIENT)
Dept: PODIATRY CLINIC | Facility: CLINIC | Age: 60
End: 2025-06-04

## 2025-06-04 NOTE — TELEPHONE ENCOUNTER
Left voicemail for pt, calling w/ culture results from Dr Menchaca, call [phone].  Sent pt [employer] message w/ results.  Will ask provider to review CMP from 5/17 and advise if ok to proceed w/ Lamisil rx.    ----- Message from George Menchaca sent at 5/27/2025  3:13 PM CDT -----  Results reviewed.  Please inform patient that fungal culture results are positive and we should proceed with Lamisil tablet therapy.  If the patient agrees we have to do a hepatic function panel,   please place that order for me with a diagnosis of onychomycosis.  ----- Message -----  From: Lab, Background User  Sent: 5/24/2025   9:00 PM CDT  To: George Menchaca DPM

## 2025-06-05 ENCOUNTER — OFFICE VISIT (OUTPATIENT)
Dept: PHYSICAL MEDICINE AND REHAB | Facility: CLINIC | Age: 60
End: 2025-06-05
Payer: COMMERCIAL

## 2025-06-05 VITALS — BODY MASS INDEX: 36.16 KG/M2 | WEIGHT: 225 LBS | HEIGHT: 66 IN

## 2025-06-05 DIAGNOSIS — M79.605 PAIN OF LEFT LOWER EXTREMITY: ICD-10-CM

## 2025-06-05 DIAGNOSIS — M25.559 GREATER TROCHANTERIC PAIN SYNDROME: Primary | ICD-10-CM

## 2025-06-05 PROCEDURE — 3008F BODY MASS INDEX DOCD: CPT | Performed by: PHYSICAL MEDICINE & REHABILITATION

## 2025-06-05 PROCEDURE — 99213 OFFICE O/P EST LOW 20 MIN: CPT | Performed by: PHYSICAL MEDICINE & REHABILITATION

## 2025-06-05 NOTE — PROGRESS NOTES
RETURN PATIENT VISIT    CHIEF COMPLAINT  Lumbar radicular pain follow-up    INTERVAL HISTORY  Marilin Merida is a 59 year old who was last seen in clinic on follows up on left L5 transforaminal epidural steroid injection performed on 5/20/2025 with 80% relief.  No new symptoms, current level pain is rated 3/10.  History of Present Illness  Marilin Merida is a 59 year old female who presents for follow-up after a left L5 transforaminal nerve block.    She underwent a left L5 transforaminal nerve block targeting the L5 nerve on the left side. The procedure effectively alleviated her leg pain, though she is unsure of the exact onset of relief, estimating it may have taken a day.    She continues to experience a muscle issue, possibly related to the bursa, which was not relieved by the nerve block. Previous physical therapy worsened her symptoms, and she is considering resuming exercises with caution.        REVIEW OF SYSTEMS  Review of systems was completed with the patient today as pertinent to today's visit    PHYSICAL EXAMINATION  CONSTITUTIONAL: Well-appearing, in no apparent distress  EYES: No scleral icterus or conjunctival hemorrhage  CARDIOVASCULAR: Skin warm and well-perfused, no peripheral edema  RESPIRATORY: Breathing unlabored without accessory muscle use  PSYCHIATRIC: Alert, cooperative, appropriate mood and affect  SKIN: No lesions or rashes on exposed skin  MUSCULOSKELETAL:   Physical Exam    She has tenderness palpation over the left greater trochanter and extension to the left gluteal tendons consistent with greater jugular pain syndrome.  Slump sit negative today.  No radicular pain noted on examination.  Stable strength.    REVIEW OF PRIOR X-RAYS/STUDIES  No new imaging to review    IMPRESSION/DIAGNOSIS  1.   Encounter Diagnoses   Name Primary?    Greater trochanteric pain syndrome Yes    Pain of left lower extremity          TREATMENT/PLAN  Assessment & Plan  Left L5 nerve block  Underwent left L5  transforaminal transforaminal epidural steroid injection. Alleviated leg pain but not bursa-related muscle pain. Advised maintaining core and back strength to prolong effects. Consider repeat procedure if symptoms return.  - Advise maintaining core and back strength.  - Consider repeating the TFESI if symptoms return.    Greater trochanteric pain syndrome  Muscle pain potentially related to previous physical therapy. Recommended gentle strengthening exercises. Consider injection if exercises are ineffective or exacerbate pain.  - Recommend gentle strengthening exercises for the gluteal muscles.  - Consider an injection if exercises exacerbate the pain or are ineffective.      Education was provided regarding the above impression/diagnosis and treatment options/plan were discussed.  All questions were answered during today's visit.  Patient will contact clinic if any other questions or concerns.          Herve Morel DO  Interventional Spine and Sports Medicine Specialist   Physical Medicine and Rehabilitation  [address] [address]    [address] [place] [address]

## 2025-06-09 RX ORDER — TERBINAFINE HYDROCHLORIDE 250 MG/1
250 TABLET ORAL DAILY
Qty: 45 TABLET | Refills: 0 | Status: SHIPPED | OUTPATIENT
Start: 2025-06-09

## 2025-06-09 NOTE — TELEPHONE ENCOUNTER
Please let the patient know that I have prescribed the Lamisil tablets to her pharmacy and she needs to make an appointment for 6 weeks for evaluation of the base of the nail and repeat hepatic function panel.

## 2025-06-12 ENCOUNTER — OFFICE VISIT (OUTPATIENT)
Dept: PHYSICAL MEDICINE AND REHAB | Facility: CLINIC | Age: 60
End: 2025-06-12
Payer: COMMERCIAL

## 2025-06-12 ENCOUNTER — TELEPHONE (OUTPATIENT)
Dept: PHYSICAL MEDICINE AND REHAB | Facility: CLINIC | Age: 60
End: 2025-06-12

## 2025-06-12 DIAGNOSIS — M25.559 GREATER TROCHANTERIC PAIN SYNDROME: Primary | ICD-10-CM

## 2025-06-12 PROCEDURE — 20611 DRAIN/INJ JOINT/BURSA W/US: CPT | Performed by: PHYSICAL MEDICINE & REHABILITATION

## 2025-06-12 RX ORDER — LIDOCAINE HYDROCHLORIDE 10 MG/ML
9 INJECTION, SOLUTION INFILTRATION; PERINEURAL ONCE
Status: COMPLETED | OUTPATIENT
Start: 2025-06-12 | End: 2025-06-12

## 2025-06-12 RX ORDER — TRIAMCINOLONE ACETONIDE 40 MG/ML
40 INJECTION, SUSPENSION INTRA-ARTICULAR; INTRAMUSCULAR ONCE
Status: COMPLETED | OUTPATIENT
Start: 2025-06-12 | End: 2025-06-12

## 2025-06-12 NOTE — TELEPHONE ENCOUNTER
Initiated authorization for Left greater trochanteric bursa injection, ultrasound guidance. CPT/HCPCS 33730,  dx:M25.559 with [employer] portal.  Completed in the office today.    Status: No action required  Reference/Authorization # [id number]    Authorization is not required based on medical necessity, however, is not a guarantee of payment and may be subject to review once claim is submitted.

## 2025-06-12 NOTE — PROCEDURES
PROCEDURE NOTE    DIAGNOSIS  Left greater trochanteric bursitis/pain syndrome    PROCEDURE  Ultrasound guided Left greater trochanteric bursa injection    PERFORMING PHYSICIAN  Herve Morel DO    PROCEDURE NOTE  Informed consent was obtained. Risks and benefits of the procedure were explained. The patient was placed in a sidelying position and the Left greater trochanter was identified under ultrasound using the curvilinear transducer. The area of maximal tenderness was identified. The area was prepped with Betadine x 3 and alcohol swab. Sterile ultrasound gel was applied. A 27-gauge 1.5 inch needle was inserted into this area and 1-2 mL of 1% lidocaine was infused subcutaneously to anesthetize the region. Then, a 22-gauge 3.5 inch needle was advanced under ultrasound guidance to the target, using an in-plane approach. Then, 1 mL of 40 mg/mL Triamcinolone, 3 mL of 1% Lidocaine was infused. The patient tolerated the procedure without complications. Post procedure instructions were provided.        Herve Morel DO  Physical Medicine and Rehabilitation / Sports Medicine   [facility]

## 2025-06-17 ENCOUNTER — TELEPHONE (OUTPATIENT)
Dept: PODIATRY CLINIC | Facility: CLINIC | Age: 60
End: 2025-06-17

## 2025-07-01 ENCOUNTER — OFFICE VISIT (OUTPATIENT)
Facility: LOCATION | Age: 60
End: 2025-07-01
Payer: COMMERCIAL

## 2025-07-01 DIAGNOSIS — H93.293 ABNORMAL AUDITORY PERCEPTION OF BOTH EARS: Primary | ICD-10-CM

## 2025-07-01 DIAGNOSIS — H93.13 TINNITUS OF BOTH EARS: Primary | ICD-10-CM

## 2025-07-01 DIAGNOSIS — J34.2 DEVIATED SEPTUM: ICD-10-CM

## 2025-07-01 PROCEDURE — 99203 OFFICE O/P NEW LOW 30 MIN: CPT | Performed by: OTOLARYNGOLOGY

## 2025-07-01 PROCEDURE — 92567 TYMPANOMETRY: CPT | Performed by: AUDIOLOGIST

## 2025-07-01 PROCEDURE — 92557 COMPREHENSIVE HEARING TEST: CPT | Performed by: AUDIOLOGIST

## 2025-07-01 RX ORDER — FLUTICASONE PROPIONATE 50 MCG
2 SPRAY, SUSPENSION (ML) NASAL DAILY
Qty: 16 G | Refills: 3 | Status: SHIPPED | OUTPATIENT
Start: 2025-07-01
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
## 2025-07-01 NOTE — PROGRESS NOTES
Marilin Merida was seen for an audiometric evaluation and tympanogram today. Referred back to physician.    Reina Roe M.A. MILEA

## 2025-07-08 ENCOUNTER — TELEPHONE (OUTPATIENT)
Dept: SURGERY | Facility: CLINIC | Age: 60
End: 2025-07-08

## 2025-07-08 ENCOUNTER — OFFICE VISIT (OUTPATIENT)
Dept: SURGERY | Facility: CLINIC | Age: 60
End: 2025-07-08
Payer: COMMERCIAL

## 2025-07-08 VITALS — DIASTOLIC BLOOD PRESSURE: 78 MMHG | SYSTOLIC BLOOD PRESSURE: 114 MMHG | HEART RATE: 82 BPM

## 2025-07-08 DIAGNOSIS — Z96.89 S/P INSERTION OF SPINAL CORD STIMULATOR: Primary | ICD-10-CM

## 2025-07-08 DIAGNOSIS — Z98.1 HISTORY OF LUMBAR FUSION: ICD-10-CM

## 2025-07-08 DIAGNOSIS — M54.16 LEFT LUMBAR RADICULOPATHY: ICD-10-CM

## 2025-07-08 DIAGNOSIS — Z96.89 S/P INSERTION OF SPINAL CORD STIMULATOR: ICD-10-CM

## 2025-07-08 DIAGNOSIS — Z98.1 S/P LUMBAR FUSION: Primary | ICD-10-CM

## 2025-07-08 DIAGNOSIS — Z01.812 ENCOUNTER FOR PRE-OPERATIVE LABORATORY TESTING: ICD-10-CM

## 2025-07-08 PROCEDURE — 99213 OFFICE O/P EST LOW 20 MIN: CPT | Performed by: NEUROLOGICAL SURGERY

## 2025-07-08 PROCEDURE — 3078F DIAST BP <80 MM HG: CPT | Performed by: NEUROLOGICAL SURGERY

## 2025-07-08 PROCEDURE — 3074F SYST BP LT 130 MM HG: CPT | Performed by: NEUROLOGICAL SURGERY

## 2025-07-08 NOTE — PATIENT INSTRUCTIONS
Refill policies:    Allow 2-3 business days for refills; controlled substances may take longer.  Contact your pharmacy at least 5 days prior to running out of medication and have them send an electronic request or submit request through the “request refill” option in your [employer] account.  Refills are not addressed on weekends; covering physicians do not authorize routine medications on weekends.  No narcotics or controlled substances are refilled after noon on Fridays or by on call physicians.  By law, narcotics must be electronically prescribed.  A 30 day supply with no refills is the maximum allowed.  If your prescription is due for a refill, you may be due for a follow up appointment.  To best provide you care, patients receiving routine medications need to be seen at least once a year.  Patients receiving narcotic/controlled substance medications need to be seen at least once every 3 months.  In the event that your preferred pharmacy does not have the requested medication in stock (e.g. Backordered), it is your responsibility to find another pharmacy that has the requested medication available.  We will gladly send a new prescription to that pharmacy at your request.    Scheduling Tests:    If your physician has ordered radiology tests such as MRI or CT scans, please contact Central Scheduling at [phone] right away to schedule the test.  Once scheduled, the [facility] Centralized Referral Team will work with your insurance carrier to obtain pre-certification or prior authorization.  Depending on your insurance carrier, approval may take 3-10 days.  It is highly recommended patients assure they have received an authorization before having a test performed.  If test is done without insurance authorization, patient may be responsible for the entire amount billed.      Precertification and Prior Authorizations:  If your physician has recommended that you have a procedure or additional testing performed the [facility]  Centralized Referral Team will contact your insurance carrier to obtain pre-certification or prior authorization.    You are strongly encouraged to contact your insurance carrier to verify that your procedure/test has been approved and is a COVERED benefit.  Although the [facility] Centralized Referral Team does its due diligence, the insurance carrier gives the disclaimer that \"Although the procedure is authorized, this does not guarantee payment.\"    Ultimately the patient is responsible for payment.   Thank you for your understanding in this matter.  Paperwork Completion:  If you require FMLA or disability paperwork for your recovery, please make sure to either drop it off or have it faxed to our office at [phone]. Be sure the form has your name and date of birth on it.  The form will be faxed to our Forms Department and they will complete it for you.  There is a 25$ fee for all forms that need to be filled out.  Please be aware there is a 10-14 day turnaround time.  You will need to sign a release of information (ROOSEVELT) form if your paperwork does not come with one.  You may call the Forms Department with any questions at [phone].  Their fax number is [phone].     You are scheduled for a Spinal Cord Stimulator Implant Removal on 8.4.25 with Dr. Martin at [facility]    PCP clearance is required.  We have faxed a letter to Dr Palomares notifying them that you will be having surgery and need clearance.  Please call their office to schedule an appointment. Please have this appointment completed at least 1 week prior to your surgery so there is enough time to see other specialists, if necessary     You will also need to have blood work  The blood work should be done within two weeks of surgery. Please call [phone] to schedule an appointment to have these done.     No blood thinning medications, over the counter non-steroidal antiinflammatories, herbal supplements (garlic,tumeric etc.), vitamin E,  fish oil or krill oil for 7 days prior to surgery. No phentermine/diet aid medications, sexual enhancement medications for 7 days prior to surgery. No Monoamine oxidase inhibitors (MAOIs) for 2 weeks prior to surgery (rasagiline (Azilect), selegiline (Eldepryl, Zelapar), isocarboxazid (Marplan), phenelzine (Nardil), and tranylcypromine (Parnate).     You may only take Tylenol, Extra Strength Tylenol, Arthritis Tylenol, or prescription Norco or Tramadol for pain if something is needed.     You should have nothing to eat or drink after midnight the night prior to surgery EXCEPT GATORADE     You will need to drink 12 ounces of regular Gatorade (NOT RED) 12 hours and 4 hours prior to your scheduled surgery time.  This will help you recover from anesthesia better. Do not drink any other liquids (including water) before your surgery. Do not chew gum or eat candies before surgery.       Take 1000 mg of Tylenol (Acetaminophen) 4 hours before your scheduled surgery time, take this with your scheduled Gatorade. This will help with post op pain.      Our office will get authorization for surgery with your insurance.      Surgery is scheduled as an outpatient procedure. You will need someone to drive you home.     If you were on blood thinners (such as Coumadin, Pradaxa, Xarelto, etc) prior to surgery that we had you stop for surgery, please make sure you get instructions about when to resume the medication before you are discharged.     Post operative appointments to be made 2 and 6 weeks after surgery.       Your 2 week post-op appointment is on 8.19.25 at 9.15am with CHRISS Hale     Your 6 week post-op appointment is on 9.16.25 at 9 am with Dr Martin     In order to prevent post-operative infections you will need to use a pre-operative soap called Hibiclens.  You will shower with this every day for the 5 days leading up to surgery.  The last shower should be the night before surgery.   See additional instructions  below.   Hibiclens Bathing  Hibiclens is a body soap that is used before surgery protect you from getting an infection after surgery   Hibiclens comes in a large blue bottle and can be found in most pharmacies in the First Aid supplies  Shower with this daily for FIVE consecutive days before surgery, using the entire bottle over the five days.  The last shower should be the night before surgery.   Steps to bathing with Hibiclens  Do not use Hibiclens on your hair, face or private areas  Wash your hair and face as normal with your usual cleansers  Rinse well  Using a clean wet washcloth apply enough Hibiclens to cover your body. Wash from the neck down avoiding the genital areas and concentrating on the surgical area  Rinse well  Dry yourself with a clean, dry towel  Do not use any powders, creams, lotions or sprays on your body as these attract bacteria  Deodorant and facial creams are acceptable.   (Laundering/cleaning: Chlorhexidine gluconate skin cleansers will cause stains if used with chlorine releasing products. Rinse completely and use only non-chlorine detergents.)        REFILLS:  After surgery, please remember that we do have a 48 hour refill policy that does not include weekends, please make sure to request your medications in a timely manner so that you do not go days without medication.  *Refills should be requested through your pharmacy or through the refill request in [employer] (log in, go to medications, then select refill request).     Mismi MESSAGING:  Please remember that our office is closed during the weekend and no one is available for [employer] messages. If you have an urgent or emergent matter please go to a walk-in center or the emergency room. Also please remember your [employer] messages are part of your legal medical record and should not be utilized as a personal email with our providers as it is visible to all [facility] employees. Also, Since [employer] messages are not for emergent  matters it may be several days before there is a response to your message.     FMLA/PAPERWORK:  If you require FMLA paperwork for your surgery, please make sure to either Drop it off or have it faxed to our office at [phone]. Make sure it has your NAME, , and has your signature. If your signature is not on the form you will need to have a Release of Information on file. To facilitate this process we ask that you requested it at the  on your way out and sign it. Without a signed ROOSEVELT or signature on the form we will not be able to fax it and this will cause a delay with your forms.  **We do have a 2 week policy for all forms and paperwork, please make sure to allow plenty of time for completion. Same day paperwork will not be completed. **     **Message will also be sent to your pain management office to inform them of your surgery date as your pain management doctor will need to manage your post-op pain medications.     Please visit the following website for the detailed and up-to-date visitor screening and restriction policy at [email or web address] [email or web address].           For Office Use Only:     Medical Clearance Needed: pcp  PA: nadine lynn  CPT Codes:

## 2025-07-08 NOTE — PROGRESS NOTES
Neurosurgery Clinic Visit  2025    Marilin Merida PCP:  Viktor Palomares MD    1965 MRN [id number]     HISTORY OF PRESENT ILLNESS:  Marilin Merida is a(n) 60 year old female presents for follow-up  Her burst injection helped the most  She did get some relief from the L5 injection  But it was more the bursa injection  She would would like to discuss removing her spinal cord stimulator  She is not using it and would like it out      PHYSICAL EXAMINATION:  Vital Signs:  /78   Pulse 82   LMP 2019 (Exact Date)   Awake alert, orient x 3  Follows commands x 4        REVIEW OF STUDIES:    Previous CT chest and pelvis are reviewed shows the stimulator      ASSESSMENT and PLAN:  60-year-old female with some radiculopathy as well as bursa pain  She would like her stimulator out  It has been off and she does not feel like it is really helping her  I am okay with taking it out  Recent vents were discussed in detail which include not limited to bleeding, infection, CSF leak, temporary or permanent neurologic deficit  She wishes to proceed  She would like the whole thing out  If after do extra work including laminotomies she is willing to have that done  Plan surgery in the near future  All questions were answered  Patient appreciative        Time spent on counseling/coordination of care:  30 Minutes    Total time spent with patient:  30 Minutes      George Martin MD   [facility]  2025  10:02 AM   Dictated but not proofread

## 2025-07-08 NOTE — PROGRESS NOTES
Patient is here for left hip/greater trochanter and leg pain follow up after injections      TREATMENT:   - 6/12/2025 Left greater trochanteric bursa injection w/ Herve Morel, DO with 95% relief with ongoing benefit as of this visit    - pt would like to discuss removal of SCS- states currently not working

## 2025-07-16 ENCOUNTER — LAB ENCOUNTER (OUTPATIENT)
Dept: LAB | Age: 60
End: 2025-07-16
Attending: NEUROLOGICAL SURGERY
Payer: COMMERCIAL

## 2025-07-16 ENCOUNTER — EKG ENCOUNTER (OUTPATIENT)
Dept: LAB | Age: 60
End: 2025-07-16
Attending: FAMILY MEDICINE
Payer: COMMERCIAL

## 2025-07-16 DIAGNOSIS — Z01.818 PREOP EXAMINATION: Primary | ICD-10-CM

## 2025-07-16 DIAGNOSIS — Z96.89 S/P INSERTION OF SPINAL CORD STIMULATOR: ICD-10-CM

## 2025-07-16 DIAGNOSIS — Z01.812 ENCOUNTER FOR PRE-OPERATIVE LABORATORY TESTING: ICD-10-CM

## 2025-07-16 LAB
ANION GAP SERPL CALC-SCNC: 5 MMOL/L (ref 0–18)
APTT PPP: 27.1 SECONDS (ref 23–36)
ATRIAL RATE: 67 BPM
BASOPHILS # BLD AUTO: 0.06 X10(3) UL (ref 0–0.2)
BASOPHILS NFR BLD AUTO: 0.9 %
BUN BLD-MCNC: 10 MG/DL (ref 9–23)
CALCIUM BLD-MCNC: 9.6 MG/DL (ref 8.7–10.6)
CHLORIDE SERPL-SCNC: 105 MMOL/L (ref 98–112)
CO2 SERPL-SCNC: 32 MMOL/L (ref 21–32)
CREAT BLD-MCNC: 1.09 MG/DL (ref 0.55–1.02)
EGFRCR SERPLBLD CKD-EPI 2021: 59 ML/MIN/1.73M2 (ref 60–?)
EOSINOPHIL # BLD AUTO: 0.11 X10(3) UL (ref 0–0.7)
EOSINOPHIL NFR BLD AUTO: 1.6 %
ERYTHROCYTE [DISTWIDTH] IN BLOOD BY AUTOMATED COUNT: 13.8 %
FASTING STATUS PATIENT QL REPORTED: NO
GLUCOSE BLD-MCNC: 96 MG/DL (ref 70–99)
HCT VFR BLD AUTO: 42.6 % (ref 35–48)
HGB BLD-MCNC: 14.6 G/DL (ref 12–16)
IMM GRANULOCYTES # BLD AUTO: 0.02 X10(3) UL (ref 0–1)
IMM GRANULOCYTES NFR BLD: 0.3 %
INR BLD: 0.97 (ref 0.8–1.2)
LYMPHOCYTES # BLD AUTO: 1.8 X10(3) UL (ref 1–4)
LYMPHOCYTES NFR BLD AUTO: 26.2 %
MCH RBC QN AUTO: 30.5 PG (ref 26–34)
MCHC RBC AUTO-ENTMCNC: 34.3 G/DL (ref 31–37)
MCV RBC AUTO: 88.9 FL (ref 80–100)
MONOCYTES # BLD AUTO: 0.51 X10(3) UL (ref 0.1–1)
MONOCYTES NFR BLD AUTO: 7.4 %
NEUTROPHILS # BLD AUTO: 4.38 X10 (3) UL (ref 1.5–7.7)
NEUTROPHILS # BLD AUTO: 4.38 X10(3) UL (ref 1.5–7.7)
NEUTROPHILS NFR BLD AUTO: 63.6 %
OSMOLALITY SERPL CALC.SUM OF ELEC: 293 MOSM/KG (ref 275–295)
P AXIS: 69 DEGREES
P-R INTERVAL: 138 MS
PLATELET # BLD AUTO: 271 10(3)UL (ref 150–450)
POTASSIUM SERPL-SCNC: 4.3 MMOL/L (ref 3.5–5.1)
PROTHROMBIN TIME: 12.9 SECONDS (ref 11.6–14.8)
Q-T INTERVAL: 404 MS
QRS DURATION: 76 MS
QTC CALCULATION (BEZET): 426 MS
R AXIS: 17 DEGREES
RBC # BLD AUTO: 4.79 X10(6)UL (ref 3.8–5.3)
SODIUM SERPL-SCNC: 142 MMOL/L (ref 136–145)
T AXIS: 69 DEGREES
VENTRICULAR RATE: 67 BPM
WBC # BLD AUTO: 6.9 X10(3) UL (ref 4–11)

## 2025-07-16 PROCEDURE — 85730 THROMBOPLASTIN TIME PARTIAL: CPT | Performed by: NEUROLOGICAL SURGERY

## 2025-07-16 PROCEDURE — 36415 COLL VENOUS BLD VENIPUNCTURE: CPT | Performed by: NEUROLOGICAL SURGERY

## 2025-07-16 PROCEDURE — 85610 PROTHROMBIN TIME: CPT | Performed by: NEUROLOGICAL SURGERY

## 2025-07-16 PROCEDURE — 93010 ELECTROCARDIOGRAM REPORT: CPT | Performed by: INTERNAL MEDICINE

## 2025-07-16 PROCEDURE — 87081 CULTURE SCREEN ONLY: CPT

## 2025-07-16 PROCEDURE — 93005 ELECTROCARDIOGRAM TRACING: CPT

## 2025-07-16 PROCEDURE — 85025 COMPLETE CBC W/AUTO DIFF WBC: CPT | Performed by: NEUROLOGICAL SURGERY

## 2025-07-16 PROCEDURE — 80048 BASIC METABOLIC PNL TOTAL CA: CPT | Performed by: NEUROLOGICAL SURGERY

## 2025-07-19 ENCOUNTER — HOSPITAL ENCOUNTER (OUTPATIENT)
Dept: GENERAL RADIOLOGY | Age: 60
Discharge: HOME | End: 2025-07-19
Attending: NEUROLOGICAL SURGERY
Payer: COMMERCIAL

## 2025-07-19 DIAGNOSIS — Z01.818 PRE-OP TESTING: ICD-10-CM

## 2025-07-19 PROCEDURE — 71046 X-RAY EXAM CHEST 2 VIEWS: CPT | Performed by: NEUROLOGICAL SURGERY

## 2025-08-04 ENCOUNTER — APPOINTMENT (OUTPATIENT)
Dept: GENERAL RADIOLOGY | Facility: HOSPITAL | Age: 60
End: 2025-08-04
Attending: NEUROLOGICAL SURGERY

## 2025-08-04 ENCOUNTER — HOSPITAL ENCOUNTER (OUTPATIENT)
Facility: HOSPITAL | Age: 60
Setting detail: HOSPITAL OUTPATIENT SURGERY
Discharge: HOME | End: 2025-08-04
Attending: NEUROLOGICAL SURGERY | Admitting: NEUROLOGICAL SURGERY

## 2025-08-04 ENCOUNTER — ANESTHESIA (OUTPATIENT)
Dept: SURGERY | Facility: HOSPITAL | Age: 60
End: 2025-08-04

## 2025-08-04 ENCOUNTER — ANESTHESIA EVENT (OUTPATIENT)
Dept: SURGERY | Facility: HOSPITAL | Age: 60
End: 2025-08-04

## 2025-08-04 VITALS
RESPIRATION RATE: 18 BRPM | BODY MASS INDEX: 36 KG/M2 | HEART RATE: 58 BPM | DIASTOLIC BLOOD PRESSURE: 87 MMHG | WEIGHT: 224 LBS | HEIGHT: 66 IN | TEMPERATURE: 97 F | OXYGEN SATURATION: 100 % | SYSTOLIC BLOOD PRESSURE: 104 MMHG

## 2025-08-04 DIAGNOSIS — Z98.890 POST-OPERATIVE STATE: ICD-10-CM

## 2025-08-04 DIAGNOSIS — Z01.818 PRE-OP TESTING: Primary | ICD-10-CM

## 2025-08-04 PROBLEM — T85.192A MALFUNCTION OF SPINAL CORD STIMULATOR: Status: ACTIVE | Noted: 2025-08-04

## 2025-08-04 PROCEDURE — 63688 REV/RMV IMP SP NPG/R DTCH CN: CPT | Performed by: NEUROLOGICAL SURGERY

## 2025-08-04 PROCEDURE — 63661 REMOVE SPINE ELTRD PERQ ARAY: CPT | Performed by: NEUROLOGICAL SURGERY

## 2025-08-04 PROCEDURE — 76000 FLUOROSCOPY <1 HR PHYS/QHP: CPT | Performed by: NEUROLOGICAL SURGERY

## 2025-08-04 RX ORDER — CLINDAMYCIN HYDROCHLORIDE 300 MG/1
300 CAPSULE ORAL 3 TIMES DAILY
Qty: 9 CAPSULE | Refills: 0 | Status: SHIPPED | OUTPATIENT
Start: 2025-08-04 | End: 2025-08-07

## 2025-08-04 RX ORDER — NEOSTIGMINE METHYLSULFATE 1 MG/ML
INJECTION INTRAVENOUS AS NEEDED
Status: DISCONTINUED | OUTPATIENT
Start: 2025-08-04 | End: 2025-08-04 | Stop reason: SURG

## 2025-08-04 RX ORDER — VANCOMYCIN HYDROCHLORIDE
15 ONCE
Status: DISCONTINUED | OUTPATIENT
Start: 2025-08-04 | End: 2025-08-04 | Stop reason: HOSPADM

## 2025-08-04 RX ORDER — MEPERIDINE HYDROCHLORIDE 25 MG/ML
12.5 INJECTION INTRAMUSCULAR; INTRAVENOUS; SUBCUTANEOUS
Status: DISCONTINUED | OUTPATIENT
Start: 2025-08-04 | End: 2025-08-04

## 2025-08-04 RX ORDER — LIDOCAINE HYDROCHLORIDE 10 MG/ML
INJECTION, SOLUTION EPIDURAL; INFILTRATION; INTRACAUDAL; PERINEURAL AS NEEDED
Status: DISCONTINUED | OUTPATIENT
Start: 2025-08-04 | End: 2025-08-04 | Stop reason: SURG

## 2025-08-04 RX ORDER — ONDANSETRON 2 MG/ML
4 INJECTION INTRAMUSCULAR; INTRAVENOUS EVERY 6 HOURS PRN
Status: DISCONTINUED | OUTPATIENT
Start: 2025-08-04 | End: 2025-08-04

## 2025-08-04 RX ORDER — PROCHLORPERAZINE EDISYLATE 5 MG/ML
5 INJECTION INTRAMUSCULAR; INTRAVENOUS EVERY 8 HOURS PRN
Status: DISCONTINUED | OUTPATIENT
Start: 2025-08-04 | End: 2025-08-04

## 2025-08-04 RX ORDER — ACETAMINOPHEN 500 MG
1000 TABLET ORAL ONCE
Status: DISCONTINUED | OUTPATIENT
Start: 2025-08-04 | End: 2025-08-04 | Stop reason: HOSPADM

## 2025-08-04 RX ORDER — HYDROCODONE BITARTRATE AND ACETAMINOPHEN 5; 325 MG/1; MG/1
1 TABLET ORAL ONCE AS NEEDED
Status: DISCONTINUED | OUTPATIENT
Start: 2025-08-04 | End: 2025-08-04

## 2025-08-04 RX ORDER — KETOROLAC TROMETHAMINE 30 MG/ML
INJECTION, SOLUTION INTRAMUSCULAR; INTRAVENOUS AS NEEDED
Status: DISCONTINUED | OUTPATIENT
Start: 2025-08-04 | End: 2025-08-04 | Stop reason: SURG

## 2025-08-04 RX ORDER — DEXAMETHASONE SODIUM PHOSPHATE 4 MG/ML
VIAL (ML) INJECTION AS NEEDED
Status: DISCONTINUED | OUTPATIENT
Start: 2025-08-04 | End: 2025-08-04 | Stop reason: SURG

## 2025-08-04 RX ORDER — BUPIVACAINE HYDROCHLORIDE AND EPINEPHRINE 2.5; 5 MG/ML; UG/ML
INJECTION, SOLUTION EPIDURAL; INFILTRATION; INTRACAUDAL; PERINEURAL AS NEEDED
Status: DISCONTINUED | OUTPATIENT
Start: 2025-08-04 | End: 2025-08-04 | Stop reason: HOSPADM

## 2025-08-04 RX ORDER — SCOPOLAMINE 1 MG/3D
1 PATCH, EXTENDED RELEASE TRANSDERMAL ONCE
Status: DISCONTINUED | OUTPATIENT
Start: 2025-08-04 | End: 2025-08-04 | Stop reason: HOSPADM

## 2025-08-04 RX ORDER — SODIUM CHLORIDE, SODIUM LACTATE, POTASSIUM CHLORIDE, CALCIUM CHLORIDE 600; 310; 30; 20 MG/100ML; MG/100ML; MG/100ML; MG/100ML
INJECTION, SOLUTION INTRAVENOUS CONTINUOUS
Status: DISCONTINUED | OUTPATIENT
Start: 2025-08-04 | End: 2025-08-04

## 2025-08-04 RX ORDER — HYDROMORPHONE HYDROCHLORIDE 1 MG/ML
0.6 INJECTION, SOLUTION INTRAMUSCULAR; INTRAVENOUS; SUBCUTANEOUS EVERY 5 MIN PRN
Status: DISCONTINUED | OUTPATIENT
Start: 2025-08-04 | End: 2025-08-04

## 2025-08-04 RX ORDER — NALOXONE HYDROCHLORIDE 0.4 MG/ML
0.08 INJECTION, SOLUTION INTRAMUSCULAR; INTRAVENOUS; SUBCUTANEOUS AS NEEDED
Status: DISCONTINUED | OUTPATIENT
Start: 2025-08-04 | End: 2025-08-04

## 2025-08-04 RX ORDER — ROCURONIUM BROMIDE 10 MG/ML
INJECTION, SOLUTION INTRAVENOUS AS NEEDED
Status: DISCONTINUED | OUTPATIENT
Start: 2025-08-04 | End: 2025-08-04 | Stop reason: SURG

## 2025-08-04 RX ORDER — MIDAZOLAM HYDROCHLORIDE 1 MG/ML
1 INJECTION INTRAMUSCULAR; INTRAVENOUS EVERY 5 MIN PRN
Status: DISCONTINUED | OUTPATIENT
Start: 2025-08-04 | End: 2025-08-04

## 2025-08-04 RX ORDER — OXYCODONE HYDROCHLORIDE 5 MG/1
5 TABLET ORAL EVERY 4 HOURS PRN
Qty: 40 TABLET | Refills: 0 | Status: SHIPPED | OUTPATIENT
Start: 2025-08-04 | End: 2025-08-19

## 2025-08-04 RX ORDER — HYDROMORPHONE HYDROCHLORIDE 1 MG/ML
0.4 INJECTION, SOLUTION INTRAMUSCULAR; INTRAVENOUS; SUBCUTANEOUS EVERY 5 MIN PRN
Status: DISCONTINUED | OUTPATIENT
Start: 2025-08-04 | End: 2025-08-04

## 2025-08-04 RX ORDER — ONDANSETRON 2 MG/ML
INJECTION INTRAMUSCULAR; INTRAVENOUS AS NEEDED
Status: DISCONTINUED | OUTPATIENT
Start: 2025-08-04 | End: 2025-08-04 | Stop reason: SURG

## 2025-08-04 RX ORDER — GLYCOPYRROLATE 0.2 MG/ML
INJECTION, SOLUTION INTRAMUSCULAR; INTRAVENOUS AS NEEDED
Status: DISCONTINUED | OUTPATIENT
Start: 2025-08-04 | End: 2025-08-04 | Stop reason: SURG

## 2025-08-04 RX ORDER — HYDROCODONE BITARTRATE AND ACETAMINOPHEN 5; 325 MG/1; MG/1
2 TABLET ORAL ONCE AS NEEDED
Status: DISCONTINUED | OUTPATIENT
Start: 2025-08-04 | End: 2025-08-04

## 2025-08-04 RX ORDER — ACETAMINOPHEN 500 MG
TABLET ORAL EVERY 6 HOURS PRN
Qty: 60 TABLET | Refills: 0 | Status: SHIPPED | OUTPATIENT
Start: 2025-08-04

## 2025-08-04 RX ORDER — HYDROMORPHONE HYDROCHLORIDE 1 MG/ML
0.2 INJECTION, SOLUTION INTRAMUSCULAR; INTRAVENOUS; SUBCUTANEOUS EVERY 5 MIN PRN
Status: DISCONTINUED | OUTPATIENT
Start: 2025-08-04 | End: 2025-08-04

## 2025-08-04 RX ORDER — ACETAMINOPHEN 500 MG
1000 TABLET ORAL ONCE AS NEEDED
Status: DISCONTINUED | OUTPATIENT
Start: 2025-08-04 | End: 2025-08-04

## 2025-08-04 RX ORDER — METOCLOPRAMIDE HYDROCHLORIDE 5 MG/ML
INJECTION INTRAMUSCULAR; INTRAVENOUS AS NEEDED
Status: DISCONTINUED | OUTPATIENT
Start: 2025-08-04 | End: 2025-08-04 | Stop reason: SURG

## 2025-08-04 RX ADMIN — METOCLOPRAMIDE HYDROCHLORIDE 10 MG: 5 INJECTION INTRAMUSCULAR; INTRAVENOUS at 08:48:00

## 2025-08-04 RX ADMIN — LIDOCAINE HYDROCHLORIDE 50 MG: 10 INJECTION, SOLUTION EPIDURAL; INFILTRATION; INTRACAUDAL; PERINEURAL at 08:44:00

## 2025-08-04 RX ADMIN — DEXAMETHASONE SODIUM PHOSPHATE 8 MG: 4 MG/ML VIAL (ML) INJECTION at 08:48:00

## 2025-08-04 RX ADMIN — SODIUM CHLORIDE, SODIUM LACTATE, POTASSIUM CHLORIDE, CALCIUM CHLORIDE: 600; 310; 30; 20 INJECTION, SOLUTION INTRAVENOUS at 09:29:00

## 2025-08-04 RX ADMIN — GLYCOPYRROLATE 0.8 MG: 0.2 INJECTION, SOLUTION INTRAMUSCULAR; INTRAVENOUS at 09:47:00

## 2025-08-04 RX ADMIN — ROCURONIUM BROMIDE 50 MG: 10 INJECTION, SOLUTION INTRAVENOUS at 08:44:00

## 2025-08-04 RX ADMIN — ONDANSETRON 4 MG: 2 INJECTION INTRAMUSCULAR; INTRAVENOUS at 09:19:00

## 2025-08-04 RX ADMIN — KETOROLAC TROMETHAMINE 30 MG: 30 INJECTION, SOLUTION INTRAMUSCULAR; INTRAVENOUS at 09:19:00

## 2025-08-04 RX ADMIN — SODIUM CHLORIDE, SODIUM LACTATE, POTASSIUM CHLORIDE, CALCIUM CHLORIDE: 600; 310; 30; 20 INJECTION, SOLUTION INTRAVENOUS at 08:40:00

## 2025-08-04 RX ADMIN — NEOSTIGMINE METHYLSULFATE 4 MG: 1 INJECTION INTRAVENOUS at 09:47:00

## 2025-08-05 RX ORDER — CYCLOBENZAPRINE HCL 10 MG
10 TABLET ORAL NIGHTLY
Qty: 30 TABLET | Refills: 0 | Status: SHIPPED | OUTPATIENT
Start: 2025-08-05

## 2025-08-07 ENCOUNTER — TELEPHONE (OUTPATIENT)
Dept: SURGERY | Facility: CLINIC | Age: 60
End: 2025-08-07

## 2025-08-19 ENCOUNTER — OFFICE VISIT (OUTPATIENT)
Dept: SURGERY | Facility: CLINIC | Age: 60
End: 2025-08-19

## 2025-08-19 VITALS
WEIGHT: 225 LBS | SYSTOLIC BLOOD PRESSURE: 110 MMHG | HEIGHT: 66 IN | BODY MASS INDEX: 36.16 KG/M2 | DIASTOLIC BLOOD PRESSURE: 82 MMHG | HEART RATE: 60 BPM

## 2025-08-19 DIAGNOSIS — Z98.890 POST-OPERATIVE STATE: Primary | ICD-10-CM

## 2025-08-19 PROCEDURE — 3008F BODY MASS INDEX DOCD: CPT | Performed by: PHYSICIAN ASSISTANT

## 2025-08-19 PROCEDURE — 3079F DIAST BP 80-89 MM HG: CPT | Performed by: PHYSICIAN ASSISTANT

## 2025-08-19 PROCEDURE — 3074F SYST BP LT 130 MM HG: CPT | Performed by: PHYSICIAN ASSISTANT

## 2025-08-19 PROCEDURE — 99024 POSTOP FOLLOW-UP VISIT: CPT | Performed by: PHYSICIAN ASSISTANT

## 2025-08-20 ENCOUNTER — TELEPHONE (OUTPATIENT)
Dept: PHYSICAL MEDICINE AND REHAB | Facility: CLINIC | Age: 60
End: 2025-08-20

## (undated) DIAGNOSIS — Z98.890 S/P CARPAL TUNNEL RELEASE: ICD-10-CM

## (undated) DIAGNOSIS — D48.9 NEOPLASM OF UNCERTAIN BEHAVIOR, UNSPECIFIED: ICD-10-CM

## (undated) DIAGNOSIS — B35.1 ONYCHOMYCOSIS: Primary | ICD-10-CM

## (undated) DEVICE — 3M(TM) MEDIPORE(TM) +PAD SOFT CLOTH ADHESIVE WOUND DRESSING 3569: Brand: 3M™ MEDIPORE™

## (undated) DEVICE — DRAPE HALF 40X58 DYNJP2410

## (undated) DEVICE — DRAIN SILICONE FLAT 10MM

## (undated) DEVICE — 3.0MM PRECISION NEURO (MATCH HEAD)

## (undated) DEVICE — PAD SACRAL SPAN AID

## (undated) DEVICE — ACCESSORY KIT

## (undated) DEVICE — GLOVE SURG SENSICARE SZ 6-1/2

## (undated) DEVICE — 10FT COMBINED O2 DELIVERY/CO2 MONITORING. FILTER WITH MICROSTREAM TYPE LUER: Brand: DUAL ADULT NASAL CANNULA

## (undated) DEVICE — UPPER EXTREMITY CDS-LF: Brand: MEDLINE INDUSTRIES, INC.

## (undated) DEVICE — Device

## (undated) DEVICE — MARKER SKIN PREP RESIST STRL

## (undated) DEVICE — GLOVE SUR 7 SENSICARE PI PIP CRM PWD F

## (undated) DEVICE — 3M™ IOBAN™ 2 ANTIMICROBIAL INCISE DRAPE 6650EZ: Brand: IOBAN™ 2

## (undated) DEVICE — SYRINGE MED 10ML LL TIP W/O SFTY DISP

## (undated) DEVICE — LIGHT HANDLE

## (undated) DEVICE — SYRINGE 10ML LL CONTRL SYRINGE

## (undated) DEVICE — TOWEL OR BLU 16X26 STRL

## (undated) DEVICE — DRAPE MICROSCOPE NEURO PENTERO

## (undated) DEVICE — LAMINECTOMY CDS: Brand: MEDLINE INDUSTRIES, INC.

## (undated) DEVICE — MARKER PRE-SURGICAL MINI DISP

## (undated) DEVICE — ALCOHOL 70% 4 OZ

## (undated) DEVICE — PAIN TRAY: Brand: MEDLINE INDUSTRIES, INC.

## (undated) DEVICE — DRAPE C-ARM UNIVERSAL

## (undated) DEVICE — SHEET, T, LAPAROTOMY, STERILE: Brand: MEDLINE

## (undated) DEVICE — GAUZE,SPONGE,4"X4",12PLY,STERILE,LF,2'S: Brand: MEDLINE

## (undated) DEVICE — YANKAUER,FLEXIBLE HANDLE,FINE CAPACITY: Brand: MEDLINE

## (undated) DEVICE — GLOVE SURG SENSICARE SZ 7-1/2

## (undated) DEVICE — SOLUTION SURG DURA PREP HAZMAT

## (undated) DEVICE — NEEDLE SPINAL 22X5 405148

## (undated) DEVICE — GLOVE SUR 7.5 SENSICARE PI PIP GRN PWD F

## (undated) DEVICE — SUTURE VICRYL 0 CP-1

## (undated) DEVICE — STERILE POLYISOPRENE POWDER-FREE SURGICAL GLOVES: Brand: PROTEXIS

## (undated) DEVICE — EXTERNAL NEUROSTIMULATOR BOOT

## (undated) DEVICE — SUTURE ETHILON 3-0 FS-1

## (undated) DEVICE — SPONGE: SPECIALTY PEANUT XR 100/CS: Brand: MEDICAL ACTION INDUSTRIES

## (undated) DEVICE — PACK LAMINECTOMY

## (undated) DEVICE — DRILL SRG OIL CRTDG MAESTRO

## (undated) DEVICE — GLOVE SURG SENSICARE SZ 6

## (undated) DEVICE — ADHESIVE SKIN TOP FOR WND CLSR DERMBND ADV

## (undated) DEVICE — SURGIFLO

## (undated) DEVICE — SOL  .9 1000ML BTL

## (undated) DEVICE — 3M(TM) TEGADERM(TM) TRANSPARENT FILM DRESSING FRAME STYLE 1628: Brand: 3M™ TEGADERM™

## (undated) DEVICE — WIRELESS EXTERNAL NEUROSTIMULATOR

## (undated) DEVICE — SOLUTION DURAPREP 26ML APPLICATOR

## (undated) DEVICE — KIT VLV 5 PC AIR H2O SUCT BX ENDOGATOR CONN

## (undated) DEVICE — 3M™ TEGADERM™ TRANSPARENT FILM DRESSING, 1626W, 4 IN X 4-3/4 IN (10 CM X 12 CM), 50 EACH/CARTON, 4 CARTON/CASE: Brand: 3M™ TEGADERM™

## (undated) DEVICE — SUT VCRL 0 18IN CT-1 ABSRB VLT CR L36MM 1/2

## (undated) DEVICE — KENDALL SCD EXPRESS SLEEVES, KNEE LENGTH, MEDIUM: Brand: KENDALL SCD

## (undated) DEVICE — EAGLE PLUS ANTERIOR CERVICAL PLATE SYSTEM FIXED DEPTH DRILL - AO 14MM: Brand: EAGLE

## (undated) DEVICE — GLOVE SURG TRIUMPH SZ 8

## (undated) DEVICE — SUTURE VICRYL 3-0 RB-1

## (undated) DEVICE — BANDAGE ROLL,100% COTTON, 6 PLY, LARGE: Brand: KERLIX

## (undated) DEVICE — GOWN SUR 2XL LEV 4 BLU W/ WHT V NK BND AERO

## (undated) DEVICE — PREMIUM WET SKIN PREP TRAY: Brand: MEDLINE INDUSTRIES, INC.

## (undated) DEVICE — FLOSEAL SEALENT STERILE 10ML

## (undated) DEVICE — DRAPE,THYROID,SOFT,STERILE: Brand: MEDLINE

## (undated) DEVICE — COVER,TABLE,HVY DUTY,EXT,77"X96",STRL: Brand: MEDLINE

## (undated) DEVICE — BANDAID COVERLET 1X3

## (undated) DEVICE — DRAPE TABLE COVER 44X90 TC-10

## (undated) DEVICE — CATHETER IV 14GA L5.25IN ANGIOCATH STR FEP

## (undated) DEVICE — 1200CC GUARDIAN II: Brand: GUARDIAN

## (undated) DEVICE — SLEEVE COMPR MD KNEE LEN SGL USE KENDALL SCD

## (undated) DEVICE — TRANSPOSAL ULTRAFLEX DUO/QUAD ULTRA CART MANIFOLD

## (undated) DEVICE — MINI LAP PACK-LF: Brand: MEDLINE INDUSTRIES, INC.

## (undated) DEVICE — DRAIN RELIAVAC 100CC

## (undated) DEVICE — SUPER SPONGES,MEDIUM: Brand: KERLIX

## (undated) DEVICE — SCRUB EZ BETADINE 245

## (undated) DEVICE — #15 STERILE STAINLESS BLADE: Brand: STERILE STAINLESS BLADES

## (undated) DEVICE — NEEDLE SPINAL 22X3-1/2 BLK

## (undated) DEVICE — GOWN,SIRUS,FABRIC-REINFORCED,LARGE: Brand: MEDLINE

## (undated) DEVICE — INJEX BUMPY ANCHOR

## (undated) DEVICE — SUTURE VICRYL 0 CT-1

## (undated) DEVICE — KENDALL SCD EXPRESS SLEEVES, THIGH LENGTH, MEDIUM: Brand: KENDALL SCD

## (undated) DEVICE — GIJAW SINGLE-USE BIOPSY FORCEPS WITH NEEDLE: Brand: GIJAW

## (undated) DEVICE — GLOVE BIOGEL M SURG SZ 71/2

## (undated) DEVICE — BITEBLOCK ENDOSCP 60FR MAXI STRP

## (undated) DEVICE — SUTURE MONOCRYL 4-0 PS-2

## (undated) DEVICE — SYRINGE MED 20ML STD CLR PLAS LL TIP N CTRL

## (undated) DEVICE — SUTURE SILK 2-0 FSL

## (undated) DEVICE — SUTURE VICRYL 2-0 CP-2

## (undated) DEVICE — UNDYED BRAIDED (POLYGLACTIN 910), SYNTHETIC ABSORBABLE SUTURE: Brand: COATED VICRYL

## (undated) DEVICE — DRILL NEURO SOFT TOUCH 3.0X3.8

## (undated) DEVICE — ANTISEPTIC 4OZ 70% ISO ALC

## (undated) DEVICE — PROXIMATE RH ROTATING HEAD SKIN STAPLERS (35 WIDE) CONTAINS 35 STAINLESS STEEL STAPLES: Brand: PROXIMATE

## (undated) DEVICE — SCREW EAGLE+SWIFT PRM ST 14
Type: IMPLANTABLE DEVICE | Site: BACK | Status: NON-FUNCTIONAL
Removed: 2017-07-11

## (undated) DEVICE — THE MILL DISPOSABLE - FINE

## (undated) DEVICE — GOWN,SIRUS,FABRIC-REINFORCED,X-LARGE: Brand: MEDLINE

## (undated) DEVICE — CAUTERY PENCIL

## (undated) DEVICE — COVER LT HNDL RIG FOR SUR CAM DISP

## (undated) DEVICE — GAUZE SPONGES,8 PLY: Brand: CURITY

## (undated) DEVICE — NON-ADHERENT PAD PREPACK: Brand: TELFA

## (undated) DEVICE — DISTRACTION SCREW: Brand: MAYFIELD®

## (undated) DEVICE — CAUTERY BLADE 2IN INS E1455

## (undated) DEVICE — OCCLUSIVE GAUZE STRIP OVERWRAP,3% BISMUTH TRIBROMOPHENATE IN PETROLATUM BLEND: Brand: XEROFORM

## (undated) DEVICE — REMOVER DURAPREP 3M

## (undated) DEVICE — SUT PERMA- 0 18IN CT-1 NABSRB BLK CR L36MM

## (undated) DEVICE — GAMMEX® PI HYBRID SIZE 7.5, STERILE POWDER-FREE SURGICAL GLOVE, POLYISOPRENE AND NEOPRENE BLEND: Brand: GAMMEX

## (undated) DEVICE — 5.0MM X 7.0MM FLUTED DRUM

## (undated) DEVICE — SUTURE ETHILON 3-0 PS-2

## (undated) DEVICE — TAPE CLOTH ADHESIVE 3

## (undated) DEVICE — SOLUTION ANSEP 70% ISOPRPNL

## (undated) DEVICE — 3M™ RED DOT™ MONITORING ELECTRODE WITH FOAM TAPE AND STICKY GEL, 50/BAG, 20/CASE, 72/PLT 2570: Brand: RED DOT™

## (undated) DEVICE — 3M(TM) TEGADERM(TM) TRANSPARENT FILM DRESSING FRAME STYLE 9505W: Brand: 3M™ TEGADERM™

## (undated) DEVICE — SUT COAT VCRL 2-0 18IN CT-1 ABSRB UD CR 36MM

## (undated) DEVICE — CASED DISP BIPOLAR CORD

## (undated) DEVICE — SUT COAT VCRL+ 3-0 18IN RB-1 ABSRB VLT ANTIBA

## (undated) DEVICE — SOLUTION IRRIG 1000ML 0.9% NACL USP BTL

## (undated) NOTE — MR AVS SNAPSHOT
[id number]  [phone]               Thank you for choosing us for your health care visit with Hira Campbell, PT. We are glad to serve you and happy to provide you with this summary of your visit.   Please the building. For security purposes, please check in with the reception staff at every visit.                                           Jun 06, 2017  4:15 PM   PT VISIT BY THERAPIST with Ramiro Knapp PT   THE [facility] in [address] [place] [address]  (BRADLYW If you've recently had a stay at the Hospital you can access your discharge instructions in [employer] by going to Visits < Admission Summaries.  If you've been to the Emergency Department or your doctor's office, you can view your past visit information in My

## (undated) NOTE — LETTER
[facility]  Sreedhar Gauthier [id number] [facility], [address] [place] [address]    Consent for Operation    Date: __________________    Time: _______________    1.  I authorize the performance upon Pineda Navarrete the following operation:    Procedure(s):  lumbar five-sacral o procedure has been videotaped, the surgeon will obtain the original videotape. The hospital will not be responsible for storage or maintenance of this tape.     6. For the purpose of advancing medical education, I consent to the admittance of observers to t STATEMENTS REQUIRING INSERTION OR COMPLETION WERE FILLED IN.     Signature of Patient:   ___________________________    When the patient is a minor or mentally incompetent to give consent:  Signature of person authorized to consent for patient: ____________ drugs/illegal medications). Failure to inform my anesthesiologist about these medicines may increase my risk of anesthetic complications. · If I am allergic to anything or have had a reaction to anesthesia before.     3. I understand how the anesthesia med I have discussed the procedure and information above with the patient (or patient’s representative) and answered their questions. The patient or their representative has agreed to have anesthesia services.     _______________________________________________

## (undated) NOTE — LETTER
AUTHORIZATION FOR SURGICAL OPERATION OR OTHER PROCEDURE    1. I hereby authorize Dr. Herve Morel and the [facility] staff assigned to my case to perform the following operation and/or procedure at the [facility]:    Left greater trochanteric bursa injection, ultrasound guidance, local anesthesia     2.  My physician has explained the nature and purpose of the operation or other procedure, possible alternative methods of treatment, the risks involved, and the possibility of complication to me.  I acknowledge that no guarantee has been made as to the result that may be obtained.  3.  I recognize that, during the course of this operation, or other procedure, unforseen conditions may necessitate additional or different procedure than those listed above.  I, therefore, further authorize and request that the above named physician, his/her physician assistants or designees perform such procedures as are, in his/her professional opinion, necessary and desirable.  4.  Any tissue or organs removed in the operation or other procedure may be disposed of by and at the discretion of the [facility] staff and [facility].  5.  I understand that in the event of a medical emergency, I will be transported by local paramedics to [facility] or other hospital emergency department.  6.  I certify that I have read and fully understand the above consent to operation and/or other procedure.    7.  I acknowledge that my physician has explained sedation/analgesia administration to me including the risks and benefits.  I consent to the administration of sedation/analgesia as may be necessary or desirable in the judgement of my physician.    Witness signature: ___________________________________________________ Date:  ______/______/_____                    Time:  ________ A.M.  P.M.       Patient Name:  Marilin Merida  6/13/1965  [id number]       Patient signature:   ___________________________________________________                   Statement of Physician  My signature below affirms that prior to the time of the procedure, I have explained to the patient and/or his/her guardian, the risks and benefits involved in the proposed treatment and any reasonable alternative to the proposed treatment.  I have also explained the risks and benefits involved in the refusal of the proposed treatment and have answered the patient's questions.                        Date:  ______/______/_______  Provider                      Signature:  __________________________________________________________       Time:  ___________ ANYLA ZHOU

## (undated) NOTE — LETTER
[facility]  Aidaneriberto Sanchezsharon 63 [address] [facility], [address] [place] [address]    Consent for Operation    Date: __________________    Time: _______________    1.  I authorize the performance upon [facility] the following operation:    Procedure(s):  lumbar five-sacral o procedure has been videotaped, the surgeon will obtain the original videotape. The hospital will not be responsible for storage or maintenance of this tape.     6. For the purpose of advancing medical education, I consent to the admittance of observers to t STATEMENTS REQUIRING INSERTION OR COMPLETION WERE FILLED IN.     Signature of Patient:   ___________________________    When the patient is a minor or mentally incompetent to give consent:  Signature of person authorized to consent for patient: ____________ drugs/illegal medications). Failure to inform my anesthesiologist about these medicines may increase my risk of anesthetic complications. · If I am allergic to anything or have had a reaction to anesthesia before.     3. I understand how the anesthesia med I have discussed the procedure and information above with the patient (or patient’s representative) and answered their questions. The patient or their representative has agreed to have anesthesia services.     _______________________________________________

## (undated) NOTE — LETTER
3/26/2018      RE: Danilo Kovacs     : 1965    Dear Dr. Branham Pro,    This letter is to inform you that your patient is being scheduled for surgery with Dr. Faraz Forman on 18 at [facility]. We have asked the patient to contact your office t

## (undated) NOTE — LETTER
8/4/2020        Chance Merida  6/13/1965        To Whom it may concern: This letter has been written at the patient's request. The above patient has been seen at the [facility] for treatment of a medical condition.     This patie

## (undated) NOTE — Clinical Note
Patient Name: Marina Matson  YOB: 1965          MRN number:  [id number]  Date:  4/8/2017  Referring Physician:  Braulio Aaron         SPINE EVALUATION:    Referring Physician: Dr. Ramona Davis    Diagnosis: Lumbar hyperlordotic posture syndrome James Maria would benefit from skilled Physical Therapy to address the above impairments to her lumbar spine and surrounding tissues. Precautions:  standard  OBJECTIVE:   Observation/Posture: Transfers and gait w/o guarding. No lumbar shift.scolosis.  Hyperl difficulty. -Improve limbo pelvic strength and flexibility  to WFL's so pt can perform bed, chair, car transfers with little pain or difficulty.   -Independent with progressive HEP.      Frequency / Duration: Patient will be seen for 1-2 x/week or a total

## (undated) NOTE — LETTER
Date: 2025      Patient Name: Marilin Merida      : 1965        Thank you for choosing [facility] as your health care provider. Your physician has deemed the following medical service(s) necessary. However, your insurance plan may not pay for all of your health care and costs and may deny payment for this service. The fact that your insurance plan does not pay for an item or service does not mean you should not receive it. The purpose of this form is to help you make an informed decision about whether or not you want to receive this service(s) that may not be paid for by your insurance plan.    CPT Code Description     Cost     Left greater trochanteric bursa injection, ultrasound guidance, local anesthesia      I understand that the above mentioned service(s) or supply may not be covered by my insurance company. I agree to be financially responsible for the cost of this service or supply in the event of my insurance denies payment as a non-covered benefit.        ______________________________________________________________________  Signature of Patient or Patient's Representative  Relationship  Date    ______________________________________________________________________  Signature of Witness to signing of form   Printed Name

## (undated) NOTE — LETTER
2017      Patient: Lilia Ga  : [birth year]        To Whom it may concern: This letter has been written at the patient's request. The above patient is being seen at the [facility] for the treatment of a medical condition.  Pa

## (undated) NOTE — MR AVS SNAPSHOT
MIR[address]  [phone]               Thank you for choosing us for your health care visit with Hira Campbell, RORY. We are glad to serve you and happy to provide you with this summary of your visit.   Please the building. For security purposes, please check in with the reception staff at every visit.                                           Apr 18, 2017  4:15 PM   PT VISIT BY THERAPIST with Jayleen Aguilar PT   THE [facility] Physical Therapy in [address] [place] [address] Dr DUGANW Current Medications          This list is accurate as of: 4/4/17 11:59 PM.  Always use your most recent med list.                Atorvastatin Calcium 10 MG Tabs   Commonly known as:  LIPITOR           Fexofenadine HCl 180 MG Tabs   Take 180 mg by mouth rosalia

## (undated) NOTE — LETTER
Patient Name: Kei Grossman  YOB: 1965          MRN number:  [id number]  Date:  10/18/2017  Referring Physician:  Marcella Saunders           Dx:   s/p ACSF C4 to C7 on 7/11/2017.                Subjective:   Noris Phelps reports she remains with spo Goals: All goals met     Improve cervical rotation AROM to WFL's so pt can safely operate car w/o restrictions or difficulty.  met  -Improve cervical extension AROM to WFL's so pt can look up into kitchen cabinets when retrieving food products with little

## (undated) NOTE — Clinical Note
Hamzah Vallejo, Can we try a left L5 transforaminal to see how much relief she gets from this.  Also I think her bursa is bothering her as well and we get a new bursa injection on her left as well.  I will see her back after she gets those injections.  I do not think her L3-4 stenosis is too symptomatic at the moment.  She has her stimulator off.  She would like to see we get her L5 radiculopathy treated without any surgical intervention. Thanks, Sheldon

## (undated) NOTE — MR AVS SNAPSHOT
MIRIAM Lauren Torrez [address]44  [phone]               Thank you for choosing us for your health care visit with Hira Campbell, PT. We are glad to serve you and happy to provide you with this summary of your visit.   Please the building. For security purposes, please check in with the reception staff at every visit.                                           Jun 01, 2017  4:15 PM   PT VISIT BY THERAPIST with Elli Bashir PT   THE [facility] Physical Therapy in [address] [place] [address]  (BRADLYW atorvastatin 10 MG Tabs   Commonly known as:  LIPITOR           azithromycin 250 MG Tabs   Commonly known as:  ZITHROMAX           Fexofenadine HCl 180 MG Tabs   Take 180 mg by mouth daily.    Commonly known as:  ALLEGRA           FLUoxetine HCl 10 MG Tabs

## (undated) NOTE — LETTER
2017      Patient: Deana Reyes  : [birth year]        To Whom it may concern: This letter has been written at the patient's request. The above patient is being seen at the [facility] for the treatment of a medical condition.  Kavita

## (undated) NOTE — LETTER
06/07/19      To Whom It May Concern:    Lior Matthews was seen in our office on 6/7/2019. Work status is as follows:    ? Off work until June 27, 2019  ? Off work, temporary total disability  ?    May return to work part time status, no restrictions

## (undated) NOTE — LETTER
AUTHORIZATION FOR SURGICAL OPERATION OR OTHER PROCEDURE    1. I hereby authorize Dr. Herve Morel and the [facility] staff assigned to my case to perform the following operation and/or procedure at the [facility]:    Left greater trochanteric bursa injection, ultrasound guidance, local anesthesia    2.  My physician has explained the nature and purpose of the operation or other procedure, possible alternative methods of treatment, the risks involved, and the possibility of complication to me.  I acknowledge that no guarantee has been made as to the result that may be obtained.  3.  I recognize that, during the course of this operation, or other procedure, unforseen conditions may necessitate additional or different procedure than those listed above.  I, therefore, further authorize and request that the above named physician, his/her physician assistants or designees perform such procedures as are, in his/her professional opinion, necessary and desirable.  4.  Any tissue or organs removed in the operation or other procedure may be disposed of by and at the discretion of the [facility] staff and [facility].  5.  I understand that in the event of a medical emergency, I will be transported by local paramedics to [facility] or other hospital emergency department.  6.  I certify that I have read and fully understand the above consent to operation and/or other procedure.    7.  I acknowledge that my physician has explained sedation/analgesia administration to me including the risks and benefits.  I consent to the administration of sedation/analgesia as may be necessary or desirable in the judgement of my physician.    Witness signature: ___________________________________________________ Date:  ______/______/_____                    Time:  ________ A.M.  P.M.       Patient Name:  Marilin Merida  6/13/1965  [id number]         Patient signature:   ___________________________________________________                 Statement of Physician  My signature below affirms that prior to the time of the procedure, I have explained to the patient and/or his/her guardian, the risks and benefits involved in the proposed treatment and any reasonable alternative to the proposed treatment.  I have also explained the risks and benefits involved in the refusal of the proposed treatment and have answered the patient's questions.                        Date:  ______/______/_______  Provider                      Signature:  __________________________________________________________       Time:  ___________ ANYLA ZHOU

## (undated) NOTE — LETTER
[facility]  Sreedhar Danielsharon 61 [phone] [facility], [address] [place] [address]    Consent for Operation    Date: __________________    Time: _______________    1.  I authorize the performance upon Gayathri Salazar the following operation:    Procedure(s):  lumbar five-sacral o procedure has been videotaped, the surgeon will obtain the original videotape. The hospital will not be responsible for storage or maintenance of this tape.     6. For the purpose of advancing medical education, I consent to the admittance of observers to t STATEMENTS REQUIRING INSERTION OR COMPLETION WERE FILLED IN.     Signature of Patient:   ___________________________    When the patient is a minor or mentally incompetent to give consent:  Signature of person authorized to consent for patient: ____________ drugs/illegal medications). Failure to inform my anesthesiologist about these medicines may increase my risk of anesthetic complications. · If I am allergic to anything or have had a reaction to anesthesia before.     3. I understand how the anesthesia med I have discussed the procedure and information above with the patient (or patient’s representative) and answered their questions. The patient or their representative has agreed to have anesthesia services.     _______________________________________________

## (undated) NOTE — LETTER
Date: 5/21/2018    Patient Name: Pineda Navarrete          To Whom it may concern: This letter has been written at the patient's request. The above patient was seen at the [facility] for treatment of a medical condition.     This patient cleopatra

## (undated) NOTE — LETTER
Date: 2024      Patient Name: Marilin Merida      : 1965        Thank you for choosing [facility] as your health care provider. Your physician has deemed the following medical service(s) necessary. However, your insurance plan may not pay for all of your health care and costs and may deny payment for this service. The fact that your insurance plan does not pay for an item or service does not mean you should not receive it. The purpose of this form is to help you make an informed decision about whether or not you want to receive this service(s) that may not be paid for by your insurance plan.    CPT Code Description     Cost     Left greater trochanteric bursa injection, ultrasound guidance, local anesthesia     _________ ______________________________ _____________      _________ ______________________________ _____________      I understand that the above mentioned service(s) or supply may not be covered by my insurance company. I agree to be financially responsible for the cost of this service or supply in the event of my insurance denies payment as a non-covered benefit.        ______________________________________________________________________  Signature of Patient or Patient's Representative  Relationship  Date    ______________________________________________________________________  Signature of Witness to signing of form   Printed Name

## (undated) NOTE — LETTER
01/08/18    Dr. Emma Millan,     I had the pleasure of seeing your patient, Deana Reyes, in clinic. 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  I have recommended that she continue to progress toward stopping smoking

## (undated) NOTE — MR AVS SNAPSHOT
MIRIAM Lauren [address]rez [address]  [phone]               Thank you for choosing us for your health care visit with Hira Campbell, PT. We are glad to serve you and happy to provide you with this summary of your visit.   Please the building. For security purposes, please check in with the reception staff at every visit.                                           May 25, 2017  4:15 PM   PT VISIT BY THERAPIST with Marlon Tello PT   THE [facility] Physical Therapy in [address] [place] [address]  (BRADLYW inside the [employer], at [facility] (enter via [facility]). Convenient parking is available in the parking lot in front of the [facility].   When you enter the [facility], please check in with the Visit [facility] online at  [email or web address]

## (undated) NOTE — LETTER
06/18/18        To Whom It May Concern:    Ryan Casey was seen in our office on 6/18/2018. Work status is as follows:    ? Off work until re-evaluated at next appointment on ____________  ? Off work, temporary total disability  ?    May return to

## (undated) NOTE — LETTER
[facility]  Sreedhar Gauthier 61 [address] [facility], [address] [place] [address]    Consent for Operation    Date: __________________    Time: _______________    1.  I authorize the performance upon Meredith Daniel the following operation:    Procedure(s):  lumbar five-sacral o procedure has been videotaped, the surgeon will obtain the original videotape. The hospital will not be responsible for storage or maintenance of this tape.     6. For the purpose of advancing medical education, I consent to the admittance of observers to t STATEMENTS REQUIRING INSERTION OR COMPLETION WERE FILLED IN.     Signature of Patient:   ___________________________    When the patient is a minor or mentally incompetent to give consent:  Signature of person authorized to consent for patient: ____________ drugs/illegal medications). Failure to inform my anesthesiologist about these medicines may increase my risk of anesthetic complications. · If I am allergic to anything or have had a reaction to anesthesia before.     3. I understand how the anesthesia med I have discussed the procedure and information above with the patient (or patient’s representative) and answered their questions. The patient or their representative has agreed to have anesthesia services.     _______________________________________________

## (undated) NOTE — LETTER
Date: 6/1/2018    Patient Name: Derrick Hummel          To Whom it may concern: This letter has been written at the patient's request. The above patient was seen at the [facility] for treatment of a medical condition.     This patient cleopatrau

## (undated) NOTE — LETTER
20    Damion Merida   1965    RE: Guerita Nilópez     : 1965    Dear Dr. Mor Rubin,    This letter is to inform you that your patient is being scheduled for surgery with Dr. Amelia Aguirre on 20 at [facility] AT [facility].

## (undated) NOTE — LETTER
Patient Name: Som Goodman  YOB: 1965          MRN number:  [id number]  Date:  8/1/2019  Referring Physician:  Anthony Waltoner    Discharge Summary  Initial Functional Outcome Score 60/100  Final Functional Outcome Score 71/100  Number of Vis to No information on file. .   I certify the need for these services furnished under this plan of treatment and while under my care.     X___________________________________________________ Date____________________    Certification From: 0/6/1032  To:10/30/2

## (undated) NOTE — LETTER
Patient Name: Kei Grossman  YOB: 1965          MRN number:  [id number]  Date:  7/24/2018  Referring Physician:  Vineet Alcocer          SPINE EVALUATION:    Referring Physician: Vineet BANERJEE  Diagnosis:  s/p L5-S1 revision decompression and fus Precautions:  surgical  OBJECTIVE:   Observation/Posture: anterior pelvic tilt, increased lumbar lordosis. No guarding with transfers or gait, direction changes. Pt emotional, upset with continuation of symptoms.     Neuro Screen: Neural tension tests negat visits over a 90 day period. Treatment will include: Manual Therapy; Therapeutic Exercises; Neuromuscular Re-education; Therapeutic Activity; Pt education; Home exercise program instruction.     Education or treatment limitation: chronicity of symptoms w/o

## (undated) NOTE — LETTER
Patient Name: Lynda Harden  YOB: 1965          MRN number:  [id number]  Date:  7/24/2018  Referring Physician:  Radha Hodges          SPINE EVALUATION:    Referring Physician: Radha BANERJEE  Diagnosis:  s/p L5-S1 revision decompression and fus OBJECTIVE:   Observation/Posture: anterior pelvic tilt, increased lumbar lordosis. No guarding with transfers or gait, direction changes. Pt emotional, upset with continuation of symptoms.     Neuro Screen: Neural tension tests negative, DTR's at LE's intac *** visits over a 90 day period. Treatment will include: Manual Therapy; Therapeutic Exercises; Neuromuscular Re-education; Therapeutic Activity;  Electrical Stim; Mechanical Traction; Pt education; Home exercise program instruction; ***    Education or sravanthi

## (undated) NOTE — LETTER
06/07/19      To Whom It May Concern:    Lynda Harden was seen in our office on 6/7/2019. Work status is as follows:    X   Off work until June 27, 2019  ? Off work, temporary total disability  ?    May return to work part time status, no restrictio

## (undated) NOTE — Clinical Note
Dr. Riley Daily, this patient is scheduled for an SCS trial on 4/18/19. Would you be ok to proceed if she has a carpal tunnel release on 4/9/19 or do we need to reschedule?

## (undated) NOTE — Clinical Note
2017      RE: Guerita Álvarez     : 1965    Dear Dr. Mor Rubin,    This letter is to inform you that your patient is being scheduled for surgery with Dr. Ria Almonte on 17 (there is a possibility patient may be scheduled sooner) at [facility]

## (undated) NOTE — LETTER
11/27/17    Dr. Damaris Mcaiel,     I had the pleasure of seeing your patient, Mora Flores, in clinic. She has chronic back and left leg pain.  I have recommended that, in addition to imaging and pain control, she see you to discuss a plan for quitting smoking and

## (undated) NOTE — MR AVS SNAPSHOT
MIRIAM Lauren Torrez [address]  [phone]               Thank you for choosing us for your health care visit with Hira Campbell PT. We are glad to serve you and happy to provide you with this summary of your visit.   Please the building. For security purposes, please check in with the reception staff at every visit.                                           Apr 18, 2017  4:15 PM   PT VISIT BY THERAPIST with Marina Carcamo PT   THE [facility] Physical Therapy in [address] [place] [address] Dr DUGANW Atorvastatin Calcium 10 MG Tabs   Commonly known as:  LIPITOR           Fexofenadine HCl 180 MG Tabs   Take 180 mg by mouth daily. Commonly known as:  ALLEGRA           PARoxetine HCl 20 MG Tabs   Take 1 tablet (20 mg total) by mouth every morning.    Co

## (undated) NOTE — LETTER
[address]  [address]  Authorization for Surgical Operation and Procedure     Date:___________                                                                                                         Time:__________  I hereby authorize Surgeon(s):  Reddy Clemens MD, my physician and his/her assistants (if applicable), which may include medical students, residents, and/or fellows, to perform the following surgical operation/ procedure and administer such anesthesia as may be determined necessary by my physician:  Operation/Procedure name (s) Procedure(s):  RIGHT CHEST CYST EXCISION X TWO on Marilin Merida   2.   I recognize that during the surgical operation/procedure, unforeseen conditions may necessitate additional or different procedures than those listed above.  I, therefore, further authorize and request that the above-named surgeon, assistants, or designees perform such procedures as are, in their judgment, necessary and desirable.    3.   My surgeon/physician has discussed prior to my surgery the potential benefits, risks and side effects of this procedure; the likelihood of achieving goals; and potential problems that might occur during recuperation.  They also discussed reasonable alternatives to the procedure, including risks, benefits, and side effects related to the alternatives and risks related to not receiving this procedure.  I have had all my questions answered and I acknowledge that no guarantee has been made as to the result that may be obtained.    4.   Should the need arise during my operation/procedure, which includes change of level of care prior to discharge, I also consent to the administration of blood and/or blood products.  Further, I understand that despite careful testing and screening of blood or blood products by collecting agencies, I may still be subject to ill effects as a result of receiving a blood transfusion and/or blood products.  The  following are some, but not all, of the potential risks that can occur: fever and allergic reactions, hemolytic reactions, transmission of diseases such as Hepatitis, AIDS and Cytomegalovirus (CMV) and fluid overload.  In the event that I wish to have an autologous transfusion of my own blood, or a directed donor transfusion, I will discuss this with my physician.  Check only if Refusing Blood or Blood Products  I understand refusal of blood or blood products as deemed necessary by my physician may have serious consequences to my condition to include possible death. I hereby assume responsibility for my refusal and release the hospital, its personnel, and my physicians from any responsibility for the consequences of my refusal.          o  Refuse      5.   I authorize the use of any specimen, organs, tissues, body parts or foreign objects that may be removed from my body during the operation/procedure for diagnosis, research or teaching purposes and their subsequent disposal by hospital authorities.  I also authorize the release of specimen test results and/or written reports to my treating physician on the hospital medical staff or other referring or consulting physicians involved in my care, at the discretion of the Pathologist or my treating physician.    6.   I consent to the photographing or videotaping of the operations or procedures to be performed, including appropriate portions of my body for medical, scientific, or educational purposes, provided my identity is not revealed by the pictures or by descriptive texts accompanying them.  If the procedure has been photographed/videotaped, the surgeon will obtain the original picture, image, videotape or CD.  The hospital will not be responsible for storage, release or maintenance of the picture, image, tape or CD.    7.   I consent to the presence of a  or observers in the operating room as deemed necessary by my physician or their designees.     8.   I recognize that in the event my procedure results in extended X-Ray/fluoroscopy time, I may develop a skin reaction.    9. If I have a Do Not Attempt Resuscitation (DNAR) order in place, that status will be suspended while in the operating room, procedural suite, and during the recovery period unless otherwise explicitly stated by me (or a person authorized to consent on my behalf). The surgeon or my attending physician will determine when the applicable recovery period ends for purposes of reinstating the DNAR order.  10. Patients having a sterilization procedure: I understand that if the procedure is successful the results will be permanent and it will therefore be impossible for me to inseminate, conceive, or bear children.  I also understand that the procedure is intended to result in sterility, although the result has not been guaranteed.   11. I acknowledge that my physician has explained sedation/analgesia administration to me including the risk and benefits I consent to the administration of sedation/analgesia as may be necessary or desirable in the judgment of my physician.    I CERTIFY THAT I HAVE READ AND FULLY UNDERSTAND THE ABOVE CONSENT TO OPERATION and/or OTHER PROCEDURE.    _________________________________________  __________________________________  Signature of Patient     Signature of Responsible Person         ___________________________________         Printed Name of Responsible Person           _________________________________                 Relationship to Patient  _________________________________________  ______________________________  Signature of Witness          Date  Time      Patient Name: Marilin Merida     : 1965                 Printed: 2024     Medical Record #: [id number]                     Page 1 [address]  [address]    Consent for Anesthesia    I, Marilin Merida  agree to be cared for by an anesthesiologist, who is specially trained to monitor me and give me medicine to put me to sleep or keep me comfortable during my procedure    I understand that my anesthesiologist is not an employee or agent of [facility] or [employer]. He or she works for [employer].    As the patient asking for anesthesia services, I agree to:  Allow the anesthesiologist (anesthesia doctor) to give me medicine and do additional procedures as necessary. Some examples are: Starting or using an “IV” to give me medicine, fluids or blood during my procedure, and having a breathing tube placed to help me breathe when I’m asleep (intubation). In the event that my heart stops working properly, I understand that my anesthesiologist will make every effort to sustain my life, unless otherwise directed by [facility] Do Not Resuscitate documents.  Tell my anesthesia doctor before my procedure:  If I am pregnant.  The last time that I ate or drank.  All of the medicines I take (including prescriptions, herbal supplements, and pills I can buy without a prescription (including street drugs/illegal medications). Failure to inform my anesthesiologist about these medicines may increase my risk of anesthetic complications.  If I am allergic to anything or have had a reaction to anesthesia before.  I understand how the anesthesia medicine will help me (benefits).  I understand that with any type of anesthesia medicine there are risks:  The most common risks are: nausea, vomiting, sore throat, muscle soreness, damage to my eyes, mouth, or teeth (from breathing tube placement).  Rare risks include: remembering what happened during my procedure, allergic reactions to medications, injury to my airway, heart, lungs, vision, nerves, or muscles and in extremely rare instances death.  My doctor has explained to me other choices available to me for my care (alternatives).  Pregnant Patients  (“epidural”):  I understand that the risks of having an epidural (medicine given into my back to help control pain during labor), include itching, low blood pressure, difficulty urinating, headache or slowing of the baby’s heart. Very rare risks include infection, bleeding, seizure, irregular heart rhythms and nerve injury.  Regional Anesthesia (“spinal”, “epidural”, & “nerve blocks”):  I understand that rare but potential complications include headache, bleeding, infection, seizure, irregular heart rhythms, and nerve injury.    I can change my mind about having anesthesia services at any time before I get the medicine.    _____________________________________________________________________________  Patient (or Representative) Signature/Relationship to Patient  Date   Time    _____________________________________________________________________________   Name (if used)    Language/Organization   Time    _____________________________________________________________________________  Anesthesiologist Signature     Date   Time  I have discussed the procedure and information above with the patient (or patient’s representative) and answered their questions. The patient or their representative has agreed to have anesthesia services.    _____________________________________________________________________________  Witness        Date   Time  I have verified that the signature is that of the patient or patient’s representative, and that it was signed before the procedure  Patient Name: Mrailin Merida     : 1965                 Printed: 2024     Medical Record #: [id number]                     Page 2 of 2

## (undated) NOTE — MR AVS SNAPSHOT
[facility], [place] [address] [facility], [address] [place], [place] [id number]               Thank you for choosing us for your health care visit with Roberto Alvares MD.  We are glad to serve you and happy to provide you with this ? If your prescription is due for a refill, you may be due for a follow up appointment. ? To best provide you care, patients receiving routine medications need to be seen at least once a year.      protocol for controlled substances:  Written prescr You are scheduled for Cervical 4-7 anterior cervical discectomy and fusion on  with .     Pre-op instructions discussed with patient and surgical packet provided:    · You will need to contact the Pre-admission department at [phone] to schedule · If you were on blood thinners (such as Coumadin, Pradaxa, Xarelto, etc) prior to surgery that we had you stop for surgery, please make sure you get instructions about when to resume the medication before you are discharged        from the hospital  · Pos You can access your MyChart to more actively manage your health care and view more details from this visit by going to [email or web address].   If you've recently had a stay at the Hospital you can access your discharge instructions in [address] by eva

## (undated) NOTE — MR AVS SNAPSHOT
[facility], [place] [address] [facility], [address] [place] [phone]               Thank you for choosing us for your health care visit with Anam Lieberman MD.  We are glad to serve you and happy to provide you with this ? Patient must present photo ID at time of . If a designated family member will be picking up prescription, office must be given name of individual in advance and they must present an ID as well. ?  The name of the person picking up your prescripti Commonly known as:  NORVASC           Atorvastatin Calcium 10 MG Tabs   Commonly known as:  LIPITOR           Betamethasone Dipropionate Aug 0.05 % Crea   Commonly known as:  DIPROLENE-AF           Fexofenadine HCl 180 MG Tabs   Take 180 mg by mouth daily. authorization numbers or be assured that none are required. You can then schedule your appointment. Failure to obtain required authorization numbers can create reimbursement difficulties for you.         Evaluate and treat    Functional Status questions com

## (undated) NOTE — LETTER
Patient Name: Courtney Young  YOB: 1965          MRN number:  [id number]  Date:  8/24/2017  Referring Physician:  Irvin Batista          SPINE EVALUATION:    Referring Physician: Dr. Shante Simms  Diagnosis: Cervical Spine Dysfunction      Skyler fwd shoulder, mildly protracted scaps. Little cervical sine guarding with transfers and during subjective.        Cervical AROM:   Flexion: 30*  Extension: 20*  Sidebending: R 20*; L 25*  Rotation: R 30; L 35    Accessory motion: decreased L upper cervical Patient/Family/Caregiver was advised of these findings, precautions, and treatment options and has agreed to actively participate in planning and for this course of care.     Thank you for your referral.  If you have any questions, please contact me at Dept

## (undated) NOTE — Clinical Note
Patient Name: Som Goodman  YOB: 1965          MRN number:  [id number]  Date:  5/24/2017  Referring Physician:  Shannan Morgan             Subjective: Continues to have daily hip, lateral leg symptoms, dorsal foot and shin symptoms.   Symptoms performing house chores in 4 pt kneeling with minimal back pain, little difficulty. -Improve limbo pelvic strength and flexibility  to WFL's so pt can perform bed, chair, car transfers with little pain or difficulty.   -Independent with progressive HEP.

## (undated) NOTE — MR AVS SNAPSHOT
[place] [address] [phone]               Thank you for choosing us for your health care visit with Hira Campbell, RORY. We are glad to serve you and happy to provide you with this summary of your visit.   Please the building. For security purposes, please check in with the reception staff at every visit.                                           Apr 20, 2017  4:15 PM   PT VISIT BY THERAPIST with Brunilda Santana, PT   [facility] in [address] [place] [address]  (BRADLYW discharge instructions in [employer] by going to Visits < Admission Summaries. If you've been to the Emergency Department or your doctor's office, you can view your past visit information in [employer] by going to Visits < Visit Summaries. Gram[employer] questions?

## (undated) NOTE — MR AVS SNAPSHOT
[facility], [address], [facility] [address] [phone]               Thank you for choosing us for your health care visit with CHRISS Malin.   We are glad to serve you and happy to provide you with this prescription must be signed by the provider, picked up in our office and physically brought to the pharmacy. A 30 day supply with no refills is the maximum allowed. ? If your prescription is due for a refill, you may be due for a follow up appointment. procedure is authorized, this does not guarantee payment. \"    Ultimately the patient is responsible for payment. Thank you for your understanding in the matter. PLAN:  1. Medrol Dosepak to bring with her to [facility], [address] [place] [address],[address] for pain  2.   If she gets What changed:  Another medication with the same name was removed. Continue taking this medication, and follow the directions you see here.    Commonly known as:  DYAZIDE                Where to Get Your Medications      These medications were sent to [facility]

## (undated) NOTE — MR AVS SNAPSHOT
[facility], [place] [phone]               Thank you for choosing us for your health care visit with CHRISS Gómez.   We are glad to serve you and happy to provide you with this present documentation of the implant information at time of your appointment.   FOR FEMALES HAVING GADOLINIUM EXAMS :If you are breastfeeding and your exam requires an IV Contrast (Gadolinium) injection, you need to stop breastfeeding for 24-48 hours after testing room. Parents will remain in the MRI waiting area and be reunited with the child upon completion of the MRI.               Today's Orders     MRI SPINE LUMBAR (W+WO) (CPT=72158) [[id number]]    Complete by:  Jun 02, 2017 (Approximate)    Assoc Dx:  Curtis Guzman Cervical radiculopathy at C7          Instructions and Information about Your Health      Refill policies:    ? Allow 2-3 business days for refills; controlled substances may take longer. ?  Contact your pharmacy at least 5 days prior to running out of me Precertification and Prior Authorizations  If your physician has recommended that you have a procedure or additional testing performed.   [facility]) will contact your insurance carrier to obtain pre-certification or prior Josué Nathan Where to Get Your Medications      These medications were sent to [address], [facility] [address] [facility] [place], [phone], [phone]  [address] [facility], [place] [phone]     Phone:  [phone]-

## (undated) NOTE — LETTER
Surgery Scheduling Request Form/Physician Orders  Fax to:  [phone]          []  New Case []  Modified []  Rescheduled To:  / / Pau Hargrove order supersedes any conflicting corresponding orders on the pre-op order set. Surgery Date:Start Time: Laxmi Date:  _      3/20/2019 __  Electronically Signed by:    Time:  ___3:36 PM __    For Internal Use Only   Case #: Initials:   Date Booked: Time Booked

## (undated) NOTE — LETTER
2018        Patient: Som Goodman  : 1965           To Whom it may concern:     This letter has been written at the patient's request. The above patient was seen at the [facility] for treatment of a medical condition on

## (undated) NOTE — MR AVS SNAPSHOT
[place] [address] [phone]               Thank you for choosing us for your health care visit with Hira Campbell, PT. We are glad to serve you and happy to provide you with this summary of your visit.   Please Commonly known as:  DESYREL           Triamterene-HCTZ 37.5-25 MG Caps   Commonly known as:  Joan 7                   MyChart     Visit MyChart  You can access your MyChart to more actively manage your health care and view more details from this visit by 2 ½ hours per week – spread out over time Use a nitish to keep you motivated   Don’t forget strength training with weights and resistance Set goals and track your progress   You don’t need to join a gym. Home exercises work great.  Put more priority on exe

## (undated) NOTE — LETTER
2018      Patient: Barrett Dumont  : [birth year]        To Whom it may concern: This letter has been written at the patient's request. The above patient has been at the [facility] for treatment of a medical condition.     Patient